# Patient Record
Sex: FEMALE | Race: WHITE | Employment: FULL TIME | URBAN - METROPOLITAN AREA
[De-identification: names, ages, dates, MRNs, and addresses within clinical notes are randomized per-mention and may not be internally consistent; named-entity substitution may affect disease eponyms.]

---

## 2017-01-13 ENCOUNTER — OFFICE VISIT (OUTPATIENT)
Dept: FAMILY MEDICINE | Facility: CLINIC | Age: 42
End: 2017-01-13
Payer: COMMERCIAL

## 2017-01-13 VITALS
SYSTOLIC BLOOD PRESSURE: 130 MMHG | HEIGHT: 60 IN | OXYGEN SATURATION: 98 % | TEMPERATURE: 98.7 F | DIASTOLIC BLOOD PRESSURE: 80 MMHG | HEART RATE: 71 BPM | WEIGHT: 173.5 LBS | BODY MASS INDEX: 34.06 KG/M2

## 2017-01-13 DIAGNOSIS — N63.0 BREAST LUMP IN UPPER OUTER QUADRANT: Primary | ICD-10-CM

## 2017-01-13 DIAGNOSIS — Z23 NEED FOR PROPHYLACTIC VACCINATION AND INOCULATION AGAINST INFLUENZA: ICD-10-CM

## 2017-01-13 DIAGNOSIS — Z72.0 TOBACCO ABUSE: ICD-10-CM

## 2017-01-13 PROCEDURE — 90686 IIV4 VACC NO PRSV 0.5 ML IM: CPT | Performed by: FAMILY MEDICINE

## 2017-01-13 PROCEDURE — 90471 IMMUNIZATION ADMIN: CPT | Performed by: FAMILY MEDICINE

## 2017-01-13 PROCEDURE — 99213 OFFICE O/P EST LOW 20 MIN: CPT | Mod: 25 | Performed by: FAMILY MEDICINE

## 2017-01-13 ASSESSMENT — ANXIETY QUESTIONNAIRES
3. WORRYING TOO MUCH ABOUT DIFFERENT THINGS: SEVERAL DAYS
IF YOU CHECKED OFF ANY PROBLEMS ON THIS QUESTIONNAIRE, HOW DIFFICULT HAVE THESE PROBLEMS MADE IT FOR YOU TO DO YOUR WORK, TAKE CARE OF THINGS AT HOME, OR GET ALONG WITH OTHER PEOPLE: NOT DIFFICULT AT ALL
1. FEELING NERVOUS, ANXIOUS, OR ON EDGE: MORE THAN HALF THE DAYS
2. NOT BEING ABLE TO STOP OR CONTROL WORRYING: SEVERAL DAYS
7. FEELING AFRAID AS IF SOMETHING AWFUL MIGHT HAPPEN: SEVERAL DAYS
GAD7 TOTAL SCORE: 10
5. BEING SO RESTLESS THAT IT IS HARD TO SIT STILL: NEARLY EVERY DAY
6. BECOMING EASILY ANNOYED OR IRRITABLE: SEVERAL DAYS

## 2017-01-13 ASSESSMENT — PATIENT HEALTH QUESTIONNAIRE - PHQ9: 5. POOR APPETITE OR OVEREATING: SEVERAL DAYS

## 2017-01-13 NOTE — PROGRESS NOTES
"  SUBJECTIVE:                                                    Sarahy Meng is a 41 year old female who presents to clinic today for the following health issues:    Notes a right breast lump 5 days ago, hasn't changed in size since.   No pain in this area, no skin changes. No nipple discharge.     No previous history of breast lumps.    Has not had a mammogram at this point.     FH of fibrocystic breasts.     Smokes. Says she's cut back to e-cig and fewer cigs daily.       Problem list and histories reviewed & adjusted, as indicated.  Additional history: none    Problem list, Medication list, Allergies, and Medical/Social/Surgical histories reviewed in [facility] and updated as appropriate.    ROS:  Constitutional, HEENT, cardiovascular, pulmonary, gi and gu systems are negative, except as otherwise noted.    OBJECTIVE:                                                    /80 mmHg  Pulse 71  Temp(Src) 98.7  F (37.1  C) (Oral)  Ht 4' 11.75\" (1.518 m)  Wt 173 lb 8 oz (78.699 kg)  BMI 34.15 kg/m2  SpO2 98%  Breastfeeding? No  Body mass index is 34.15 kg/(m^2).  GENERAL: healthy, alert and no distress  BREAST: 1 cm freely moveable lump in right breast at 9 o'clock, left breast normal, no tenderness or nipple discharge and no palpable axillary masses or adenopathy    Diagnostic Test Results:  none      ASSESSMENT/PLAN:                                                      1. Breast lump in upper outer quadrant - Discussed that this was likely benign but will get imaging to confirm.   - MA Diagnostic Digital Bilateral; Future  - US Breast Right Complete 4 Quadrants; Future    2. Tobacco abuse - Discussed tobacco use is a risk factor for breast cancer. She is contemplative and feels she is on the right road with her e-cig.       Stacy Mansfield MD  [facility]    "

## 2017-01-13 NOTE — PATIENT INSTRUCTIONS
Breast Lump, Uncertain Cause    A lump was found in your breast. Most breast lumps are not cancer. They may be caused by normal changes in the breast tissue due to hormone variations that occur with your menstrual cycle. Some women may form lumps that are painful and tender. Others may form lumps that are painless.  At this time, is not possible to be certain of the cause of your lump without further evaluation. This could include:    Another exam by your healthcare provider or a gynecologist    Imaging tests, such as a mammogram or ultrasound    Biopsy (procedure to remove small tissue samples from the breast lump)  Your healthcare provider will explain any additional testing that is needed. Be sure to get answers to any questions you may have.  Home care  Until a diagnosis is made, you may be advised to do the following:    If you are having breast pain:    Take an over-the-counter pain reliever, if directed to by your provider.    Wear a well-fitted bra or sports bra for extra support. If you have breast pain at night, try wearing the bra during sleep.    Apply a warm compress (towel soaked in warm water) to the breast. You may also use a hot water bottle.    Check your breasts each day. Keep a log of whether the lump seems to be changing in size or tenderness with your period. This can help your healthcare provider make the correct diagnosis.  Follow-up care  Follow up with your healthcare provider as directed. Keep all appointments. Also, prepare for any upcoming tests as directed.  When to seek medical advice  Call your healthcare provider right away if any of these occur:    Fever of 100.4 F (38 C) or higher    Redness or swelling of the breast    Discharge from the nipple    Visible changes in the skin over the nipple or breast    Lump grows larger, feels very hard, or has an irregular shape    New lumps form    4026-6739 [employer]. [address] [place] [address], [place] [address]. All rights  reserved. This information is not intended as a substitute for professional medical care. Always follow your healthcare professional's instructions.

## 2017-01-13 NOTE — PROGRESS NOTES
Injectable Influenza Immunization Documentation    1.  Is the person to be vaccinated sick today?  No    2. Does the person to be vaccinated have an allergy to eggs or to a component of the vaccine?  No    3. Has the person to be vaccinated today ever had a serious reaction to influenza vaccine in the past?  No    4. Has the person to be vaccinated ever had Guillain-Hinsdale syndrome?  No     Form completed by Anjana

## 2017-01-13 NOTE — MR AVS SNAPSHOT
After Visit Summary   1/13/2017    Sarahy Meng    MRN: [id number]           Patient Information     Date Of Birth          1975        Visit Information        Provider Department      1/13/2017 8:15 AM Stacy Mansfield MD [facility]        Today's Diagnoses     Breast lump in upper outer quadrant    -  1     Tobacco abuse           Care Instructions      Breast Lump, Uncertain Cause    A lump was found in your breast. Most breast lumps are not cancer. They may be caused by normal changes in the breast tissue due to hormone variations that occur with your menstrual cycle. Some women may form lumps that are painful and tender. Others may form lumps that are painless.  At this time, is not possible to be certain of the cause of your lump without further evaluation. This could include:    Another exam by your healthcare provider or a gynecologist    Imaging tests, such as a mammogram or ultrasound    Biopsy (procedure to remove small tissue samples from the breast lump)  Your healthcare provider will explain any additional testing that is needed. Be sure to get answers to any questions you may have.  Home care  Until a diagnosis is made, you may be advised to do the following:    If you are having breast pain:    Take an over-the-counter pain reliever, if directed to by your provider.    Wear a well-fitted bra or sports bra for extra support. If you have breast pain at night, try wearing the bra during sleep.    Apply a warm compress (towel soaked in warm water) to the breast. You may also use a hot water bottle.    Check your breasts each day. Keep a log of whether the lump seems to be changing in size or tenderness with your period. This can help your healthcare provider make the correct diagnosis.  Follow-up care  Follow up with your healthcare provider as directed. Keep all appointments. Also, prepare for any upcoming tests as directed.  When to seek medical advice  Call  "your healthcare provider right away if any of these occur:    Fever of 100.4 F (38 C) or higher    Redness or swelling of the breast    Discharge from the nipple    Visible changes in the skin over the nipple or breast    Lump grows larger, feels very hard, or has an irregular shape    New lumps form    3114-6178 [employer]. [address] [place] [address]. All rights reserved. This information is not intended as a substitute for professional medical care. Always follow your healthcare professional's instructions.              Follow-ups after your visit        Future tests that were ordered for you today     Open Future Orders        Priority Expected Expires Ordered    MA Diagnostic Digital Bilateral Routine  1/13/2018 1/13/2017    US Breast Right Complete 4 Quadrants Routine  1/13/2018 1/13/2017            Who to contact     If you have questions or need follow up information about today's clinic visit or your schedule please contact [facility] directly at [phone].  Normal or non-critical lab and imaging results will be communicated to you by [employer], letter or phone within 4 business days after the clinic has received the results. If you do not hear from us within 7 days, please contact the clinic through [employer] or phone. If you have a critical or abnormal lab result, we will notify you by phone as soon as possible.  Submit refill requests through [employer] or call your pharmacy and they will forward the refill request to us. Please allow 3 business days for your refill to be completed.          Additional Information About Your Visit        [email or web address] Information     [employer] lets you send messages to your doctor, view your test results, renew your prescriptions, schedule appointments and more. To sign up, go to [email or web address] . Click on \"Log in\" on the left side of the screen, which will take you to the Welcome page. Then click on \"Sign up Now\" on the right " "side of the page.     You will be asked to enter the access code listed below, as well as some personal information. Please follow the directions to create your username and password.     Your access code is: [email or web address]  Expires: 2017  8:40 AM     Your access code will  in 90 days. If you need help or a new code, please call your [facility] or [phone].        Care EveryWhere ID     This is your Care EveryWhere ID. This could be used by other organizations to access your [place] medical records  [id number]        Your Vitals Were     Pulse Temperature Height BMI (Body Mass Index) Pulse Oximetry Breastfeeding?    71 98.7  F (37.1  C) (Oral) 4' 11.75\" (1.518 m) 34.15 kg/m2 98% No       Blood Pressure from Last 3 Encounters:   17 130/80   16 138/82   16 139/79    Weight from Last 3 Encounters:   17 173 lb 8 oz (78.699 kg)   16 163 lb 14.4 oz (74.345 kg)   16 163 lb (73.936 kg)               Primary Care Provider Office Phone # Fax #    Ramona Ann Aaseby-Aguilera, PA-C [phone]       [facility] [address] [address] [address]        Thank you!     Thank you for choosing [facility]  for your care. Our goal is always to provide you with excellent care. Hearing back from our patients is one way we can continue to improve our services. Please take a few minutes to complete the written survey that you may receive in the mail after your visit with us. Thank you!             Your Updated Medication List - Protect others around you: Learn how to safely use, store and throw away your medicines at www.disposemymeds.org.          This list is accurate as of: 17  8:40 AM.  Always use your most recent med list.                   Brand Name Dispense Instructions for use    venlafaxine 75 MG tablet    EFFEXOR    60 tablet    Take 1 tablet (75 mg) by mouth 2 times daily         "

## 2017-01-13 NOTE — NURSING NOTE
"Chief Complaint   Patient presents with     Breast Mass     right        Initial /80 mmHg  Pulse 71  Temp(Src) 98.7  F (37.1  C) (Oral)  Ht 4' 11.75\" (1.518 m)  Wt 173 lb 8 oz (78.699 kg)  BMI 34.15 kg/m2  SpO2 98%  Breastfeeding? No Estimated body mass index is 34.15 kg/(m^2) as calculated from the following:    Height as of this encounter: 4' 11.75\" (1.518 m).    Weight as of this encounter: 173 lb 8 oz (78.699 kg).  BP completed using cuff size: regular right arm   TOMMY Olsen      "

## 2017-01-14 ASSESSMENT — ANXIETY QUESTIONNAIRES: GAD7 TOTAL SCORE: 10

## 2017-01-14 ASSESSMENT — PATIENT HEALTH QUESTIONNAIRE - PHQ9: SUM OF ALL RESPONSES TO PHQ QUESTIONS 1-9: 6

## 2017-01-16 ENCOUNTER — HOSPITAL ENCOUNTER (OUTPATIENT)
Dept: MAMMOGRAPHY | Facility: CLINIC | Age: 42
Discharge: HOME | End: 2017-01-16
Attending: FAMILY MEDICINE | Admitting: FAMILY MEDICINE
Payer: COMMERCIAL

## 2017-01-16 ENCOUNTER — HOSPITAL ENCOUNTER (OUTPATIENT)
Dept: ULTRASOUND IMAGING | Facility: CLINIC | Age: 42
End: 2017-01-16
Attending: FAMILY MEDICINE
Payer: COMMERCIAL

## 2017-01-16 DIAGNOSIS — N63.0 BREAST LUMP IN UPPER OUTER QUADRANT: ICD-10-CM

## 2017-01-16 PROCEDURE — G0204 DX MAMMO INCL CAD BI: HCPCS

## 2017-01-16 PROCEDURE — 76642 ULTRASOUND BREAST LIMITED: CPT | Mod: RT

## 2017-01-25 ENCOUNTER — OFFICE VISIT (OUTPATIENT)
Dept: FAMILY MEDICINE | Facility: CLINIC | Age: 42
End: 2017-01-25
Payer: COMMERCIAL

## 2017-01-25 VITALS
BODY MASS INDEX: 33.96 KG/M2 | SYSTOLIC BLOOD PRESSURE: 136 MMHG | TEMPERATURE: 99.1 F | HEIGHT: 60 IN | DIASTOLIC BLOOD PRESSURE: 88 MMHG | WEIGHT: 173 LBS | HEART RATE: 100 BPM

## 2017-01-25 DIAGNOSIS — R82.90 NONSPECIFIC FINDING ON EXAMINATION OF URINE: ICD-10-CM

## 2017-01-25 DIAGNOSIS — R30.0 DYSURIA: ICD-10-CM

## 2017-01-25 DIAGNOSIS — N30.00 ACUTE CYSTITIS WITHOUT HEMATURIA: Primary | ICD-10-CM

## 2017-01-25 LAB
ALBUMIN UR-MCNC: 30 MG/DL
APPEARANCE UR: CLEAR
BACTERIA #/AREA URNS HPF: ABNORMAL /HPF
BILIRUB UR QL STRIP: NEGATIVE
COLOR UR AUTO: YELLOW
GLUCOSE UR STRIP-MCNC: NEGATIVE MG/DL
HGB UR QL STRIP: ABNORMAL
KETONES UR STRIP-MCNC: NEGATIVE MG/DL
LEUKOCYTE ESTERASE UR QL STRIP: ABNORMAL
NITRATE UR QL: NEGATIVE
PH UR STRIP: 7 PH (ref 5–7)
RBC #/AREA URNS AUTO: ABNORMAL /HPF (ref 0–2)
SP GR UR STRIP: 1.02 (ref 1–1.03)
URN SPEC COLLECT METH UR: ABNORMAL
UROBILINOGEN UR STRIP-ACNC: 2 EU/DL (ref 0.2–1)
WBC #/AREA URNS AUTO: ABNORMAL /HPF (ref 0–2)

## 2017-01-25 PROCEDURE — 87088 URINE BACTERIA CULTURE: CPT | Performed by: FAMILY MEDICINE

## 2017-01-25 PROCEDURE — 81001 URINALYSIS AUTO W/SCOPE: CPT | Performed by: FAMILY MEDICINE

## 2017-01-25 PROCEDURE — 87086 URINE CULTURE/COLONY COUNT: CPT | Performed by: FAMILY MEDICINE

## 2017-01-25 PROCEDURE — 87186 SC STD MICRODIL/AGAR DIL: CPT | Performed by: FAMILY MEDICINE

## 2017-01-25 PROCEDURE — 99213 OFFICE O/P EST LOW 20 MIN: CPT | Performed by: FAMILY MEDICINE

## 2017-01-25 RX ORDER — NITROFURANTOIN 25; 75 MG/1; MG/1
100 CAPSULE ORAL 2 TIMES DAILY
Qty: 14 CAPSULE | Refills: 0 | Status: SHIPPED | OUTPATIENT
Start: 2017-01-25 | End: 2017-04-10

## 2017-01-25 NOTE — PATIENT INSTRUCTIONS
" Try AZO for symptom relief  * BLADDER INFECTION,Female (Adult)    A bladder infection (\"cystitis\" or \"UTI\") usually causes a constant urge to urinate and a burning when passing urine. Urine may be cloudy, smelly or dark. There may be pain in the lower abdomen. A bladder infection occurs when bacteria from the vaginal area enter the bladder opening (urethra). This can occur from sexual intercourse, wearing tight clothing, dehydration and other factors.  HOME CARE:  1. Drink lots of fluids (at least 6-8 glasses a day, unless you must restrict fluids for other medical reasons). This will force the medicine into your urinary system and flush the bacteria out of your body. Cranberry juice has been shown to help clear out the bacteria.  2. Avoid sexual intercourse until your symptoms are gone.  3. A bladder infection is treated with antibiotics. You may also be given Pyridium (generic = phenazopyridine) to reduce the burning sensation. This medicine will cause your urine to become a bright orange color. The orange urine may stain clothing. You may wear a pad or panty-liner to protect clothing.  PREVENTING FUTURE INFECTIONS:  1. Always wipe from front to back after a bowel movement.  2. Keep the genital area clean and dry.  3. Drink plenty of fluids each day to avoid dehydration.  4. Urinate right after intercourse to flush out the bladder.  5. Wear cotton underwear and cotton-lined panty hose; avoid tight-fitting pants.  6. If you are on birth control pills and are having frequent bladder infections, discuss with your doctor.  FOLLOW UP: Return to this facility or see your doctor if ALL symptoms are not gone after three days of treatment.  GET PROMPT MEDICAL ATTENTION if any of the following occur:    Fever over 101 F (38.3 C)    No improvement by the third day of treatment    Increasing back or abdominal pain    Repeated vomiting; unable to keep medicine down    Weakness, dizziness or fainting    Vaginal " discharge    Pain, redness or swelling in the labia (outer vaginal area)    5077-1475 [employer], [address] [place] [address], [place] [address]. All rights reserved. This information is not intended as a substitute for professional medical care. Always follow your healthcare professional's instructions.

## 2017-01-25 NOTE — MR AVS SNAPSHOT
"              After Visit Summary   1/25/2017    Sarahy Meng    MRN: [id number]           Patient Information     Date Of Birth          1975        Visit Information        Provider Department      1/25/2017 1:30 PM Stacy Mansfield MD [facility]        Today's Diagnoses     Acute cystitis without hematuria    -  1     Dysuria         Nonspecific finding on examination of urine           Care Instructions     Try AZO for symptom relief  * BLADDER INFECTION,Female (Adult)    A bladder infection (\"cystitis\" or \"UTI\") usually causes a constant urge to urinate and a burning when passing urine. Urine may be cloudy, smelly or dark. There may be pain in the lower abdomen. A bladder infection occurs when bacteria from the vaginal area enter the bladder opening (urethra). This can occur from sexual intercourse, wearing tight clothing, dehydration and other factors.  HOME CARE:  1. Drink lots of fluids (at least 6-8 glasses a day, unless you must restrict fluids for other medical reasons). This will force the medicine into your urinary system and flush the bacteria out of your body. Cranberry juice has been shown to help clear out the bacteria.  2. Avoid sexual intercourse until your symptoms are gone.  3. A bladder infection is treated with antibiotics. You may also be given Pyridium (generic = phenazopyridine) to reduce the burning sensation. This medicine will cause your urine to become a bright orange color. The orange urine may stain clothing. You may wear a pad or panty-liner to protect clothing.  PREVENTING FUTURE INFECTIONS:  1. Always wipe from front to back after a bowel movement.  2. Keep the genital area clean and dry.  3. Drink plenty of fluids each day to avoid dehydration.  4. Urinate right after intercourse to flush out the bladder.  5. Wear cotton underwear and cotton-lined panty hose; avoid tight-fitting pants.  6. If you are on birth control pills and are having frequent " "bladder infections, discuss with your doctor.  FOLLOW UP: Return to this facility or see your doctor if ALL symptoms are not gone after three days of treatment.  GET PROMPT MEDICAL ATTENTION if any of the following occur:    Fever over 101 F (38.3 C)    No improvement by the third day of treatment    Increasing back or abdominal pain    Repeated vomiting; unable to keep medicine down    Weakness, dizziness or fainting    Vaginal discharge    Pain, redness or swelling in the labia (outer vaginal area)    7799-5972 [employer], [address] [place] [address], [place] [address]. All rights reserved. This information is not intended as a substitute for professional medical care. Always follow your healthcare professional's instructions.          Follow-ups after your visit        Who to contact     If you have questions or need follow up information about today's clinic visit or your schedule please contact [facility] directly at [phone].  Normal or non-critical lab and imaging results will be communicated to you by MyChart, letter or phone within 4 business days after the clinic has received the results. If you do not hear from us within 7 days, please contact the clinic through MyChart or phone. If you have a critical or abnormal lab result, we will notify you by phone as soon as possible.  Submit refill requests through [employer] or call your pharmacy and they will forward the refill request to us. Please allow 3 business days for your refill to be completed.          Additional Information About Your Visit        [employer] Information     [employer] lets you send messages to your doctor, view your test results, renew your prescriptions, schedule appointments and more. To sign up, go to [email or web address] . Click on \"Log in\" on the left side of the screen, which will take you to the Welcome page. Then click on \"Sign up Now\" on the right side of the page.     You will be asked to enter the access " "code listed below, as well as some personal information. Please follow the directions to create your username and password.     Your access code is: [email or web address]  Expires: 2017  8:40 AM     Your access code will  in 90 days. If you need help or a new code, please call your [facility] or [phone].        Care EveryWhere ID     This is your Care EveryWhere ID. This could be used by other organizations to access your [place] medical records  [id number]        Your Vitals Were     Pulse Temperature Height BMI (Body Mass Index) Breastfeeding?       100 99.1  F (37.3  C) (Oral) 4' 11.75\" (1.518 m) 34.05 kg/m2 No        Blood Pressure from Last 3 Encounters:   17 136/88   17 130/80   16 138/82    Weight from Last 3 Encounters:   17 173 lb (78.472 kg)   17 173 lb 8 oz (78.699 kg)   16 163 lb 14.4 oz (74.345 kg)              We Performed the Following     UA reflex to Microscopic and Culture     Urine Culture Aerobic Bacterial     Urine Microscopic          Today's Medication Changes          These changes are accurate as of: 17  1:56 PM.  If you have any questions, ask your nurse or doctor.               Start taking these medicines.        Dose/Directions    nitrofurantoin (macrocrystal-monohydrate) 100 MG capsule   Commonly known as:  MACROBID   Used for:  Acute cystitis without hematuria   Started by:  Stacy Mansfield MD        Dose:  100 mg   Take 1 capsule (100 mg) by mouth 2 times daily   Quantity:  14 capsule   Refills:  0            Where to get your medicines      These medications were sent to [id number] - [place], [place] - [address]   [address], [facility] [address]     Phone:  [phone]    - nitrofurantoin (macrocrystal-monohydrate) 100 MG capsule             Primary Care Provider Office Phone # Fax #    Ramona Ann Aaseby-Aguilera, PA-C [phone]       [facility] [address]  [place] " [place] [address]        Thank you!     Thank you for choosing [facility]  for your care. Our goal is always to provide you with excellent care. Hearing back from our patients is one way we can continue to improve our services. Please take a few minutes to complete the written survey that you may receive in the mail after your visit with us. Thank you!             Your Updated Medication List - Protect others around you: Learn how to safely use, store and throw away your medicines at www.disposemymeds.org.          This list is accurate as of: 1/25/17  1:56 PM.  Always use your most recent med list.                   Brand Name Dispense Instructions for use    nitrofurantoin (macrocrystal-monohydrate) 100 MG capsule    MACROBID    14 capsule    Take 1 capsule (100 mg) by mouth 2 times daily       venlafaxine 75 MG tablet    EFFEXOR    60 tablet    Take 1 tablet (75 mg) by mouth 2 times daily

## 2017-01-25 NOTE — PROGRESS NOTES
"  SUBJECTIVE:                                                    Sarahy Meng is a 41 year old female who presents to clinic today for the following health issues:    URINARY TRACT SYMPTOMS      Duration: couple weeks. Got worse yesterday    Description  Fatigue, shaky, bloating, burning    Intensity:  moderate, severe    Accompanying signs and symptoms:  Fever/chills: no   Flank pain YES  Nausea and vomiting: YES nausea  Vaginal symptoms: odor  Abdominal/Pelvic Pain: YES    History  History of frequent UTI's: YES  History of kidney stones: no   Sexually Active: YES  Possibility of pregnancy: No    Precipitating or alleviating factors: None    Therapies tried and outcome: increase fluid intake   Outcome: not helping much         Problem list and histories reviewed & adjusted, as indicated.  Additional history: none    Problem list, Medication list, Allergies, and Medical/Social/Surgical histories reviewed in EPIC and updated as appropriate.    ROS:  Constitutional, HEENT, cardiovascular, pulmonary, gi and gu systems are negative, except as otherwise noted.    OBJECTIVE:                                                    /88 mmHg  Pulse 100  Temp(Src) 99.1  F (37.3  C) (Oral)  Ht 4' 11.75\" (1.518 m)  Wt 173 lb (78.472 kg)  BMI 34.05 kg/m2  Breastfeeding? No  Body mass index is 34.05 kg/(m^2).  GENERAL: healthy, alert and no distress  ABDOMEN: soft, nontender, no hepatosplenomegaly, no masses and bowel sounds normal    Diagnostic Test Results:  Results for orders placed or performed in visit on 01/25/17 (from the past 24 hour(s))   UA reflex to Microscopic and Culture   Result Value Ref Range    Color Urine Yellow     Appearance Urine Clear     Glucose Urine Negative NEG mg/dL    Bilirubin Urine Negative NEG    Ketones Urine Negative NEG mg/dL    Specific Gravity Urine 1.020 1.003 - 1.035    Blood Urine Trace (A) NEG    pH Urine 7.0 5.0 - 7.0 pH    Protein Albumin Urine 30 (A) NEG mg/dL    Urobilinogen " Urine 2.0 (H) 0.2 - 1.0 EU/dL    Nitrite Urine Negative NEG    Leukocyte Esterase Urine Trace (A) NEG    Source Midstream Urine    Urine Microscopic   Result Value Ref Range    WBC Urine 10-25  CULTURED.   (A) 0 - 2 /HPF    RBC Urine O - 2 0 - 2 /HPF    Bacteria Urine Many (A) NEG /HPF        ASSESSMENT/PLAN:                                                      1. Acute cystitis without hematuria - Discussed treatment, use AZO for symptom relief.   - nitrofurantoin, macrocrystal-monohydrate, (MACROBID) 100 MG capsule; Take 1 capsule (100 mg) by mouth 2 times daily  Dispense: 14 capsule; Refill: 0    2. Dysuria  - UA reflex to Microscopic and Culture  - Urine Microscopic  - Urine Culture Aerobic Bacterial    3. Nonspecific finding on examination of urine  - Urine Culture Aerobic Bacterial    Stacy Mansfield MD  [facility]

## 2017-01-25 NOTE — NURSING NOTE
"Chief Complaint   Patient presents with     Urinary Problem       Initial /88 mmHg  Pulse 100  Temp(Src) 99.1  F (37.3  C) (Oral)  Ht 4' 11.75\" (1.518 m)  Wt 173 lb (78.472 kg)  BMI 34.05 kg/m2  Breastfeeding? No Estimated body mass index is 34.05 kg/(m^2) as calculated from the following:    Height as of this encounter: 4' 11.75\" (1.518 m).    Weight as of this encounter: 173 lb (78.472 kg).  BP completed using cuff size: brandin Aldana CMA          "

## 2017-01-27 LAB
BACTERIA SPEC CULT: ABNORMAL
MICRO REPORT STATUS: ABNORMAL
MICROORGANISM SPEC CULT: ABNORMAL
SPECIMEN SOURCE: ABNORMAL

## 2017-04-03 DIAGNOSIS — F41.9 ANXIETY: ICD-10-CM

## 2017-04-03 DIAGNOSIS — F33.1 MAJOR DEPRESSIVE DISORDER, RECURRENT EPISODE, MODERATE (H): ICD-10-CM

## 2017-04-03 NOTE — TELEPHONE ENCOUNTER
Pending Prescriptions:                       Disp   Refills    venlafaxine (EFFEXOR) 75 MG tablet [Pharm*60 tab*2            Sig: TAKE ONE TABLET BY MOUTH TWICE A DAY    THIS MEDICATION WAS DISCONTINUED ON 04/03/2017        Last Written Prescription Date: 11/20/2016  Last Fill Quantity: 60, # refills: 3  Last Office Visit with G, P or [facility] prescribing provider: 01/25/2017        BP Readings from Last 3 Encounters:   01/25/17 136/88   01/13/17 130/80   05/05/16 138/82     Pulse: (for Fetzima)  Creatinine   Date Value Ref Range Status   02/06/2015 0.56 0.52 - 1.04 mg/dL Final   ]    Last PHQ-9 score on record=   PHQ-9 SCORE 1/13/2017   Total Score -   Total Score 6     Samuel RAMOS

## 2017-04-05 RX ORDER — VENLAFAXINE 75 MG/1
TABLET ORAL
Qty: 60 TABLET | Refills: 2 | OUTPATIENT
Start: 2017-04-05

## 2017-04-05 NOTE — TELEPHONE ENCOUNTER
Pt is taking medication-  She is due for yearly PX/Med check with PCP-    She states she does have medication left as she does not always remember to take it BID.     Tamy Borrego RN

## 2017-04-10 ENCOUNTER — OFFICE VISIT (OUTPATIENT)
Dept: FAMILY MEDICINE | Facility: CLINIC | Age: 42
End: 2017-04-10
Payer: COMMERCIAL

## 2017-04-10 VITALS
BODY MASS INDEX: 34.36 KG/M2 | DIASTOLIC BLOOD PRESSURE: 80 MMHG | HEART RATE: 66 BPM | OXYGEN SATURATION: 98 % | TEMPERATURE: 98.7 F | RESPIRATION RATE: 16 BRPM | HEIGHT: 60 IN | WEIGHT: 175 LBS | SYSTOLIC BLOOD PRESSURE: 120 MMHG

## 2017-04-10 DIAGNOSIS — Z13.220 LIPID SCREENING: ICD-10-CM

## 2017-04-10 DIAGNOSIS — F41.9 ANXIETY: Primary | ICD-10-CM

## 2017-04-10 DIAGNOSIS — Z13.0 SCREENING FOR DISORDER OF BLOOD AND BLOOD-FORMING ORGANS: ICD-10-CM

## 2017-04-10 DIAGNOSIS — Z13.1 SCREENING FOR DIABETES MELLITUS: ICD-10-CM

## 2017-04-10 DIAGNOSIS — Z13.29 SCREENING FOR THYROID DISORDER: ICD-10-CM

## 2017-04-10 LAB
ALBUMIN SERPL-MCNC: 3.9 G/DL (ref 3.4–5)
ALP SERPL-CCNC: 65 U/L (ref 40–150)
ALT SERPL W P-5'-P-CCNC: 55 U/L (ref 0–50)
ANION GAP SERPL CALCULATED.3IONS-SCNC: 8 MMOL/L (ref 3–14)
AST SERPL W P-5'-P-CCNC: 20 U/L (ref 0–45)
BILIRUB SERPL-MCNC: 0.4 MG/DL (ref 0.2–1.3)
BUN SERPL-MCNC: 10 MG/DL (ref 7–30)
CALCIUM SERPL-MCNC: 8.7 MG/DL (ref 8.5–10.1)
CHLORIDE SERPL-SCNC: 105 MMOL/L (ref 94–109)
CHOLEST SERPL-MCNC: 242 MG/DL
CO2 SERPL-SCNC: 26 MMOL/L (ref 20–32)
CREAT SERPL-MCNC: 0.46 MG/DL (ref 0.52–1.04)
ERYTHROCYTE [DISTWIDTH] IN BLOOD BY AUTOMATED COUNT: 11.9 % (ref 10–15)
GFR SERPL CREATININE-BSD FRML MDRD: ABNORMAL ML/MIN/1.7M2
GLUCOSE SERPL-MCNC: 89 MG/DL (ref 70–99)
HCT VFR BLD AUTO: 40.2 % (ref 35–47)
HDLC SERPL-MCNC: 44 MG/DL
HGB BLD-MCNC: 13.7 G/DL (ref 11.7–15.7)
LDLC SERPL CALC-MCNC: 165 MG/DL
MCH RBC QN AUTO: 32.9 PG (ref 26.5–33)
MCHC RBC AUTO-ENTMCNC: 34.1 G/DL (ref 31.5–36.5)
MCV RBC AUTO: 96 FL (ref 78–100)
NONHDLC SERPL-MCNC: 198 MG/DL
PLATELET # BLD AUTO: 324 10E9/L (ref 150–450)
POTASSIUM SERPL-SCNC: 4.1 MMOL/L (ref 3.4–5.3)
PROT SERPL-MCNC: 7.6 G/DL (ref 6.8–8.8)
RBC # BLD AUTO: 4.17 10E12/L (ref 3.8–5.2)
SODIUM SERPL-SCNC: 139 MMOL/L (ref 133–144)
TRIGL SERPL-MCNC: 167 MG/DL
TSH SERPL DL<=0.005 MIU/L-ACNC: 1.48 MU/L (ref 0.4–4)
WBC # BLD AUTO: 7.2 10E9/L (ref 4–11)

## 2017-04-10 PROCEDURE — 99396 PREV VISIT EST AGE 40-64: CPT | Performed by: PHYSICIAN ASSISTANT

## 2017-04-10 PROCEDURE — 84443 ASSAY THYROID STIM HORMONE: CPT | Performed by: PHYSICIAN ASSISTANT

## 2017-04-10 PROCEDURE — 80061 LIPID PANEL: CPT | Performed by: PHYSICIAN ASSISTANT

## 2017-04-10 PROCEDURE — 36415 COLL VENOUS BLD VENIPUNCTURE: CPT | Performed by: PHYSICIAN ASSISTANT

## 2017-04-10 PROCEDURE — 85027 COMPLETE CBC AUTOMATED: CPT | Performed by: PHYSICIAN ASSISTANT

## 2017-04-10 PROCEDURE — 80053 COMPREHEN METABOLIC PANEL: CPT | Performed by: PHYSICIAN ASSISTANT

## 2017-04-10 RX ORDER — VENLAFAXINE 75 MG/1
TABLET ORAL 2 TIMES DAILY
Refills: 3 | COMMUNITY
Start: 2017-03-08 | End: 2018-02-12

## 2017-04-10 RX ORDER — VENLAFAXINE HYDROCHLORIDE 150 MG/1
150 CAPSULE, EXTENDED RELEASE ORAL DAILY
Qty: 90 CAPSULE | Refills: 1 | Status: SHIPPED | OUTPATIENT
Start: 2017-04-10 | End: 2018-02-12

## 2017-04-10 NOTE — PROGRESS NOTES
SUBJECTIVE:     CC: Sarahy Meng is an 41 year old woman who presents for preventive health visit.     Healthy Habits:    Do you get at least three servings of calcium containing foods daily (dairy, green leafy vegetables, etc.)? yes    Amount of exercise or daily activities, outside of work: none, will be starting yoga     Problems taking medications regularly No    Medication side effects: No    Have you had an eye exam in the past two years? no    Do you see a dentist twice per year? yes    Do you have sleep apnea, excessive snoring or daytime drowsiness?no, but has low energy, always tired        PROBLEMS TO ADD ON...would like have thyroid testing has hx of thyroid disease in family, discuss anxiety medication.    Today's PHQ-2 Score:   PHQ-2 ( 1999 Pfizer) 4/24/2015 2/6/2015   Q1: Little interest or pleasure in doing things 0 1   Q2: Feeling down, depressed or hopeless 0 1   PHQ-2 Score 0 2       Abuse: Current or Past(Physical, Sexual or Emotional)- No  Do you feel safe in your environment - Yes    Social History   Substance Use Topics     Smoking status: Current Every Day Smoker     Years: 15.00     Types: Cigarettes     Smokeless tobacco: Never Used      Comment: 5 ciggs a day     Alcohol use Yes      Comment: socially     The patient does not drink >3 drinks per day nor >7 drinks per week.    Recent Labs   Lab Test  02/06/15   1002  11/13/10   0905   CHOL  212*  208*   HDL  39*  36*   LDL  147*  143*   TRIG  131  142   CHOLHDLRATIO  5.4*  5.7*       Reviewed orders with patient.  Reviewed health maintenance and updated orders accordingly - Yes    Mammo Decision Support:  Mammogram not appropriate for this patient based on age.    Pertinent mammograms are reviewed under the imaging tab.  History of abnormal Pap smear: NO - age 30- 65 PAP every 3 years recommended    Reviewed and updated as needed this visit by clinical staff  Tobacco  Allergies  Med Hx  Surg Hx  Fam Hx  Soc Hx        Reviewed and  "updated as needed this visit by Provider            ROS:  C: NEGATIVE for fever, chills, change in weight  I: NEGATIVE for worrisome rashes, moles or lesions  E: NEGATIVE for vision changes or irritation  ENT: NEGATIVE for ear, mouth and throat problems  R: NEGATIVE for significant cough or SOB  B: NEGATIVE for masses, tenderness or discharge  CV: NEGATIVE for chest pain, palpitations or peripheral edema  GI: NEGATIVE for nausea, abdominal pain, heartburn, or change in bowel habits  : NEGATIVE for unusual urinary or vaginal symptoms. Periods are regular.  M: NEGATIVE for significant arthralgias or myalgia  N: NEGATIVE for weakness, dizziness or paresthesias  P: NEGATIVE for changes in mood or affect    BP Readings from Last 3 Encounters:   04/10/17 120/80   01/25/17 136/88   01/13/17 130/80    Wt Readings from Last 3 Encounters:   04/10/17 175 lb (79.4 kg)   01/25/17 173 lb (78.5 kg)   01/13/17 173 lb 8 oz (78.7 kg)                  OBJECTIVE:     /80 (BP Location: Right arm, Patient Position: Chair, Cuff Size: Adult Large)  Pulse 66  Temp 98.7  F (37.1  C) (Oral)  Resp 16  Ht 4' 11.5\" (1.511 m)  Wt 175 lb (79.4 kg)  SpO2 98%  BMI 34.75 kg/m2  EXAM:  GENERAL: healthy, alert and no distress  EYES: Eyes grossly normal to inspection, PERRL and conjunctivae and sclerae normal  HENT: ear canals and TM's normal, nose and mouth without ulcers or lesions  NECK: no adenopathy, no asymmetry, masses, or scars and thyroid normal to palpation  RESP: lungs clear to auscultation - no rales, rhonchi or wheezes  BREAST: normal without masses, tenderness or nipple discharge and no palpable axillary masses or adenopathy  CV: regular rate and rhythm, normal S1 S2, no S3 or S4, no murmur, click or rub, no peripheral edema and peripheral pulses strong  ABDOMEN: soft, nontender, no hepatosplenomegaly, no masses and bowel sounds normal  MS: no gross musculoskeletal defects noted, no edema  SKIN: no suspicious lesions or " "rashes  NEURO: Normal strength and tone, mentation intact and speech normal  PSYCH: mentation appears normal, affect normal/bright  LYMPH: no cervical, supraclavicular, axillary, or inguinal adenopathy    ASSESSMENT/PLAN:     1. Screening for diabetes mellitus    - Comprehensive metabolic panel    2. Screening for thyroid disorder    - TSH with free T4 reflex    3. Lipid screening    - Lipid panel reflex to direct LDL    4. Screening for disorder of blood and blood-forming organs    - CBC with platelets    5. Anxiety  Has been taking 75 mg of Effexor immediate release just once daily as she was running out of medications and needed an office visit. She states she would forget to take her evening dose of Effexor. We'll change this to an extended release 150 mg tablet and advised to follow-up in one month  - venlafaxine (EFFEXOR-XR) 150 MG 24 hr capsule; Take 1 capsule (150 mg) by mouth daily  Dispense: 90 capsule; Refill: 1    COUNSELING:   Reviewed preventive health counseling, as reflected in patient instructions       Regular exercise       Healthy diet/nutrition       Vision screening       Hearing screening         reports that she has been smoking Cigarettes.  She has smoked for the past 15.00 years. She has never used smokeless tobacco.    Estimated body mass index is 34.75 kg/(m^2) as calculated from the following:    Height as of this encounter: 4' 11.5\" (1.511 m).    Weight as of this encounter: 175 lb (79.4 kg).       Counseling Resources:  ATP IV Guidelines  Pooled Cohorts Equation Calculator  Breast Cancer Risk Calculator  FRAX Risk Assessment  ICSI Preventive Guidelines  Dietary Guidelines for Americans, 2010  USDA's MyPlate  ASA Prophylaxis  Lung CA Screening    Ramona Ann Aaseby-Aguilera, PA-C  [facility]  "

## 2017-04-10 NOTE — LETTER
"                   [facility]          [address] [place] [address]                                                                                                       [phone]      Sarahy Meng  [address]  [facility] [address]    Dear Sarahy,    The results of your recent total cholesterol test were elevated.  Your total cholesterol should be less than 200.    The primary goal of therapy is the LDL or \"bad\" cholesterol.  Your LDL level was elevated.  Your LDL goal based on risk factors (i.e. smoking, family history, high blood pressure, low HDL cholesterol) and age is 160.    Your HDL, or \"good\" cholesterol is abnormal.  Your goal is an HDL level above 40 if you are male and 50 if you are female triglycerides are another cholesterol component that is associated with heart disease.  Normal triglycerides are less than 150.  Your triglyceride level is borderline.    I would recommend: increase daily fiber intake, weight loss, start Omega-3 fatty acids, 1000mg 2-3 times daily, increase physical activity with a goal of 150 minutes moderate exercise weekly, decrease saturated fat intake to less than 7% of total calories and repeat fasting lipids and liver tests in 12 months.    The rest of your labs are within acceptable limits.  Enclosed is a copy of the results.      Thank you for choosing [facility]. We appreciate the opportunity to serve you and look forward to supporting your healthcare needs in the future.    If you have any questions or concerns, please call me or my nurse at [phone].        Sincerely,      Katerina Coronado PA-C/Ines Rubin     Results for orders placed or performed in visit on 04/10/17   CBC with platelets   Result Value Ref Range    WBC 7.2 4.0 - 11.0 10e9/L    RBC Count 4.17 3.8 - 5.2 10e12/L    Hemoglobin 13.7 11.7 - 15.7 g/dL    Hematocrit 40.2 35.0 - 47.0 %    MCV 96 78 - 100 fl    MCH 32.9 26.5 - 33.0 pg    " MCHC 34.1 31.5 - 36.5 g/dL    RDW 11.9 10.0 - 15.0 %    Platelet Count 324 150 - 450 10e9/L   TSH with free T4 reflex   Result Value Ref Range    TSH 1.48 0.40 - 4.00 mU/L   Comprehensive metabolic panel   Result Value Ref Range    Sodium 139 133 - 144 mmol/L    Potassium 4.1 3.4 - 5.3 mmol/L    Chloride 105 94 - 109 mmol/L    Carbon Dioxide 26 20 - 32 mmol/L    Anion Gap 8 3 - 14 mmol/L    Glucose 89 70 - 99 mg/dL    Urea Nitrogen 10 7 - 30 mg/dL    Creatinine 0.46 (L) 0.52 - 1.04 mg/dL    GFR Estimate >90  Non  GFR Calc   >60 mL/min/1.7m2    GFR Estimate If Black >90   GFR Calc   >60 mL/min/1.7m2    Calcium 8.7 8.5 - 10.1 mg/dL    Bilirubin Total 0.4 0.2 - 1.3 mg/dL    Albumin 3.9 3.4 - 5.0 g/dL    Protein Total 7.6 6.8 - 8.8 g/dL    Alkaline Phosphatase 65 40 - 150 U/L    ALT 55 (H) 0 - 50 U/L    AST 20 0 - 45 U/L   Lipid panel reflex to direct LDL   Result Value Ref Range    Cholesterol 242 (H) <200 mg/dL    Triglycerides 167 (H) <150 mg/dL    HDL Cholesterol 44 (L) >49 mg/dL    LDL Cholesterol Calculated 165 (H) <100 mg/dL    Non HDL Cholesterol 198 (H) <130 mg/dL

## 2017-04-10 NOTE — MR AVS SNAPSHOT
After Visit Summary   4/10/2017    Sarahy Meng    MRN: [id number]           Patient Information     Date Of Birth          1975        Visit Information        Provider Department      4/10/2017 8:15 AM Aaseby-Aguilera, Ramona Ann, PA-C [facility]        Today's Diagnoses     Anxiety    -  1    Screening for diabetes mellitus        Screening for thyroid disorder        Lipid screening        Screening for disorder of blood and blood-forming organs          Care Instructions      Preventive Health Recommendations  Female Ages 40 to 49    Yearly exam:     See your health care provider every year in order to  1. Review health changes.   2. Discuss preventive care.    3. Review your medicines if your doctor prescribed any.      Get a Pap test every three years (unless you have an abnormal result and your provider advises testing more often).      If you get Pap tests with HPV test, you only need to test every 5 years, unless you have an abnormal result. You do not need a Pap test if your uterus was removed (hysterectomy) and you have not had cancer.      You should be tested each year for STDs (sexually transmitted diseases), if you're at risk.       Ask your doctor if you should have a mammogram.      Have a colonoscopy (test for colon cancer) if someone in your family has had colon cancer or polyps before age 50.       Have a cholesterol test every 5 years.       Have a diabetes test (fasting glucose) after age 45. If you are at risk for diabetes, you should have this test every 3 years.    Shots: Get a flu shot each year. Get a tetanus shot every 10 years.     Nutrition:     Eat at least 5 servings of fruits and vegetables each day.    Eat whole-grain bread, whole-wheat pasta and brown rice instead of white grains and rice.    Talk to your provider about Calcium and Vitamin D.     Lifestyle    Exercise at least 150 minutes a week (an average of 30 minutes a day, 5 days a week).  "This will help you control your weight and prevent disease.    Limit alcohol to one drink per day.    No smoking.     Wear sunscreen to prevent skin cancer.    See your dentist every six months for an exam and cleaning.        Follow-ups after your visit        Who to contact     If you have questions or need follow up information about today's clinic visit or your schedule please contact [facility] directly at [phone].  Normal or non-critical lab and imaging results will be communicated to you by [facility], letter or phone within 4 business days after the clinic has received the results. If you do not hear from us within 7 days, please contact the clinic through [facility] or phone. If you have a critical or abnormal lab result, we will notify you by phone as soon as possible.  Submit refill requests through [email or web address] or call your pharmacy and they will forward the refill request to us. Please allow 3 business days for your refill to be completed.          Additional Information About Your Visit        [facility] [employer] Information     [email or web address] lets you send messages to your doctor, view your test results, renew your prescriptions, schedule appointments and more. To sign up, go to [email or web address] . Click on \"Log in\" on the left side of the screen, which will take you to the Welcome page. Then click on \"Sign up Now\" on the right side of the page.     You will be asked to enter the access code listed below, as well as some personal information. Please follow the directions to create your username and password.     Your access code is: [email or web address]  Expires: 2017  9:40 AM     Your access code will  in 90 days. If you need help or a new code, please call your [facility] clinic or [phone].        Care EveryWhere ID     This is your Care EveryWhere ID. This could be used by other organizations to access your [facility] medical records  [id number]        Your Vitals Were     Pulse Temperature " "Respirations Height Pulse Oximetry BMI (Body Mass Index)    66 98.7  F (37.1  C) (Oral) 16 4' 11.5\" (1.511 m) 98% 34.75 kg/m2       Blood Pressure from Last 3 Encounters:   04/10/17 120/80   01/25/17 136/88   01/13/17 130/80    Weight from Last 3 Encounters:   04/10/17 175 lb (79.4 kg)   01/25/17 173 lb (78.5 kg)   01/13/17 173 lb 8 oz (78.7 kg)              We Performed the Following     CBC with platelets     Comprehensive metabolic panel     Lipid panel reflex to direct LDL     TSH with free T4 reflex          Today's Medication Changes          These changes are accurate as of: 4/10/17  8:56 AM.  If you have any questions, ask your nurse or doctor.               These medicines have changed or have updated prescriptions.        Dose/Directions    * venlafaxine 75 MG tablet   Commonly known as:  EFFEXOR   This may have changed:  Another medication with the same name was added. Make sure you understand how and when to take each.   Changed by:  Aaseby-Aguilera, Ramona Ann, PA-C        2 times daily   Refills:  3       * venlafaxine 150 MG 24 hr capsule   Commonly known as:  EFFEXOR-XR   This may have changed:  You were already taking a medication with the same name, and this prescription was added. Make sure you understand how and when to take each.   Used for:  Anxiety   Changed by:  Aaseby-Aguilera, Ramona Ann, PA-C        Dose:  150 mg   Take 1 capsule (150 mg) by mouth daily   Quantity:  90 capsule   Refills:  1       * Notice:  This list has 2 medication(s) that are the same as other medications prescribed for you. Read the directions carefully, and ask your doctor or other care provider to review them with you.         Where to get your medicines      These medications were sent to [id number] - [place], [place] - [address]  [address], [place] [address]     Phone:  [phone]     venlafaxine 150 MG 24 hr capsule                Primary Care Provider Office Phone # Fax #    Katerina Vera" Aaseby-Aguilera, PA-C [phone]       [facility] [address]  [facility] [address]        Thank you!     Thank you for choosing [facility]  for your care. Our goal is always to provide you with excellent care. Hearing back from our patients is one way we can continue to improve our services. Please take a few minutes to complete the written survey that you may receive in the mail after your visit with us. Thank you!             Your Updated Medication List - Protect others around you: Learn how to safely use, store and throw away your medicines at www.disposemymeds.org.          This list is accurate as of: 4/10/17  8:56 AM.  Always use your most recent med list.                   Brand Name Dispense Instructions for use    * venlafaxine 75 MG tablet    EFFEXOR     2 times daily       * venlafaxine 150 MG 24 hr capsule    EFFEXOR-XR    90 capsule    Take 1 capsule (150 mg) by mouth daily       * Notice:  This list has 2 medication(s) that are the same as other medications prescribed for you. Read the directions carefully, and ask your doctor or other care provider to review them with you.

## 2017-04-10 NOTE — NURSING NOTE
"Chief Complaint   Patient presents with     Physical     fasting        Initial /80 (BP Location: Right arm, Patient Position: Chair, Cuff Size: Adult Large)  Pulse 66  Temp 98.7  F (37.1  C) (Oral)  Resp 16  Ht 4' 11.5\" (1.511 m)  Wt 175 lb (79.4 kg)  SpO2 98%  BMI 34.75 kg/m2 Estimated body mass index is 34.75 kg/(m^2) as calculated from the following:    Height as of this encounter: 4' 11.5\" (1.511 m).    Weight as of this encounter: 175 lb (79.4 kg).  Medication Reconciliation: complete.Gene ZAMORA MA      "

## 2017-04-17 NOTE — PROGRESS NOTES
"Dear Sarahy,    It was a pleasure to see you at your recent visit.      Patient Active Problem List:     Esophageal reflux     Tobacco abuse     Obesity     CARDIOVASCULAR SCREENING; LDL GOAL LESS THAN 160     Anxiety        The results of your recent total cholesterol test were elevated.  Your total cholesterol should be less than 200.    The primary goal of therapy is the LDL or \"bad\" cholesterol.  Your LDL level was elevated.  Your LDL goal based on risk factors (i.e. smoking, family history, high blood pressure, low HDL cholesterol) and age is 160.    Your HDL, or \"good\" cholesterol is abnormal.  Your goal is an HDL level above 40 if you are male and 50 if you are female    Triglycerides are another cholesterol component that is associated with heart disease.  Normal triglycerides are less than 150.  Your   triglyceride level is borderline.    I would recommend: increase daily fiber intake, weight loss, start Omega-3 fatty acids, 1000mg 2-3 times daily, increase physical activity with a goal of 150 minutes moderate exercise weekly, decrease saturated fat intake to less than 7% of total calories and repeat fasting lipids and liver tests in 12 months.        The rest of your labs are within acceptable limits :     Results for orders placed or performed in visit on 04/10/17  -CBC with platelets       Result                                            Value                         Ref Range                       WBC                                               7.2                           4.0 - 11.0 10e9/L               RBC Count                                         4.17                          3.8 - 5.2 10e12/L               Hemoglobin                                        13.7                          11.7 - 15.7 g/dL                Hematocrit                                        40.2                          35.0 - 47.0 %                   MCV                                               96              "               78 - 100 fl                     MCH                                               32.9                          26.5 - 33.0 pg                  MCHC                                              34.1                          31.5 - 36.5 g/dL                RDW                                               11.9                          10.0 - 15.0 %                   Platelet Count                                    324                           150 - 450 10e9/L           -TSH with free T4 reflex       Result                                            Value                         Ref Range                       TSH                                               1.48                          0.40 - 4.00 mU/L           -Comprehensive metabolic panel       Result                                            Value                         Ref Range                       Sodium                                            139                           133 - 144 mmol/L                Potassium                                         4.1                           3.4 - 5.3 mmol/L                Chloride                                          105                           94 - 109 mmol/L                 Carbon Dioxide                                    26                            20 - 32 mmol/L                  Anion Gap                                         8                             3 - 14 mmol/L                   Glucose                                           89                            70 - 99 mg/dL                   Urea Nitrogen                                     10                            7 - 30 mg/dL                    Creatinine                                        0.46 (L)                      0.52 - 1.04 mg/dL               GFR Estimate                                                                    >60 mL/min/1.7m2            >90   Non  GFR Calc          GFR Estimate  If Black                                                           >60 mL/min/1.7m2            >90    GFR Calc          Calcium                                           8.7                           8.5 - 10.1 mg/dL                Bilirubin Total                                   0.4                           0.2 - 1.3 mg/dL                 Albumin                                           3.9                           3.4 - 5.0 g/dL                  Protein Total                                     7.6                           6.8 - 8.8 g/dL                  Alkaline Phosphatase                              65                            40 - 150 U/L                    ALT                                               55 (H)                        0 - 50 U/L                      AST                                               20                            0 - 45 U/L                 -Lipid panel reflex to direct LDL       Result                                            Value                         Ref Range                       Cholesterol                                       242 (H)                       <200 mg/dL                      Triglycerides                                     167 (H)                       <150 mg/dL                      HDL Cholesterol                                   44 (L)                        >49 mg/dL                       LDL Cholesterol Calculated                        165 (H)                       <100 mg/dL                      Non HDL Cholesterol                               198 (H)                       <130 mg/dL                     Thank you for choosing [facility] [place] [facility]. We appreciate the opportunity to serve   you and look forward to supporting your healthcare needs in the future.    If you have any questions or concerns, please contact us at [phone]      Sincerely,        Ramona Aaseby-Aguilera PA-C          TEST DESCRIPTIONS  "  CBC (Complete Blood Coun  t) includes hemoglobin, hematocrit, white blood cells, etc. This test can be used to detect anemia, infection, and abnormalities in blood cells.   Cholesterol is one of the blood fats (lipids) and is the building block used by the body for cell wall and   hormone production. Increased levels of cholesterol have been proven to directly contribute to heart disease and strokes.   Triglycerides are one of the blood fats (lipids) and are thought to be associated with heart disease. If you have had anything to   eat or drink other than water for 12 hours before the test and your level is high, you should have the test repeated after a 12 hour fast. Abnormally high results may also be associated with diabetes, kidney and liver diseases.   LDL is the low density l  ipoprotein component of the cholesterol. It is the harmful substance that deposits cholesterol on the artery walls contributing to heart attacks and strokes. A high LDL level is associated with higher risk of coronary heart disease.   HDL stands for high   density lipoprotein and refers to the so-called \"good\" cholesterol. HDL picks up excess cholesterol in the bloodstream and carries it back to the liver for disposal. Individuals with higher than average HDL seem to have a lower risk of coronary disease.   Vigorous exercise will help increase the blood levels of HDL.   Risk Factor (Total cholesterol/HDL) is a commonly used ratio for cardiac risk assessment. Less than 5.0 for men and 4.4 for women is ideal.   Sodium is an electrolyte useful in diagnosis of   dehydration, diabetes, hypertension, or other diseases involving electrolyte imbalance. It also preserves the balance between calcium and potassium to maintain normal heart action and equilibrium of the body.   Potassium is also an electrolyte that work  s with sodium to regulate the body's water balance and normalize heart rhythm.   Glucose is a measure of blood sugar and is one " of the tests for diabetes. If you have not been fasting, your level will often be high. A low glucose level may be a cause of   weakness or dizziness. Blood sugar ranks with cholesterol as a causative factor in arteriosclerosis and heart attacks.   BUN & Creatinine are waste products excreted by the kidneys. A high BUN can be related to a high protein diet, heavy exercise, fever   and infections, dehydration, kidney stones, and kidney disease. Creatinine elevation is less dependent on diet or exercise and better represents kidney impairment. Low values are not generally significant.   Calcium is a mineral in the blood controlled b  y the parathyroid glands and kidneys. It is important in the formation of bone, in muscle and nerve function, and in blood clotting. Disease of the parathyroid gland, diseased bones or kidneys, or defective absorption of calcium may cause abnormal levels   from the intestine.   ALT, AST, Alk Phos are liver tests. Enzymes found in the liver as well as skeletal and cardiac muscle. Elevations can often be seen in alcoholism, liver or heart disease. Slightly abnormal values are not considered significant.   T  SH stands for Thyroid Stimulating Hormone. A sensitive test used to determine how the thyroid gland is functioning. The thyroid gland produces hormones that control the body's metabolism. When too few hormones are produced (increased TSH), hypothyroidism   occurs which can cause fatigue, sensitivity to cold, and weight gain - an overall slowing down of bodily functions. When too many thyroid hormones are produces (or decreased TSH), it creates a condition call hyperthyroidism (such as Graves' disease) whi  ch causes rapid heartbeat, weight loss, and dizziness, among other symptoms.   PSA stands for Prostate Specific Antigen. Elevated levels of PSA can increase with trauma, infection, inflammation, or disease processes in the prostate such as BPH (Benigh Pr  ostatic Hypertrophy) or  cancer.   Glycohemoglobin or HgBA1C is a 3-month average of blood sugar.

## 2017-09-25 ENCOUNTER — OFFICE VISIT (OUTPATIENT)
Dept: URGENT CARE | Facility: URGENT CARE | Age: 42
End: 2017-09-25
Payer: COMMERCIAL

## 2017-09-25 ENCOUNTER — TELEPHONE (OUTPATIENT)
Dept: FAMILY MEDICINE | Facility: CLINIC | Age: 42
End: 2017-09-25

## 2017-09-25 VITALS
HEART RATE: 70 BPM | TEMPERATURE: 97.5 F | OXYGEN SATURATION: 98 % | SYSTOLIC BLOOD PRESSURE: 148 MMHG | DIASTOLIC BLOOD PRESSURE: 98 MMHG

## 2017-09-25 DIAGNOSIS — R30.0 DYSURIA: Primary | ICD-10-CM

## 2017-09-25 LAB
ALBUMIN UR-MCNC: 100 MG/DL
APPEARANCE UR: CLEAR
BACTERIA #/AREA URNS HPF: ABNORMAL /HPF
BILIRUB UR QL STRIP: ABNORMAL
COLOR UR AUTO: YELLOW
GLUCOSE UR STRIP-MCNC: NEGATIVE MG/DL
HGB UR QL STRIP: ABNORMAL
KETONES UR STRIP-MCNC: 15 MG/DL
LEUKOCYTE ESTERASE UR QL STRIP: ABNORMAL
NITRATE UR QL: POSITIVE
NON-SQ EPI CELLS #/AREA URNS LPF: ABNORMAL /LPF
PH UR STRIP: 6 PH (ref 5–7)
RBC #/AREA URNS AUTO: >100 /HPF
SOURCE: ABNORMAL
SP GR UR STRIP: 1.02 (ref 1–1.03)
UROBILINOGEN UR STRIP-ACNC: 1 EU/DL (ref 0.2–1)
WBC #/AREA URNS AUTO: ABNORMAL /HPF

## 2017-09-25 PROCEDURE — 81001 URINALYSIS AUTO W/SCOPE: CPT | Performed by: FAMILY MEDICINE

## 2017-09-25 PROCEDURE — 87186 SC STD MICRODIL/AGAR DIL: CPT | Performed by: FAMILY MEDICINE

## 2017-09-25 PROCEDURE — 87086 URINE CULTURE/COLONY COUNT: CPT | Performed by: FAMILY MEDICINE

## 2017-09-25 PROCEDURE — 99213 OFFICE O/P EST LOW 20 MIN: CPT | Performed by: FAMILY MEDICINE

## 2017-09-25 PROCEDURE — 87088 URINE BACTERIA CULTURE: CPT | Performed by: FAMILY MEDICINE

## 2017-09-25 RX ORDER — NITROFURANTOIN 25; 75 MG/1; MG/1
100 CAPSULE ORAL 2 TIMES DAILY
Qty: 14 CAPSULE | Refills: 0 | Status: SHIPPED | OUTPATIENT
Start: 2017-09-25 | End: 2017-10-02

## 2017-09-25 NOTE — PROGRESS NOTES
Chief Complaint   Patient presents with     Urgent Care     Urinary Problem     painful during urination about 1/2 hour ago, frequency, throbbing pain, dark red color tx:none     SUBJECTIVE:   Sarahy Meng is a 41 year old female who  presents today for a possible UTI. Symptoms of dysuria and frequency have been going on for 1/2hours(s).  Hematuria yes .  sudden onsetand moderate.  There is no history of fever, chills, nausea or vomiting.  No history of vaginal discharge. This patient does not  have a history of urinary tract infections. Patient denies flank pain, temperature > 101 degrees F. and Vomiting, significant nausea or diarrhea or vaginal discharge     Past Medical History:   Diagnosis Date     Contact dermatitis and other eczema, due to unspecified cause      Depressive disorder, not elsewhere classified      Esophageal reflux      Current Outpatient Prescriptions   Medication Sig Dispense Refill     venlafaxine (EFFEXOR) 75 MG tablet 2 times daily   3     venlafaxine (EFFEXOR-XR) 150 MG 24 hr capsule Take 1 capsule (150 mg) by mouth daily (Patient not taking: Reported on 9/25/2017) 90 capsule 1     Social History   Substance Use Topics     Smoking status: Current Every Day Smoker     Years: 15.00     Types: Cigarettes     Smokeless tobacco: Never Used      Comment: 5 ciggs a day     Alcohol use Yes      Comment: socially     Results for orders placed or performed in visit on 09/25/17   UA reflex to Microscopic and Culture   Result Value Ref Range    Color Urine Yellow     Appearance Urine Clear     Glucose Urine Negative NEG^Negative mg/dL    Bilirubin Urine Small (A) NEG^Negative    Ketones Urine 15 (A) NEG^Negative mg/dL    Specific Gravity Urine 1.025 1.003 - 1.035    Blood Urine Large (A) NEG^Negative    pH Urine 6.0 5.0 - 7.0 pH    Protein Albumin Urine 100 (A) NEG^Negative mg/dL    Urobilinogen Urine 1.0 0.2 - 1.0 EU/dL    Nitrite Urine Positive (A) NEG^Negative    Leukocyte Esterase Urine Small  (A) NEG^Negative    Source Midstream Urine    Urine Microscopic   Result Value Ref Range    WBC Urine 2-5 (A) OTO2^O - 2 /HPF    RBC Urine >100 (A) OTO2^O - 2 /HPF    Squamous Epithelial /LPF Urine Few FEW^Few /LPF    Bacteria Urine Many (A) NEG^Negative /HPF       ROS:   Review of systems negative except as stated above.    OBJECTIVE:  BP (!) 148/98 (BP Location: Right arm, Patient Position: Chair, Cuff Size: Adult Regular)  Pulse 70  Temp 97.5  F (36.4  C) (Tympanic)  SpO2 98%  GENERAL APPEARANCE: healthy, alert and no distress  RESP: lungs clear to auscultation - no rales, rhonchi or wheezes  CV: regular rates and rhythm, normal S1 S2, no murmur noted  ABDOMEN:  soft, nontender, no HSM or masses and bowel sounds normal  BACK: No CVA tenderness  SKIN: no suspicious lesions or rashes    ASSESSMENT:   Lower, uncomplicated urinary tract infection.    Sarahy was seen today for urgent care and urinary problem.    Diagnoses and all orders for this visit:    Dysuria  -     UA reflex to Microscopic and Culture  -     Urine Microscopic  -     nitroFURantoin, macrocrystal-monohydrate, (MACROBID) 100 MG capsule; Take 1 capsule (100 mg) by mouth 2 times daily for 7 days          PLAN:  As per ordered above  Drink plenty of fluids.  Prevention and treatment of UTI's discussed.Signs and symptoms of pyelonephritis mentioned.  Follow up with primary care physician if not improving

## 2017-09-25 NOTE — MR AVS SNAPSHOT
"              After Visit Summary   2017    Sarahy Meng    MRN: [id number]           Patient Information     Date Of Birth          1975        Visit Information        Provider Department      2017 1:25 PM Nelly Wolf MD [facility]        Today's Diagnoses     Dysuria    -  1       Follow-ups after your visit        Who to contact     If you have questions or need follow up information about today's clinic visit or your schedule please contact [facility] directly at [phone].  Normal or non-critical lab and imaging results will be communicated to you by MyChart, letter or phone within 4 business days after the clinic has received the results. If you do not hear from us within 7 days, please contact the clinic through [employer] or phone. If you have a critical or abnormal lab result, we will notify you by phone as soon as possible.  Submit refill requests through [employer] or call your pharmacy and they will forward the refill request to us. Please allow 3 business days for your refill to be completed.          Additional Information About Your Visit        MyChart Information     [employer] lets you send messages to your doctor, view your test results, renew your prescriptions, schedule appointments and more. To sign up, go to [email or web address] . Click on \"Log in\" on the left side of the screen, which will take you to the Welcome page. Then click on \"Sign up Now\" on the right side of the page.     You will be asked to enter the access code listed below, as well as some personal information. Please follow the directions to create your username and password.     Your access code is: [email or web address]  Expires: 2017  2:19 PM     Your access code will  in 90 days. If you need help or a new code, please call your [facility] clinic or [phone].        Care EveryWhere ID     This is your Care EveryWhere ID. This could be used by other organizations to " access your [facility] medical records  [id number]        Your Vitals Were     Pulse Temperature Pulse Oximetry             70 97.5  F (36.4  C) (Tympanic) 98%          Blood Pressure from Last 3 Encounters:   09/25/17 (!) 148/98   04/10/17 120/80   01/25/17 136/88    Weight from Last 3 Encounters:   04/10/17 175 lb (79.4 kg)   01/25/17 173 lb (78.5 kg)   01/13/17 173 lb 8 oz (78.7 kg)              We Performed the Following     UA reflex to Microscopic and Culture     Urine Microscopic          Today's Medication Changes          These changes are accurate as of: 9/25/17  2:19 PM.  If you have any questions, ask your nurse or doctor.               Start taking these medicines.        Dose/Directions    nitroFURantoin (macrocrystal-monohydrate) 100 MG capsule   Commonly known as:  MACROBID   Used for:  Dysuria   Started by:  Nelly Wolf MD        Dose:  100 mg   Take 1 capsule (100 mg) by mouth 2 times daily for 7 days   Quantity:  14 capsule   Refills:  0            Where to get your medicines      These medications were sent to [id number] - [place], [place] - [address]  [address] [facility] [address]     Phone:  [phone]     nitroFURantoin (macrocrystal-monohydrate) 100 MG capsule                Primary Care Provider Office Phone # Fax #    Ramona Ann Aaseby-Aguilera, PA-C [phone] [address]  [facility] [address]        Equal Access to Services     ADITHYA TERESA : Hadnisreen culpo Soomaali, waaxda luqadaha, qaybta kaalmada adeegyada, kathleen idinoah leonard. So [facility] [phone].    ATENCIÓN: Si habla español, tiene a smith disposición servicios gratuitos de asistencia lingüística. Llame al [phone].    We comply with applicable federal civil rights laws and [place] laws. We do not discriminate on the basis of race, color, national origin, age, disability sex, sexual orientation or gender identity.            Thank you!     Thank you  for choosing [facility]  for your care. Our goal is always to provide you with excellent care. Hearing back from our patients is one way we can continue to improve our services. Please take a few minutes to complete the written survey that you may receive in the mail after your visit with us. Thank you!             Your Updated Medication List - Protect others around you: Learn how to safely use, store and throw away your medicines at www.disposemymeds.org.          This list is accurate as of: 9/25/17  2:19 PM.  Always use your most recent med list.                   Brand Name Dispense Instructions for use Diagnosis    nitroFURantoin (macrocrystal-monohydrate) 100 MG capsule    MACROBID    14 capsule    Take 1 capsule (100 mg) by mouth 2 times daily for 7 days    Dysuria       * venlafaxine 75 MG tablet    EFFEXOR     2 times daily        * venlafaxine 150 MG 24 hr capsule    EFFEXOR-XR    90 capsule    Take 1 capsule (150 mg) by mouth daily    Anxiety       * Notice:  This list has 2 medication(s) that are the same as other medications prescribed for you. Read the directions carefully, and ask your doctor or other care provider to review them with you.

## 2017-09-25 NOTE — NURSING NOTE
"Chief Complaint   Patient presents with     Urgent Care     Urinary Problem     painful during urination about 1/2 hour ago, frequency, throbbing pain, dark red color tx:none       Initial BP (!) 148/98 (BP Location: Right arm, Patient Position: Chair, Cuff Size: Adult Regular)  Pulse 70  Temp 97.5  F (36.4  C) (Tympanic)  SpO2 98% Estimated body mass index is 34.75 kg/(m^2) as calculated from the following:    Height as of 4/10/17: 4' 11.5\" (1.511 m).    Weight as of 4/10/17: 175 lb (79.4 kg).  Medication Reconciliation: unable or not appropriate to perform   Kane Guaman Medical Assistant      "

## 2017-09-25 NOTE — TELEPHONE ENCOUNTER
"Pt calling stating she \"knows I have a UTI\" and wondering if she needs to be seen or if PCP can just treat her.  Advised she would need to be seen and offered appt with acute care provider today.  Pt is in the [place] area and states she will go to the [place] urgent care instead.    Lorri Yuan RN, BSN    "

## 2017-09-27 LAB
BACTERIA SPEC CULT: ABNORMAL
SPECIMEN SOURCE: ABNORMAL

## 2017-10-17 ENCOUNTER — TRANSFERRED RECORDS (OUTPATIENT)
Dept: HEALTH INFORMATION MANAGEMENT | Facility: CLINIC | Age: 42
End: 2017-10-17

## 2017-10-22 DIAGNOSIS — F41.9 ANXIETY: ICD-10-CM

## 2017-10-23 RX ORDER — VENLAFAXINE HYDROCHLORIDE 150 MG/1
CAPSULE, EXTENDED RELEASE ORAL
Qty: 90 CAPSULE | Refills: 0 | OUTPATIENT
Start: 2017-10-23

## 2017-10-23 NOTE — TELEPHONE ENCOUNTER
LMTRC  Pt was changed to the XR in April and was suppose to follow up in 1 month but never did.  Need to update TAM and verify dose with patient  Lorri Yuan RN, BSN

## 2017-10-23 NOTE — TELEPHONE ENCOUNTER
Pt reports she stopped medication in May.   Pt reports the medication was not working.   Will come in to discuss other medication options with PCP.     Padmini Villegas RN -- [facility]

## 2018-01-21 ENCOUNTER — OFFICE VISIT (OUTPATIENT)
Dept: URGENT CARE | Facility: URGENT CARE | Age: 43
End: 2018-01-21
Payer: COMMERCIAL

## 2018-01-21 VITALS
OXYGEN SATURATION: 99 % | TEMPERATURE: 97.5 F | HEART RATE: 60 BPM | DIASTOLIC BLOOD PRESSURE: 80 MMHG | SYSTOLIC BLOOD PRESSURE: 122 MMHG

## 2018-01-21 DIAGNOSIS — R30.0 DYSURIA: ICD-10-CM

## 2018-01-21 DIAGNOSIS — N30.00 ACUTE CYSTITIS WITHOUT HEMATURIA: Primary | ICD-10-CM

## 2018-01-21 LAB
ALBUMIN UR-MCNC: ABNORMAL MG/DL
APPEARANCE UR: CLEAR
BACTERIA #/AREA URNS HPF: ABNORMAL /HPF
BILIRUB UR QL STRIP: NEGATIVE
COLOR UR AUTO: YELLOW
GLUCOSE UR STRIP-MCNC: NEGATIVE MG/DL
HGB UR QL STRIP: NEGATIVE
KETONES UR STRIP-MCNC: NEGATIVE MG/DL
LEUKOCYTE ESTERASE UR QL STRIP: ABNORMAL
NITRATE UR QL: NEGATIVE
NON-SQ EPI CELLS #/AREA URNS LPF: ABNORMAL /LPF
PH UR STRIP: 6.5 PH (ref 5–7)
RBC #/AREA URNS AUTO: ABNORMAL /HPF
SOURCE: ABNORMAL
SP GR UR STRIP: 1.02 (ref 1–1.03)
UROBILINOGEN UR STRIP-ACNC: 0.2 EU/DL (ref 0.2–1)
WBC #/AREA URNS AUTO: ABNORMAL /HPF

## 2018-01-21 PROCEDURE — 81001 URINALYSIS AUTO W/SCOPE: CPT | Performed by: FAMILY MEDICINE

## 2018-01-21 PROCEDURE — 87086 URINE CULTURE/COLONY COUNT: CPT | Performed by: FAMILY MEDICINE

## 2018-01-21 PROCEDURE — 99213 OFFICE O/P EST LOW 20 MIN: CPT | Performed by: PHYSICIAN ASSISTANT

## 2018-01-21 PROCEDURE — 87088 URINE BACTERIA CULTURE: CPT | Performed by: FAMILY MEDICINE

## 2018-01-21 RX ORDER — NITROFURANTOIN 25; 75 MG/1; MG/1
100 CAPSULE ORAL 2 TIMES DAILY
Qty: 14 CAPSULE | Refills: 0 | Status: SHIPPED | OUTPATIENT
Start: 2018-01-21 | End: 2018-01-28

## 2018-01-21 NOTE — MR AVS SNAPSHOT
"              After Visit Summary   1/21/2018    Sarahy Meng    MRN: [id number]           Patient Information     Date Of Birth          1975        Visit Information        Provider Department      1/21/2018 11:50 AM Dakota Novak PA-C [facility]        Today's Diagnoses     Acute cystitis without hematuria    -  1    Dysuria          Care Instructions       * BLADDER INFECTION,Female (Adult)    A bladder infection (\"cystitis\" or \"UTI\") usually causes a constant urge to urinate and a burning when passing urine. Urine may be cloudy, smelly or dark. There may be pain in the lower abdomen. A bladder infection occurs when bacteria from the vaginal area enter the bladder opening (urethra). This can occur from sexual intercourse, wearing tight clothing, dehydration and other factors.  HOME CARE:  1. Drink lots of fluids (at least 6-8 glasses a day, unless you must restrict fluids for other medical reasons). This will force the medicine into your urinary system and flush the bacteria out of your body. Cranberry juice has been shown to help clear out the bacteria.  2. Avoid sexual intercourse until your symptoms are gone.  3. A bladder infection is treated with antibiotics. You may also be given Pyridium (generic = phenazopyridine) to reduce the burning sensation. This medicine will cause your urine to become a bright orange color. The orange urine may stain clothing. You may wear a pad or panty-liner to protect clothing.  PREVENTING FUTURE INFECTIONS:  1. Always wipe from front to back after a bowel movement.  2. Keep the genital area clean and dry.  3. Drink plenty of fluids each day to avoid dehydration.  4. Urinate right after intercourse to flush out the bladder.  5. Wear cotton underwear and cotton-lined panty hose; avoid tight-fitting pants.  6. If you are on birth control pills and are having frequent bladder infections, discuss with your doctor.  FOLLOW UP: Return to " "this facility or see your doctor if ALL symptoms are not gone after three days of treatment.  GET PROMPT MEDICAL ATTENTION if any of the following occur:    Fever over 101 F (38.3 C)    No improvement by the third day of treatment    Increasing back or abdominal pain    Repeated vomiting; unable to keep medicine down    Weakness, dizziness or fainting    Vaginal discharge    Pain, redness or swelling in the labia (outer vaginal area)    1791-2170 [employer]. [address] [place] [address], [place] [address]. All rights reserved. This information is not intended as a substitute for professional medical care. Always follow your healthcare professional's instructions.  This information has been modified by your health care provider with permission from the publisher.            Follow-ups after your visit        Who to contact     If you have questions or need follow up information about today's clinic visit or your schedule please contact [facility] directly at [phone].  Normal or non-critical lab and imaging results will be communicated to you by MyChart, letter or phone within 4 business days after the clinic has received the results. If you do not hear from us within 7 days, please contact the clinic through [employer] or phone. If you have a critical or abnormal lab result, we will notify you by phone as soon as possible.  Submit refill requests through [employer] or call your pharmacy and they will forward the refill request to us. Please allow 3 business days for your refill to be completed.          Additional Information About Your Visit        The [employer] Information     [employer] lets you send messages to your doctor, view your test results, renew your prescriptions, schedule appointments and more. To sign up, go to [email or web address] . Click on \"Log in\" on the left side of the screen, which will take you to the Welcome page. Then click on \"Sign up Now\" on the right side of the page. "     You will be asked to enter the access code listed below, as well as some personal information. Please follow the directions to create your username and password.     Your access code is: [email or web address]  Expires: 2018  3:05 PM     Your access code will  in 90 days. If you need help or a new code, please call your [place] clinic or [phone].        Care EveryWhere ID     This is your Care EveryWhere ID. This could be used by other organizations to access your [place] medical records  [id number]        Your Vitals Were     Pulse Temperature Pulse Oximetry             60 97.5  F (36.4  C) (Tympanic) 99%          Blood Pressure from Last 3 Encounters:   18 122/80   17 (!) 148/98   04/10/17 120/80    Weight from Last 3 Encounters:   04/10/17 175 lb (79.4 kg)   17 173 lb (78.5 kg)   17 173 lb 8 oz (78.7 kg)              We Performed the Following     UA reflex to Microscopic and Culture     Urine Culture Aerobic Bacterial     Urine Microscopic          Today's Medication Changes          These changes are accurate as of: 18  3:05 PM.  If you have any questions, ask your nurse or doctor.               Start taking these medicines.        Dose/Directions    nitroFURantoin (macrocrystal-monohydrate) 100 MG capsule   Commonly known as:  MACROBID   Used for:  Acute cystitis without hematuria   Started by:  Dakota Novak PA-C        Dose:  100 mg   Take 1 capsule (100 mg) by mouth 2 times daily for 7 days   Quantity:  14 capsule   Refills:  0            Where to get your medicines      These medications were sent to [id number] - [place], [place] - [address]  [address], [place] [address]     Phone:  [phone]     nitroFURantoin (macrocrystal-monohydrate) 100 MG capsule                Primary Care Provider Office Phone # Fax #    Ramona Ann Aaseby-Aguilera, PA-C [phone] [address]  [facility] [address]         Equal Access to Services     [facility]: Hadii hardeep marmolejo robbivladimir Robertali, wamyda luqaakash, qasilvia rekhajose danielkathleen lane. So [facility] [phone].    ATENCIÓN: Si habla español, tiene a smith disposición servicios gratuitos de asistencia lingüística. Llame al [phone].    We comply with applicable federal civil rights laws and [place] laws. We do not discriminate on the basis of race, color, national origin, age, disability, sex, sexual orientation, or gender identity.            Thank you!     Thank you for choosing [facility]  for your care. Our goal is always to provide you with excellent care. Hearing back from our patients is one way we can continue to improve our services. Please take a few minutes to complete the written survey that you may receive in the mail after your visit with us. Thank you!             Your Updated Medication List - Protect others around you: Learn how to safely use, store and throw away your medicines at www.disposemymeds.org.          This list is accurate as of: 1/21/18  3:05 PM.  Always use your most recent med list.                   Brand Name Dispense Instructions for use Diagnosis    nitroFURantoin (macrocrystal-monohydrate) 100 MG capsule    MACROBID    14 capsule    Take 1 capsule (100 mg) by mouth 2 times daily for 7 days    Acute cystitis without hematuria       * venlafaxine 75 MG tablet    EFFEXOR     2 times daily        * venlafaxine 150 MG 24 hr capsule    EFFEXOR-XR    90 capsule    Take 1 capsule (150 mg) by mouth daily    Anxiety       * Notice:  This list has 2 medication(s) that are the same as other medications prescribed for you. Read the directions carefully, and ask your doctor or other care provider to review them with you.

## 2018-01-21 NOTE — NURSING NOTE
"Chief Complaint   Patient presents with     Urgent Care     Possible UTI. Frequency, burning, low urine output, odor, cloudy.       Initial /80 (BP Location: Right arm, Patient Position: Sitting, Cuff Size: Adult Regular)  Pulse 60  Temp 97.5  F (36.4  C) (Tympanic)  SpO2 99% Estimated body mass index is 34.75 kg/(m^2) as calculated from the following:    Height as of 4/10/17: 4' 11.5\" (1.511 m).    Weight as of 4/10/17: 175 lb (79.4 kg).  Medication Reconciliation: complete   Xochilt Richards CMA (AAMA)            "

## 2018-01-21 NOTE — PATIENT INSTRUCTIONS
"   * BLADDER INFECTION,Female (Adult)    A bladder infection (\"cystitis\" or \"UTI\") usually causes a constant urge to urinate and a burning when passing urine. Urine may be cloudy, smelly or dark. There may be pain in the lower abdomen. A bladder infection occurs when bacteria from the vaginal area enter the bladder opening (urethra). This can occur from sexual intercourse, wearing tight clothing, dehydration and other factors.  HOME CARE:  1. Drink lots of fluids (at least 6-8 glasses a day, unless you must restrict fluids for other medical reasons). This will force the medicine into your urinary system and flush the bacteria out of your body. Cranberry juice has been shown to help clear out the bacteria.  2. Avoid sexual intercourse until your symptoms are gone.  3. A bladder infection is treated with antibiotics. You may also be given Pyridium (generic = phenazopyridine) to reduce the burning sensation. This medicine will cause your urine to become a bright orange color. The orange urine may stain clothing. You may wear a pad or panty-liner to protect clothing.  PREVENTING FUTURE INFECTIONS:  1. Always wipe from front to back after a bowel movement.  2. Keep the genital area clean and dry.  3. Drink plenty of fluids each day to avoid dehydration.  4. Urinate right after intercourse to flush out the bladder.  5. Wear cotton underwear and cotton-lined panty hose; avoid tight-fitting pants.  6. If you are on birth control pills and are having frequent bladder infections, discuss with your doctor.  FOLLOW UP: Return to this facility or see your doctor if ALL symptoms are not gone after three days of treatment.  GET PROMPT MEDICAL ATTENTION if any of the following occur:    Fever over 101 F (38.3 C)    No improvement by the third day of treatment    Increasing back or abdominal pain    Repeated vomiting; unable to keep medicine down    Weakness, dizziness or fainting    Vaginal discharge    Pain, redness or swelling in " the labia (outer vaginal area)    9296-3963 [employer]. [address] [place] [address], [place] [address]. All rights reserved. This information is not intended as a substitute for professional medical care. Always follow your healthcare professional's instructions.  This information has been modified by your health care provider with permission from the publisher.

## 2018-01-22 LAB
BACTERIA SPEC CULT: ABNORMAL
SPECIMEN SOURCE: ABNORMAL

## 2018-01-23 ENCOUNTER — TELEPHONE (OUTPATIENT)
Dept: URGENT CARE | Facility: URGENT CARE | Age: 43
End: 2018-01-23

## 2018-01-23 NOTE — TELEPHONE ENCOUNTER
Called patient, left message to call back on symptoms. Please advise Dr. Arellano message below.  Kane Guaman, Medical Assistant

## 2018-01-23 NOTE — TELEPHONE ENCOUNTER
Please call patient to find out if her symptoms have improved.  If she's still having urinary symptoms, we should change her antibiotic to penicillin 500 mg BID x 7 days.

## 2018-01-24 NOTE — PROGRESS NOTES
Sarahy Mengpresents to clinic today c/o abrupt onset dysuria, urinary urgency/frequency and suprapubic area pressure at 4 am today. SHe has also noted cloudy urine and odor to urine.      Last UTI approximately 3 months ago.  No hx of resistant infection, kidney stones, or kidney infections.      Red Flag Review: Denies any abdominal pain, gross hematuria or flank pain. Denies any F/C/N/V/D or other acute sxs.      Past Medical History:   Diagnosis Date     Contact dermatitis and other eczema, due to unspecified cause      Depressive disorder, not elsewhere classified      Esophageal reflux        Current Outpatient Prescriptions   Medication     nitroFURantoin, macrocrystal-monohydrate, (MACROBID) 100 MG capsule     venlafaxine (EFFEXOR) 75 MG tablet     venlafaxine (EFFEXOR-XR) 150 MG 24 hr capsule     No current facility-administered medications for this visit.        Allergies   Allergen Reactions     No Known Drug Allergies          ROS:     GI: Denies any abdominal pain. Denies any F/C/N/V/D.   GYN: Denies any pelvic pain. No abnormal vaginal discharge.IUD for contraception. No concerns about pregnancy or STD. Declined HCG screening and STD screening today.   SKIN: Denies rash   NEURO: Denies any high fevers, confusion or mental status changes      OBJECTIVE:  /80 (BP Location: Right arm, Patient Position: Sitting, Cuff Size: Adult Regular)  Pulse 60  Temp 97.5  F (36.4  C) (Tympanic)  SpO2 99%        GENERAL:  Very pleasant, comfortable and generally well appearing.  SKIN: No rashes.  Normal color.  Sclera clear.  CARDIAC:NORMAL - regular rate and rhythm without murmur., normal s1/s2 and without extra heart sounds  RESP: Normal - CTA without rales, rhonchi, or wheezing.  ABDOMEN:  Soft, non-tender, non-distended.  Positive normal bowel sounds.  No HSM or masses.  Positive, mild, suprapubic tenderness.  No CVA tenderness.  NEURO: Alert and oriented.  Normal speech and mentation.  CN II/XII grossly  "intact.  Gait within normal limits.      Component      Latest Ref Rng & Units 1/21/2018   Color Urine       Yellow   Appearance Urine       Clear   Glucose Urine      NEG:Negative mg/dL Negative   Bilirubin Urine      NEG:Negative Negative   Ketones Urine      NEG:Negative mg/dL Negative   Specific Gravity Urine      1.003 - 1.035 1.020   Blood Urine      NEG:Negative Negative   pH Urine      5.0 - 7.0 pH 6.5   Protein Albumin Urine      NEG:Negative mg/dL Trace (A)   Urobilinogen Urine      0.2 - 1.0 EU/dL 0.2   Nitrite Urine      NEG:Negative Negative   Leukocyte Esterase Urine      NEG:Negative Trace (A)   Source       Midstream Urine   WBC Urine      OTO2:O - 2 /HPF 10-25 (A)   RBC Urine      OTO2:O - 2 /HPF O - 2   Squamous Epithelial /LPF Urine      FEW:Few /LPF Few   Bacteria Urine      NEG:Negative /HPF Many (A)       ASSESSMENT/PLAN:    (N30.00) Acute cystitis without hematuria  (primary encounter diagnosis)  Plan: nitroFURantoin, macrocrystal-monohydrate,         (MACROBID) 100 MG capsule          1. Abx as per above.     2. Push plain, non-carbonated water.  Avoid acidic fluids and bladder irritants (reviewed with patient). Take full course of antibiotic as prescribed.     3. Follow-up immediately if any fever, chills, nausea, vomiting, back or flank pain.  Follow-up with PCP if there are any residual urinary symptoms after course of prescribed antibiotics.        4. Follow-up with PCP if sxs change, worsen or fail to fully resolve with above tx.       5. In addition to the above, pyelonephritis and UTI \"red flag\" signs and sxs are reviewed with pt both verbally and by way of printed educational material for home review.  Pt verbalizes understanding of and agrees to the above plan.       (R30.0) Dysuria  Plan: UA reflex to Microscopic and Culture, Urine         Microscopic, Urine Culture Aerobic Bacterial      "

## 2018-01-28 ENCOUNTER — OFFICE VISIT (OUTPATIENT)
Dept: URGENT CARE | Facility: URGENT CARE | Age: 43
End: 2018-01-28
Payer: COMMERCIAL

## 2018-01-28 VITALS
DIASTOLIC BLOOD PRESSURE: 92 MMHG | SYSTOLIC BLOOD PRESSURE: 132 MMHG | HEART RATE: 70 BPM | OXYGEN SATURATION: 96 % | TEMPERATURE: 98.7 F

## 2018-01-28 DIAGNOSIS — J98.8 RESPIRATORY INFECTION: Primary | ICD-10-CM

## 2018-01-28 DIAGNOSIS — R07.89 CHEST TIGHTNESS: ICD-10-CM

## 2018-01-28 PROCEDURE — 99213 OFFICE O/P EST LOW 20 MIN: CPT | Performed by: FAMILY MEDICINE

## 2018-01-28 RX ORDER — AZITHROMYCIN 250 MG/1
TABLET, FILM COATED ORAL
Qty: 6 TABLET | Refills: 0 | Status: SHIPPED | OUTPATIENT
Start: 2018-01-28 | End: 2018-02-12

## 2018-01-28 RX ORDER — PREDNISONE 20 MG/1
40 TABLET ORAL DAILY
Qty: 10 TABLET | Refills: 0 | Status: SHIPPED | OUTPATIENT
Start: 2018-01-28 | End: 2018-02-02

## 2018-01-28 NOTE — PROGRESS NOTES
SUBJECTIVE:   Sarahy Meng is a 42 year old female presenting with a chief complaint of Respiratory/ENT symptoms:  Symptoms started 2 weeks ago and include: runny nose, stuffy nose and cough - non-productive.   Symptoms worsening past few days with chest tightness and feeling short of breath at times/with cough.  Occasional phlegm production with cough, no blood.   No h/o asthma.  She is a smoker.     ROS:  5-Point Review of Systems Negative-- Except as stated above.    OBJECTIVE  BP (!) 132/92 (BP Location: Right arm, Patient Position: Chair, Cuff Size: Adult Regular)  Pulse 70  Temp 98.7  F (37.1  C) (Oral)  SpO2 96%  GENERAL:  Awake, alert and interactive. No acute distress.  HEENT:   NC/AT, EOMI, clear conjunctiva.  Clear nasal discharge.  Oropharynx moist and clear.  TM's and EAC's benign.  NECK: supple and free of adenopathy  CHEST:  Lungs are clear, no rhonchi, wheezing or rales. Normal symmetric air entry throughout both lung fields.   HEART:  S1 and S2 normal, no murmurs, clicks, gallops or rubs. Regular rate and rhythm.    ASSESSMENT/PLAN    ICD-10-CM    1. Respiratory infection J98.8 azithromycin (ZITHROMAX) 250 MG tablet     predniSONE (DELTASONE) 20 MG tablet   2. Chest tightness R07.89 predniSONE (DELTASONE) 20 MG tablet      We discussed the expected course and symptomatic cares in detail, including return to care if symptoms not improving as expected, do not resolve completely, or if any new or worsening symptoms develop.

## 2018-01-28 NOTE — NURSING NOTE
"Chief Complaint   Patient presents with     Urgency     URI     Cough x2 weeks, runny nose- SOB, Cx tightness       Initial BP (!) 132/92 (BP Location: Right arm, Patient Position: Chair, Cuff Size: Adult Regular)  Pulse 70  Temp 98.7  F (37.1  C) (Oral)  SpO2 96% Estimated body mass index is 34.75 kg/(m^2) as calculated from the following:    Height as of 4/10/17: 4' 11.5\" (1.511 m).    Weight as of 4/10/17: 175 lb (79.4 kg).  Medication Reconciliation: complete     Sherita Swift CMA (AAMA)        "

## 2018-01-28 NOTE — MR AVS SNAPSHOT
"              After Visit Summary   2018    Sarahy Meng    MRN: [id number]           Patient Information     Date Of Birth          1975        Visit Information        Provider Department      2018 9:40 AM Peg Ramírez DO [facility]        Today's Diagnoses     Respiratory infection    -  1    Chest tightness           Follow-ups after your visit        Who to contact     If you have questions or need follow up information about today's clinic visit or your schedule please contact [facility] directly at [phone].  Normal or non-critical lab and imaging results will be communicated to you by [email or web address], letter or phone within 4 business days after the clinic has received the results. If you do not hear from us within 7 days, please contact the clinic through [email or web address] or phone. If you have a critical or abnormal lab result, we will notify you by phone as soon as possible.  Submit refill requests through [employer] or call your pharmacy and they will forward the refill request to us. Please allow 3 business days for your refill to be completed.          Additional Information About Your Visit        MyChart Information     [employer] lets you send messages to your doctor, view your test results, renew your prescriptions, schedule appointments and more. To sign up, go to [email or web address] . Click on \"Log in\" on the left side of the screen, which will take you to the Welcome page. Then click on \"Sign up Now\" on the right side of the page.     You will be asked to enter the access code listed below, as well as some personal information. Please follow the directions to create your username and password.     Your access code is: [email or web address]  Expires: 2018  3:05 PM     Your access code will  in 90 days. If you need help or a new code, please call your [place] clinic or [phone].        Care EveryWhere ID     This is your Care EveryWhere ID. " This could be used by other organizations to access your [place] medical records  [id number]        Your Vitals Were     Pulse Temperature Pulse Oximetry             70 98.7  F (37.1  C) (Oral) 96%          Blood Pressure from Last 3 Encounters:   01/28/18 (!) 132/92   01/21/18 122/80   09/25/17 (!) 148/98    Weight from Last 3 Encounters:   04/10/17 175 lb (79.4 kg)   01/25/17 173 lb (78.5 kg)   01/13/17 173 lb 8 oz (78.7 kg)              Today, you had the following     No orders found for display         Today's Medication Changes          These changes are accurate as of 1/28/18 10:05 AM.  If you have any questions, ask your nurse or doctor.               Start taking these medicines.        Dose/Directions    azithromycin 250 MG tablet   Commonly known as:  ZITHROMAX   Used for:  Respiratory infection   Started by:  Peg Ramírez,         Two tablets first day, then one tablet daily for four days.   Quantity:  6 tablet   Refills:  0       predniSONE 20 MG tablet   Commonly known as:  DELTASONE   Used for:  Respiratory infection, Chest tightness   Started by:  Peg Ramírez DO        Dose:  40 mg   Take 2 tablets (40 mg) by mouth daily for 5 days   Quantity:  10 tablet   Refills:  0            Where to get your medicines      These medications were sent to [id number] [id number]  [address], [facility] [address]     Phone:  [phone]     azithromycin 250 MG tablet    predniSONE 20 MG tablet                Primary Care Provider Office Phone # Fax #    Ramona Ann Aaseby-Aguilera, PA-C [phone] [address]  [facility] [address]        Equal Access to Services     [facility]: Hadii hardeep gilliam Sojudy, waaxda luqadaha, qaybta kaalmada adeegyada, kathleen leonard. So [facility] [phone].    ATENCIÓN: Si habla español, tiene a smith disposición servicios gratuitos de asistencia lingüística. Llame al  [phone].    We comply with applicable federal civil rights laws and [place] laws. We do not discriminate on the basis of race, color, national origin, age, disability, sex, sexual orientation, or gender identity.            Thank you!     Thank you for choosing [facility]  for your care. Our goal is always to provide you with excellent care. Hearing back from our patients is one way we can continue to improve our services. Please take a few minutes to complete the written survey that you may receive in the mail after your visit with us. Thank you!             Your Updated Medication List - Protect others around you: Learn how to safely use, store and throw away your medicines at www.disposemymeds.org.          This list is accurate as of 1/28/18 10:05 AM.  Always use your most recent med list.                   Brand Name Dispense Instructions for use Diagnosis    azithromycin 250 MG tablet    ZITHROMAX    6 tablet    Two tablets first day, then one tablet daily for four days.    Respiratory infection       nitroFURantoin (macrocrystal-monohydrate) 100 MG capsule    MACROBID    14 capsule    Take 1 capsule (100 mg) by mouth 2 times daily for 7 days    Acute cystitis without hematuria       predniSONE 20 MG tablet    DELTASONE    10 tablet    Take 2 tablets (40 mg) by mouth daily for 5 days    Respiratory infection, Chest tightness       * venlafaxine 75 MG tablet    EFFEXOR     2 times daily        * venlafaxine 150 MG 24 hr capsule    EFFEXOR-XR    90 capsule    Take 1 capsule (150 mg) by mouth daily    Anxiety       * Notice:  This list has 2 medication(s) that are the same as other medications prescribed for you. Read the directions carefully, and ask your doctor or other care provider to review them with you.

## 2018-02-12 ENCOUNTER — OFFICE VISIT (OUTPATIENT)
Dept: FAMILY MEDICINE | Facility: CLINIC | Age: 43
End: 2018-02-12
Payer: COMMERCIAL

## 2018-02-12 VITALS
OXYGEN SATURATION: 97 % | TEMPERATURE: 98.8 F | BODY MASS INDEX: 33.77 KG/M2 | DIASTOLIC BLOOD PRESSURE: 86 MMHG | WEIGHT: 172 LBS | SYSTOLIC BLOOD PRESSURE: 134 MMHG | HEIGHT: 60 IN | HEART RATE: 70 BPM

## 2018-02-12 DIAGNOSIS — N39.0 URINARY TRACT INFECTION WITHOUT HEMATURIA, SITE UNSPECIFIED: ICD-10-CM

## 2018-02-12 DIAGNOSIS — R30.0 DYSURIA: Primary | ICD-10-CM

## 2018-02-12 LAB
ALBUMIN UR-MCNC: NEGATIVE MG/DL
APPEARANCE UR: ABNORMAL
BACTERIA #/AREA URNS HPF: ABNORMAL /HPF
BILIRUB UR QL STRIP: NEGATIVE
COLOR UR AUTO: YELLOW
GLUCOSE UR STRIP-MCNC: NEGATIVE MG/DL
HGB UR QL STRIP: ABNORMAL
KETONES UR STRIP-MCNC: NEGATIVE MG/DL
LEUKOCYTE ESTERASE UR QL STRIP: ABNORMAL
NITRATE UR QL: NEGATIVE
NON-SQ EPI CELLS #/AREA URNS LPF: ABNORMAL /LPF
PH UR STRIP: 7 PH (ref 5–7)
RBC #/AREA URNS AUTO: ABNORMAL /HPF
SOURCE: ABNORMAL
SP GR UR STRIP: 1.01 (ref 1–1.03)
UROBILINOGEN UR STRIP-ACNC: 0.2 EU/DL (ref 0.2–1)
WBC #/AREA URNS AUTO: ABNORMAL /HPF

## 2018-02-12 PROCEDURE — 81001 URINALYSIS AUTO W/SCOPE: CPT | Performed by: PHYSICIAN ASSISTANT

## 2018-02-12 PROCEDURE — 99213 OFFICE O/P EST LOW 20 MIN: CPT | Performed by: PHYSICIAN ASSISTANT

## 2018-02-12 RX ORDER — SULFAMETHOXAZOLE/TRIMETHOPRIM 800-160 MG
1 TABLET ORAL 2 TIMES DAILY
Qty: 6 TABLET | Refills: 0 | Status: SHIPPED | OUTPATIENT
Start: 2018-02-12 | End: 2018-02-15

## 2018-02-12 NOTE — PATIENT INSTRUCTIONS
(R30.0) Dysuria  (primary encounter diagnosis)  Comment:   Plan: *UA reflex to Microscopic and Culture ([place]         and [facility] (except [facility] and         [place]), Urine Microscopic            (N39.0) Urinary tract infection without hematuria, site unspecified  Comment:   Plan: sulfamethoxazole-trimethoprim (BACTRIM         DS/SEPTRA DS) 800-160 MG per tablet, UROLOGY         ADULT REFERRAL            Discussed  prevention of URINARY TRACT INFECTION and the importance of avoiding chemical irritants, clothing and hygiene products that precipitate urinary infection.  The use of cranberry tablets rather than cranberry juice (with excess sugar) to acidify urine is recommended to decrease bacterial growth.   she is discouraged from wearing clothing or hygiene products that hold moisture next to skin:    PREVENTIVE CARE   -Bathe with mild soap  -Wear all-cotton underwear   -Change underwear and pantyhose every day.   -Avoid wearing pantyhose or tights for too many hours, especially in hot,    humid weather.   -Use deodorant-free white toilet paper to avoid perfume and dye that might    irritate.   -Avoid using feminine hygiene products (such as sprays and powders)    and bath additives (such as bubble baths and oils).

## 2018-02-12 NOTE — MR AVS SNAPSHOT
After Visit Summary   2/12/2018    Sarahy Meng    MRN: [id number]           Patient Information     Date Of Birth          1975        Visit Information        Provider Department      2/12/2018 3:45 PM Aaseby-Aguilera, Ramona Ann, PA-C [facility]        Today's Diagnoses     Dysuria    -  1    Urinary tract infection without hematuria, site unspecified          Care Instructions    (R30.0) Dysuria  (primary encounter diagnosis)  Comment:   Plan: *UA reflex to Microscopic and Culture ([place]         and [facility] (except [facility] and         [place]), Urine Microscopic            (N39.0) Urinary tract infection without hematuria, site unspecified  Comment:   Plan: sulfamethoxazole-trimethoprim (BACTRIM         DS/SEPTRA DS) 800-160 MG per tablet, UROLOGY         ADULT REFERRAL            Discussed  prevention of URINARY TRACT INFECTION and the importance of avoiding chemical irritants, clothing and hygiene products that precipitate urinary infection.  The use of cranberry tablets rather than cranberry juice (with excess sugar) to acidify urine is recommended to decrease bacterial growth.   she is discouraged from wearing clothing or hygiene products that hold moisture next to skin:    PREVENTIVE CARE   -Bathe with mild soap  -Wear all-cotton underwear   -Change underwear and pantyhose every day.   -Avoid wearing pantyhose or tights for too many hours, especially in hot,    humid weather.   -Use deodorant-free white toilet paper to avoid perfume and dye that might    irritate.   -Avoid using feminine hygiene products (such as sprays and powders)    and bath additives (such as bubble baths and oils).                Follow-ups after your visit        Additional Services     UROLOGY ADULT REFERRAL       Your provider has referred you to: [facility]: [facility] [place] [phone]   [email or web address]    Please be aware that  "coverage of these services is subject to the terms and limitations of your health insurance plan.  Call member services at your health plan with any benefit or coverage questions.      Please bring the following with you to your appointment:    (1) Any X-Rays, CTs or MRIs which have been performed.  Contact the facility where they were done to arrange for  prior to your scheduled appointment.    (2) List of current medications  (3) This referral request   (4) Any documents/labs given to you for this referral                  Who to contact     If you have questions or need follow up information about today's clinic visit or your schedule please contact [facility] directly at [phone].  Normal or non-critical lab and imaging results will be communicated to you by MyChart, letter or phone within 4 business days after the clinic has received the results. If you do not hear from us within 7 days, please contact the clinic through MyChart or phone. If you have a critical or abnormal lab result, we will notify you by phone as soon as possible.  Submit refill requests through [facility] or call your pharmacy and they will forward the refill request to us. Please allow 3 business days for your refill to be completed.          Additional Information About Your Visit        MyChart Information     [facility] lets you send messages to your doctor, view your test results, renew your prescriptions, schedule appointments and more. To sign up, go to [email or web address] . Click on \"Log in\" on the left side of the screen, which will take you to the Welcome page. Then click on \"Sign up Now\" on the right side of the page.     You will be asked to enter the access code listed below, as well as some personal information. Please follow the directions to create your username and password.     Your access code is: [email or web address]  Expires: 2018  3:05 PM     Your access code will  in 90 days. If you need " "help or a new code, please call your [place] clinic or [phone].        Care EveryWhere ID     This is your Care EveryWhere ID. This could be used by other organizations to access your [place] medical records  [id number]        Your Vitals Were     Pulse Temperature Height Pulse Oximetry Breastfeeding? BMI (Body Mass Index)    70 98.8  F (37.1  C) (Oral) 4' 11.5\" (1.511 m) 97% No 34.16 kg/m2       Blood Pressure from Last 3 Encounters:   02/12/18 134/86   01/28/18 (!) 132/92   01/21/18 122/80    Weight from Last 3 Encounters:   02/12/18 172 lb (78 kg)   04/10/17 175 lb (79.4 kg)   01/25/17 173 lb (78.5 kg)              We Performed the Following     *UA reflex to Microscopic and Culture ([place] and [facility] (except [place] and [place])     Urine Microscopic     UROLOGY ADULT REFERRAL          Today's Medication Changes          These changes are accurate as of 2/12/18  4:21 PM.  If you have any questions, ask your nurse or doctor.               Start taking these medicines.        Dose/Directions    sulfamethoxazole-trimethoprim 800-160 MG per tablet   Commonly known as:  BACTRIM DS/SEPTRA DS   Used for:  Urinary tract infection without hematuria, site unspecified   Started by:  Aaseby-Aguilera, Ramona Ann, PA-C        Dose:  1 tablet   Take 1 tablet by mouth 2 times daily for 3 days   Quantity:  6 tablet   Refills:  0            Where to get your medicines      These medications were sent to [id number] [address]  [address], [facility] [address]     Phone:  [phone]     sulfamethoxazole-trimethoprim 800-160 MG per tablet                Primary Care Provider Office Phone # Fax #    Ramona Ann Aaseby-Aguilera, PA-C [phone] [address]  [facility] [address]        Equal Access to Services     [facility] BRII AH: Hadii aad ku hadasho Soomaali, waaxda luqadaha, qaybta kaalmada delilah, kathleen leonard. So " [facility] [phone].    ATENCIÓN: Si blakela ani, tiene a smith disposición servicios gratuitos de asistencia lingüística. Fátima diane [phone].    We comply with applicable federal civil rights laws and [place] laws. We do not discriminate on the basis of race, color, national origin, age, disability, sex, sexual orientation, or gender identity.            Thank you!     Thank you for choosing [facility]  for your care. Our goal is always to provide you with excellent care. Hearing back from our patients is one way we can continue to improve our services. Please take a few minutes to complete the written survey that you may receive in the mail after your visit with us. Thank you!             Your Updated Medication List - Protect others around you: Learn how to safely use, store and throw away your medicines at www.disposemymeds.org.          This list is accurate as of 2/12/18  4:21 PM.  Always use your most recent med list.                   Brand Name Dispense Instructions for use Diagnosis    sulfamethoxazole-trimethoprim 800-160 MG per tablet    BACTRIM DS/SEPTRA DS    6 tablet    Take 1 tablet by mouth 2 times daily for 3 days    Urinary tract infection without hematuria, site unspecified

## 2018-02-12 NOTE — PROGRESS NOTES
"  SUBJECTIVE:   Sarahy Meng is a 42 year old female who presents to clinic today for the following health issues:      URINARY TRACT SYMPTOMS      Duration: x 1 day    Description  dysuria, frequency and urgency    Intensity:  moderate    Accompanying signs and symptoms:  Fever/chills: no   Flank pain no   Nausea and vomiting: YES- nuasea  Vaginal symptoms: discharge, odor and itching  Abdominal/Pelvic Pain: no     History  History of frequent UTI's: YES  History of kidney stones: no   Sexually Active: YES  Possibility of pregnancy: No    Precipitating or alleviating factors: None    Therapies tried and outcome: none             Problem list and histories reviewed & adjusted, as indicated.  Additional history: as documented    No current outpatient prescriptions on file.     BP Readings from Last 3 Encounters:   02/12/18 134/86   01/28/18 (!) 132/92   01/21/18 122/80    Wt Readings from Last 3 Encounters:   02/12/18 172 lb (78 kg)   04/10/17 175 lb (79.4 kg)   01/25/17 173 lb (78.5 kg)                    Reviewed and updated as needed this visit by clinical staff       Reviewed and updated as needed this visit by Provider         ROS:  Constitutional, HEENT, cardiovascular, pulmonary, gi and gu systems are negative, except as otherwise noted.    OBJECTIVE:                                                    /86 (BP Location: Right arm, Patient Position: Chair, Cuff Size: Adult Regular)  Pulse 70  Temp 98.8  F (37.1  C) (Oral)  Ht 4' 11.5\" (1.511 m)  Wt 172 lb (78 kg)  SpO2 97%  Breastfeeding? No  BMI 34.16 kg/m2  Body mass index is 34.16 kg/(m^2).  GENERAL APPEARANCE: healthy, alert and no distress  RESP: lungs clear to auscultation - no rales, rhonchi or wheezes  CV: regular rates and rhythm, normal S1 S2, no S3 or S4 and no murmur, click or rub  ABDOMEN: soft, nontender, without hepatosplenomegaly or masses and bowel sounds normal    Diagnostic test results:  Diagnostic Test Results:  none  "     ASSESSMENT/PLAN:                                                    1. Dysuria    - *UA reflex to Microscopic and Culture ([place] and [facility] (except [place] and [place])  - Urine Microscopic    2. Urinary tract infection without hematuria, site unspecified    - sulfamethoxazole-trimethoprim (BACTRIM DS/SEPTRA DS) 800-160 MG per tablet; Take 1 tablet by mouth 2 times daily for 3 days  Dispense: 6 tablet; Refill: 0  - UROLOGY ADULT REFERRAL      Patient Instructions   (R30.0) Dysuria  (primary encounter diagnosis)  Comment:   Plan: *UA reflex to Microscopic and Culture ([place]         and [facility] (except [place] and         [place]), Urine Microscopic            (N39.0) Urinary tract infection without hematuria, site unspecified  Comment:   Plan: sulfamethoxazole-trimethoprim (BACTRIM         DS/SEPTRA DS) 800-160 MG per tablet, UROLOGY         ADULT REFERRAL            Discussed  prevention of URINARY TRACT INFECTION and the importance of avoiding chemical irritants, clothing and hygiene products that precipitate urinary infection.  The use of cranberry tablets rather than cranberry juice (with excess sugar) to acidify urine is recommended to decrease bacterial growth.   she is discouraged from wearing clothing or hygiene products that hold moisture next to skin:    PREVENTIVE CARE   -Bathe with mild soap  -Wear all-cotton underwear   -Change underwear and pantyhose every day.   -Avoid wearing pantyhose or tights for too many hours, especially in hot,    humid weather.   -Use deodorant-free white toilet paper to avoid perfume and dye that might    irritate.   -Avoid using feminine hygiene products (such as sprays and powders)    and bath additives (such as bubble baths and oils).            Ramona Ann Aaseby-Aguilera, PA-C  [facility]

## 2018-02-12 NOTE — NURSING NOTE
"Chief Complaint   Patient presents with     UTI       Initial /86 (BP Location: Right arm, Patient Position: Chair, Cuff Size: Adult Regular)  Pulse 70  Temp 98.8  F (37.1  C) (Oral)  Ht 4' 11.5\" (1.511 m)  Wt 172 lb (78 kg)  SpO2 97%  Breastfeeding? No  BMI 34.16 kg/m2 Estimated body mass index is 34.16 kg/(m^2) as calculated from the following:    Height as of this encounter: 4' 11.5\" (1.511 m).    Weight as of this encounter: 172 lb (78 kg).  Medication Reconciliation: complete     Neftali Deshpande CMA      "

## 2018-03-07 ENCOUNTER — OFFICE VISIT (OUTPATIENT)
Dept: UROLOGY | Facility: CLINIC | Age: 43
End: 2018-03-07
Payer: COMMERCIAL

## 2018-03-07 VITALS — WEIGHT: 172 LBS | HEIGHT: 59 IN | BODY MASS INDEX: 34.68 KG/M2 | HEART RATE: 74 BPM | OXYGEN SATURATION: 97 %

## 2018-03-07 DIAGNOSIS — Z87.440 PERSONAL HISTORY OF URINARY TRACT INFECTION: Primary | ICD-10-CM

## 2018-03-07 LAB
ALBUMIN UR-MCNC: 30 MG/DL
APPEARANCE UR: ABNORMAL
BILIRUB UR QL STRIP: NEGATIVE
COLOR UR AUTO: YELLOW
GLUCOSE UR STRIP-MCNC: NEGATIVE MG/DL
HGB UR QL STRIP: ABNORMAL
KETONES UR STRIP-MCNC: NEGATIVE MG/DL
LEUKOCYTE ESTERASE UR QL STRIP: ABNORMAL
NITRATE UR QL: NEGATIVE
PH UR STRIP: 8.5 PH (ref 5–7)
RBC #/AREA URNS AUTO: 7 /HPF (ref 0–2)
RESIDUAL VOLUME (RV) (EXTERNAL): 0
SOURCE: ABNORMAL
SP GR UR STRIP: 1.01 (ref 1–1.03)
UROBILINOGEN UR STRIP-ACNC: 1 EU/DL (ref 0.2–1)
WBC #/AREA URNS AUTO: 28 /HPF (ref 0–5)

## 2018-03-07 PROCEDURE — 87186 SC STD MICRODIL/AGAR DIL: CPT | Performed by: PHYSICIAN ASSISTANT

## 2018-03-07 PROCEDURE — 99203 OFFICE O/P NEW LOW 30 MIN: CPT | Performed by: PHYSICIAN ASSISTANT

## 2018-03-07 PROCEDURE — 87109 MYCOPLASMA: CPT | Performed by: PHYSICIAN ASSISTANT

## 2018-03-07 PROCEDURE — 87086 URINE CULTURE/COLONY COUNT: CPT | Performed by: PHYSICIAN ASSISTANT

## 2018-03-07 PROCEDURE — 87088 URINE BACTERIA CULTURE: CPT | Performed by: PHYSICIAN ASSISTANT

## 2018-03-07 PROCEDURE — 81001 URINALYSIS AUTO W/SCOPE: CPT | Performed by: PHYSICIAN ASSISTANT

## 2018-03-07 RX ORDER — SULFAMETHOXAZOLE/TRIMETHOPRIM 800-160 MG
1 TABLET ORAL DAILY PRN
Qty: 60 TABLET | Refills: 0 | Status: SHIPPED | OUTPATIENT
Start: 2018-03-07 | End: 2018-09-13

## 2018-03-07 ASSESSMENT — PAIN SCALES - GENERAL: PAINLEVEL: NO PAIN (0)

## 2018-03-07 NOTE — NURSING NOTE
Pvr=0  Pt thinks she may be starting a UTI.  Sx= freq, pulsating feeling in vagina when peeing, pain after 48 hours and dysuria.  Sometimes odorous urine.  Pt has had 2 c sections.  BELKIS Huntley, CMA

## 2018-03-07 NOTE — MR AVS SNAPSHOT
After Visit Summary   3/7/2018    Sarahy Meng    MRN: [id number]           Patient Information     Date Of Birth          1975        Visit Information        Provider Department      3/7/2018 2:30 PM Catrachita Bunch PA-C [facility] [place]        Today's Diagnoses     Personal history of urinary tract infection    -  1      Care Instructions    1. Urinalysis and culture  2. Mycoplasma, Ureaplasma  3. CT scan Please call [phone] to schedule this at [facility].   4. Cystoscopy to look inside the bladder. (Hold for now)  5. Hydrate with water to keep the urine dilute.   6. Standing order for urinalysis, if symptomatic.  7. Lifestyle and hygiene modifications: wiping front to back, wearing cotton breathable underwear, voiding before and after intercourse to flush the urethra, minimizing baths and opting for showers, and appropriate perineal hygiene.     Below is a list of things that can irritate the bladder and should be avoided:      Caffeinated soft drinks.    Coffee.    Tea.    Chocolate.    Tomato-based foods.    Acidic juices and fruits. (includes cranberry juice)    Alcohol.    Carbonated drinks.    Aspartame/Nutrasweet.              Follow-ups after your visit        Follow-up notes from your care team     Return in about 3 months (around 6/7/2018).      Your next 10 appointments already scheduled     Jun 06, 2018  3:30 PM CDT   Return Visit with Catrachita Bunch PA-C   [facility] [place] ([facility])    [address]  [address]  [facility] [address] [phone]              Future tests that were ordered for you today     Open Standing Orders        Priority Remaining Interval Expires Ordered    UA reflex to Microscopic and Culture Routine 5/5  3/7/2019 3/7/2018          Open Future Orders        Priority Expected Expires Ordered    CT Abdomen Pelvis w/o & w  "Contrast Routine  3/7/2019 3/7/2018            Who to contact     If you have questions or need follow up information about today's clinic visit or your schedule please contact [facility] [place] directly at [phone].  Normal or non-critical lab and imaging results will be communicated to you by MyChart, letter or phone within 4 business days after the clinic has received the results. If you do not hear from us within 7 days, please contact the clinic through [employer] or phone. If you have a critical or abnormal lab result, we will notify you by phone as soon as possible.  Submit refill requests through [employer] or call your pharmacy and they will forward the refill request to us. Please allow 3 business days for your refill to be completed.          Additional Information About Your Visit        [employer] Information     [employer] lets you send messages to your doctor, view your test results, renew your prescriptions, schedule appointments and more. To sign up, go to [email or web address] . Click on \"Log in\" on the left side of the screen, which will take you to the Welcome page. Then click on \"Sign up Now\" on the right side of the page.     You will be asked to enter the access code listed below, as well as some personal information. Please follow the directions to create your username and password.     Your access code is: [email or web address]  Expires: 2018  3:05 PM     Your access code will  in 90 days. If you need help or a new code, please call your [place] clinic or [phone].        Care EveryWhere ID     This is your Care EveryWhere ID. This could be used by other organizations to access your [place] medical records  [id number]        Your Vitals Were     Pulse Height Pulse Oximetry BMI (Body Mass Index)          74 1.499 m (4' 11\") 97% 34.74 kg/m2         Blood Pressure from Last 3 Encounters:   18 134/86   18 (!) 132/92   18 122/80    " Weight from Last 3 Encounters:   03/07/18 78 kg (172 lb)   02/12/18 78 kg (172 lb)   04/10/17 79.4 kg (175 lb)              We Performed the Following     Bladder scan     Mycoplasma large colony culture     UA without Microscopic     Ureaplasma culture     Urine Culture Aerobic Bacterial     Urine Micro Urologic Phys        Primary Care Provider Office Phone # Fax #    Katerina Ann Aaseby-Aguilera, PA-C [phone] [address]  [facility] [address]        Equal Access to Services     ADITHYA TERESA : Hadii aad ku hadasho Soomaali, waaxda luqadaha, qaybta kaalmada adeegyada, waxay idiin haygarettn tonny arora . So [facility] [phone].    ATENCIÓN: Si habla español, tiene a smith disposición servicios gratuitos de asistencia lingüística. Elsaame al [phone].    We comply with applicable federal civil rights laws and [place] laws. We do not discriminate on the basis of race, color, national origin, age, disability, sex, sexual orientation, or gender identity.            Thank you!     Thank you for choosing [facility] [place]  for your care. Our goal is always to provide you with excellent care. Hearing back from our patients is one way we can continue to improve our services. Please take a few minutes to complete the written survey that you may receive in the mail after your visit with us. Thank you!             Your Updated Medication List - Protect others around you: Learn how to safely use, store and throw away your medicines at www.disposemymeds.org.      Notice  As of 3/7/2018  2:58 PM    You have not been prescribed any medications.

## 2018-03-07 NOTE — PATIENT INSTRUCTIONS
1. Urinalysis and culture  2. Mycoplasma, Ureaplasma  3. CT scan Please call [phone] to schedule this at [facility].   4. Cystoscopy to look inside the bladder. (Hold for now)  5. Hydrate with water to keep the urine dilute.   6. Standing order for urinalysis, if symptomatic.  7. Lifestyle and hygiene modifications: wiping front to back, wearing cotton breathable underwear, voiding before and after intercourse to flush the urethra, minimizing baths and opting for showers, and appropriate perineal hygiene.     Below is a list of things that can irritate the bladder and should be avoided:      Caffeinated soft drinks.    Coffee.    Tea.    Chocolate.    Tomato-based foods.    Acidic juices and fruits. (includes cranberry juice)    Alcohol.    Carbonated drinks.    Aspartame/Nutrasweet.

## 2018-03-07 NOTE — LETTER
3/7/2018       RE: Sarahy Meng  [address]  [facility] [address]     Dear Colleague,    Thank you for referring your patient, Sarahy Meng, to the [facility] [place] at [facility]. Please see a copy of my visit note below.    CC: Recurrent UTIs    HPI: It was my pleasure to meet Ms. Sarahy Meng, a 42 year old year old female seen in consultation today at the request of Ramona Aaseby-Aguilera, PA-C for recurrent UTIs. She is not immunosuppressed.  Today she complains of a recent history of frequent urinary tract infections, and states has had 4-5 in the last 9 months.  These are typically symptomatic and almost exclusively associated with intercourse.  Typical symptoms include:  Frequency, urgency, burning. She denies gross hematuria, flank pain, fevers, chills.  She has no history of stones.  She has never been hospitalized for UTIs.       With infection, Ms. Sarahy Meng typically goes to Ms. Aaseby-Aguilera's office where cultures are performed (see beEpic).  Is usually given an antibiotic and reports that symptoms do resolve appropriately with therapy. Therapies that have been tried include: Bactrim, Macrobid.     -Inciting events include: Patient does not hold urine beyond the urge to void  -Daily Fluid intake: water with minimal caffeine.    -Bowels: reg   -Gynecologic Hx:  CSx    Past Medical History:   Diagnosis Date     Contact dermatitis and other eczema, due to unspecified cause      Depressive disorder, not elsewhere classified      Esophageal reflux        Past Surgical History:   Procedure Laterality Date     C NONSPECIFIC PROCEDURE       - boy 89     C NONSPECIFIC PROCEDURE      R. [employer] store        FAMILY HISTORY: Denies family history of urologic cancer.     SOCIAL HISTORY:    reports that she has been smoking Cigarettes.  She has smoked for the past 15.00 years. She has never used  "smokeless tobacco.    No current outpatient prescriptions on file.       ALLERGIES: No known drug allergies      REVIEW OF SYSTEMS: 14-pt ROS was negative other than that noted in the HPI.    GENERAL PHYSICAL EXAM:   Pulse 74  Ht 1.499 m (4' 11\")  Wt 78 kg (172 lb)  SpO2 97%  BMI 34.74 kg/m2    GENERAL: Well groomed, well developed, well nourished female in NAD.  HEAD: Normocephalic. No masses, lesions, tenderness or abnormalities  NECK: Neck supple. No adenopathy. Thyroid symmetric, normal size  ENT: EOMI  RESPIRATORY: CTAB. No increased respiratory effort.   GI: Soft, NT  MS: Full ROM in extremities, gait normal.  SKIN: Warm to touch, dry.    EXT: No LE edema.  NEURO: Alert and oriented x 3.  PSYCH: Normal mood and affect, pleasant and agreeable during interview and exam.     PVR: Residual urine by ultrasound was 0 ml.      Urine - 4 UTIs in 12 months (see Epic)  Today: trace blood, leuk est +    ASSESSMENT:   Recurrent UTIs, associated with intercourse    PLAN:   UA/UC, micro  Mycoplasma, Ureaplasma  CT Urogram (will call with results)  Bladder irritant list provided  Hydrate, standing order for UA reflex to cx if symptomatic.  - Lifestyle and hygiene modifications were reviewed today in clinic, including wiping front to back, wearing cotton breathable underwear, voiding before and after intercourse to flush the urethra, minimizing baths and opting for showers, and appropriate perineal hygiene. Push fluids to keep the urine dilute  -Post-intercourse Bactrim, prn    RTO 3 months. If breakthrough UTIs, will consider Trimethoprim 100mg daily x 90 days and cysto.       30 min spent with the patient in a face to face manner, >50% of that time spent on counseling and coordination of care of UTI.       Again, thank you for allowing me to participate in the care of your patient.      Sincerely,    Catrachita Bunch PA-C, [facility]      "

## 2018-03-07 NOTE — PROGRESS NOTES
"CC: Recurrent UTIs    HPI: It was my pleasure to meet Ms. Sarahy Meng, a 42 year old year old female seen in consultation today at the request of Ramona Aaseby-Aguilera, PA-C for recurrent UTIs. She is not immunosuppressed.  Today she complains of a recent history of frequent urinary tract infections, and states has had 4-5 in the last 9 months.  These are typically symptomatic and almost exclusively associated with intercourse.  Typical symptoms include:  Frequency, urgency, burning. She denies gross hematuria, flank pain, fevers, chills.  She has no history of stones.  She has never been hospitalized for UTIs.       With infection, Ms. Sarahy Meng typically goes to Ms. Aaseby-Aguilera's office where cultures are performed (see beEpic).  Is usually given an antibiotic and reports that symptoms do resolve appropriately with therapy. Therapies that have been tried include: Bactrim, Macrobid.     -Inciting events include: Patient does not hold urine beyond the urge to void  -Daily Fluid intake: water with minimal caffeine.    -Bowels: reg   -Gynecologic Hx:  CSx    Past Medical History:   Diagnosis Date     Contact dermatitis and other eczema, due to unspecified cause      Depressive disorder, not elsewhere classified      Esophageal reflux        Past Surgical History:   Procedure Laterality Date     C NONSPECIFIC PROCEDURE       - boy 89     C NONSPECIFIC PROCEDURE      R. [employer] store        FAMILY HISTORY: Denies family history of urologic cancer.     SOCIAL HISTORY:    reports that she has been smoking Cigarettes.  She has smoked for the past 15.00 years. She has never used smokeless tobacco.    No current outpatient prescriptions on file.       ALLERGIES: No known drug allergies      REVIEW OF SYSTEMS: 14-pt ROS was negative other than that noted in the HPI.    GENERAL PHYSICAL EXAM:   Pulse 74  Ht 1.499 m (4' 11\")  Wt 78 kg (172 lb)  SpO2 97%  BMI 34.74 kg/m2    GENERAL: Well groomed, " well developed, well nourished female in NAD.  HEAD: Normocephalic. No masses, lesions, tenderness or abnormalities  NECK: Neck supple. No adenopathy. Thyroid symmetric, normal size  ENT: EOMI  RESPIRATORY: CTAB. No increased respiratory effort.   GI: Soft, NT  MS: Full ROM in extremities, gait normal.  SKIN: Warm to touch, dry.    EXT: No LE edema.  NEURO: Alert and oriented x 3.  PSYCH: Normal mood and affect, pleasant and agreeable during interview and exam.     PVR: Residual urine by ultrasound was 0 ml.      Urine - 4 UTIs in 12 months (see Epic)  Today: trace blood, leuk est +    ASSESSMENT:   Recurrent UTIs, associated with intercourse    PLAN:   UA/UC, micro  Mycoplasma, Ureaplasma  CT Urogram (will call with results)  Bladder irritant list provided  Hydrate, standing order for UA reflex to cx if symptomatic.  - Lifestyle and hygiene modifications were reviewed today in clinic, including wiping front to back, wearing cotton breathable underwear, voiding before and after intercourse to flush the urethra, minimizing baths and opting for showers, and appropriate perineal hygiene. Push fluids to keep the urine dilute  -Post-intercourse Bactrim, prn    RTO 3 months. If breakthrough UTIs, will consider Trimethoprim 100mg daily x 90 days and cysto.     Catrachita Bunch PA-C  [facility]    30 min spent with the patient in a face to face manner, >50% of that time spent on counseling and coordination of care of UTI.

## 2018-03-09 DIAGNOSIS — N39.0 URINARY TRACT INFECTION: Primary | ICD-10-CM

## 2018-03-09 LAB
BACTERIA SPEC CULT: ABNORMAL
BACTERIA SPEC CULT: ABNORMAL
Lab: ABNORMAL
Lab: ABNORMAL
SPECIMEN SOURCE: ABNORMAL
SPECIMEN SOURCE: ABNORMAL

## 2018-03-09 RX ORDER — CIPROFLOXACIN 500 MG/1
500 TABLET, FILM COATED ORAL 2 TIMES DAILY
Qty: 14 TABLET | Refills: 0 | Status: SHIPPED | OUTPATIENT
Start: 2018-03-09 | End: 2018-09-13

## 2018-03-14 LAB
BACTERIA SPEC CULT: NORMAL
Lab: NORMAL
SPECIMEN SOURCE: NORMAL

## 2018-03-19 ENCOUNTER — TELEPHONE (OUTPATIENT)
Dept: UROLOGY | Facility: CLINIC | Age: 43
End: 2018-03-19

## 2018-03-19 ENCOUNTER — HOSPITAL ENCOUNTER (OUTPATIENT)
Dept: CT IMAGING | Facility: CLINIC | Age: 43
Discharge: HOME | End: 2018-03-19
Attending: PHYSICIAN ASSISTANT | Admitting: PHYSICIAN ASSISTANT
Payer: COMMERCIAL

## 2018-03-19 DIAGNOSIS — Z87.440 PERSONAL HISTORY OF URINARY TRACT INFECTION: ICD-10-CM

## 2018-03-19 PROCEDURE — 25000128 H RX IP 250 OP 636: Performed by: RADIOLOGY

## 2018-03-19 PROCEDURE — 74178 CT ABD&PLV WO CNTR FLWD CNTR: CPT

## 2018-03-19 RX ORDER — IOPAMIDOL 755 MG/ML
500 INJECTION, SOLUTION INTRAVASCULAR ONCE
Status: COMPLETED | OUTPATIENT
Start: 2018-03-19 | End: 2018-03-19

## 2018-03-19 RX ADMIN — SODIUM CHLORIDE 65 ML: 9 INJECTION, SOLUTION INTRAVENOUS at 11:37

## 2018-03-19 RX ADMIN — IOPAMIDOL 100 ML: 755 INJECTION, SOLUTION INTRAVENOUS at 11:37

## 2018-04-07 ENCOUNTER — HEALTH MAINTENANCE LETTER (OUTPATIENT)
Age: 43
End: 2018-04-07

## 2018-09-13 ENCOUNTER — OFFICE VISIT (OUTPATIENT)
Dept: OBGYN | Facility: CLINIC | Age: 43
End: 2018-09-13
Payer: COMMERCIAL

## 2018-09-13 VITALS
BODY MASS INDEX: 34.27 KG/M2 | WEIGHT: 170 LBS | SYSTOLIC BLOOD PRESSURE: 128 MMHG | DIASTOLIC BLOOD PRESSURE: 80 MMHG | HEIGHT: 59 IN

## 2018-09-13 DIAGNOSIS — N94.9 VAGINAL LUMP: Primary | ICD-10-CM

## 2018-09-13 DIAGNOSIS — Z11.51 SCREENING FOR HUMAN PAPILLOMAVIRUS: ICD-10-CM

## 2018-09-13 DIAGNOSIS — Z12.4 SCREENING FOR CERVICAL CANCER: ICD-10-CM

## 2018-09-13 DIAGNOSIS — N89.8 VAGINAL DISCHARGE: ICD-10-CM

## 2018-09-13 LAB
SPECIMEN SOURCE: ABNORMAL
WET PREP SPEC: ABNORMAL

## 2018-09-13 PROCEDURE — G0145 SCR C/V CYTO,THINLAYER,RESCR: HCPCS | Performed by: ADVANCED PRACTICE MIDWIFE

## 2018-09-13 PROCEDURE — 87624 HPV HI-RISK TYP POOLED RSLT: CPT | Performed by: ADVANCED PRACTICE MIDWIFE

## 2018-09-13 PROCEDURE — 87210 SMEAR WET MOUNT SALINE/INK: CPT | Performed by: ADVANCED PRACTICE MIDWIFE

## 2018-09-13 PROCEDURE — 99213 OFFICE O/P EST LOW 20 MIN: CPT | Performed by: ADVANCED PRACTICE MIDWIFE

## 2018-09-13 NOTE — MR AVS SNAPSHOT
"              After Visit Summary   9/13/2018    Sarahy Meng    MRN: [id number]           Patient Information     Date Of Birth          1975        Visit Information        Provider Department      9/13/2018 3:15 PM Lazara Stratton APRN CNM [facility]        Today's Diagnoses     Vaginal lump    -  1    Vaginal discharge        Screening for cervical cancer        Screening for human papillomavirus           Follow-ups after your visit        Follow-up notes from your care team     Return in about 2 weeks (around 9/27/2018).      Your next 10 appointments already scheduled     Sep 27, 2018  8:30 AM CDT   PHYSICAL with KAYE Stone CNM   [facility] ([facility])    [address] [facility] [address] [phone]              Who to contact     If you have questions or need follow up information about today's clinic visit or your schedule please contact [facility] directly at [phone].  Normal or non-critical lab and imaging results will be communicated to you by MyChart, letter or phone within 4 business days after the clinic has received the results. If you do not hear from us within 7 days, please contact the clinic through MyChart or phone. If you have a critical or abnormal lab result, we will notify you by phone as soon as possible.  Submit refill requests through [employer] or call your pharmacy and they will forward the refill request to us. Please allow 3 business days for your refill to be completed.          Additional Information About Your Visit        Care EveryWhere ID     This is your Care EveryWhere ID. This could be used by other organizations to access your [facility] medical records  [id number]        Your Vitals Were     Height Last Period Breastfeeding? BMI (Body Mass Index)          4' 11\" (1.499 m) (LMP Unknown) No 34.34 kg/m2         Blood Pressure from Last 3 Encounters: "   09/13/18 128/80   02/12/18 134/86   01/28/18 (!) 132/92    Weight from Last 3 Encounters:   09/13/18 170 lb (77.1 kg)   03/07/18 172 lb (78 kg)   02/12/18 172 lb (78 kg)              We Performed the Following     HPV High Risk Types DNA Cervical     PAP imaged thin layer screen     Wet prep        Primary Care Provider Office Phone # Fax #    Katerina Ann Aaseby-Aguilera, PA-C [phone] [address] [facility] [address]        Equal Access to Services     CHRIST TERESA : Hadii hardeep Springer, watyrell lockett, qasilvia kaalmacoby mazariegos, kathleen arora . [facility] [phone].    ATENCIÓN: Si habla español, tiene a smith disposición servicios gratuitos de asistencia lingüística. [facility] [phone].    We comply with applicable federal civil rights laws and [place] laws. We do not discriminate on the basis of race, color, national origin, age, disability, sex, sexual orientation, or gender identity.            Thank you!     Thank you for choosing [facility]  for your care. Our goal is always to provide you with excellent care. Hearing back from our patients is one way we can continue to improve our services. Please take a few minutes to complete the written survey that you may receive in the mail after your visit with us. Thank you!             Your Updated Medication List - Protect others around you: Learn how to safely use, store and throw away your medicines at www.disposemymeds.org.      Notice  As of 9/13/2018  3:48 PM    You have not been prescribed any medications.

## 2018-09-13 NOTE — PROGRESS NOTES
SUBJECTIVE:                                                   Sarahy Meng is a 42 year old who presents to clinic today for the following health issue(s):  Patient presents with:  Vaginal Problem: vaginal bump that is painful       HPI:  Sarahy presents reporting a vaginal lump. She noticied it about a week ago and it has gotten bigger and more painful. Only one lump noted. Patient also reports vaginal discharge with itching and odor. She reports no risk of STI. No irregular bleeding. She has had her Mirena in for 7 years. Due for a pap.     No LMP recorded (lmp unknown). Patient is not currently having periods (Reason: IUD).  Menstrual History: amenorrhea due to Mirena  Patient is sexually active  No obstetric history on file..  Using IUD for contraception.   Health maintenance updated:  yes  STI infx testing offered:  Declined    Last PHQ-9 score on record =   PHQ-9 SCORE 2017   Total Score -   Total Score 6     Last GAD7 score on record =   ATM-7 SCORE 2017   Total Score -   Total Score 10     Alcohol Score = 0    Problem list and histories reviewed & adjusted, as indicated.  Additional history: as documented.    Patient Active Problem List   Diagnosis     Esophageal reflux     Tobacco abuse     Obesity     CARDIOVASCULAR SCREENING; LDL GOAL LESS THAN 160     Anxiety     Past Surgical History:   Procedure Laterality Date     C NONSPECIFIC PROCEDURE       - boy 89     C NONSPECIFIC PROCEDURE      R. [employer]       Social History   Substance Use Topics     Smoking status: Current Every Day Smoker     Years: 15.00     Types: Cigarettes     Smokeless tobacco: Never Used      Comment: 5 ciggs a day     Alcohol use Yes      Comment: socially      Problem (# of Occurrences) Relation (Name,Age of Onset)    Arthritis (2) Maternal Grandfather, Mother    Cancer (1) Maternal Grandfather: lung    Cerebrovascular Disease (1) Maternal Grandfather    Diabetes (1) Maternal Grandfather    HEART  "DISEASE (1) Maternal Grandfather    Hypertension (1) Mother    Thyroid Disease (1) Mother       Negative family history of: Breast Cancer, Cancer - colorectal            No current outpatient prescriptions on file.     No current facility-administered medications for this visit.      Allergies   Allergen Reactions     No Known Drug Allergies        ROS:  CONSTITUTIONAL: NEGATIVE for fever, chills, change in weight  INTEGUMENTARU/SKIN: NEGATIVE for worrisome rashes, moles or lesions  EYES: NEGATIVE for vision changes or irritation  ENT: NEGATIVE for ear, mouth and throat problems  RESP: NEGATIVE for significant cough or SOB  BREAST: NEGATIVE for masses, tenderness or discharge  CV: NEGATIVE for chest pain, palpitations or peripheral edema  GI: NEGATIVE for nausea, abdominal pain, heartburn, or change in bowel habits  : Positive for vaginal lump and vaginal discharge with itching, and odor.  MUSCULOSKELETAL: NEGATIVE for significant arthralgias or myalgia  NEURO: NEGATIVE for weakness, dizziness or paresthesias  PSYCHIATRIC: NEGATIVE for changes in mood or affect    OBJECTIVE:     /80 (BP Location: Right arm, Patient Position: Sitting, Cuff Size: Adult Regular)  Ht 4' 11\" (1.499 m)  Wt 170 lb (77.1 kg)  LMP  (LMP Unknown)  Breastfeeding? No  BMI 34.34 kg/m2  Body mass index is 34.34 kg/(m^2).    PHYSICAL EXAM:  Constitutional:  Appearance: Well nourished, well developed alert, in no acute distress  Chest:  Respiratory Effort:  Breathing unlabored  Skin:General Inspection:  No rashes present, no lesions present, no areas of discoloration; Genitalia and Groin:  No rashes present, no lesions present, no areas of discoloration, no masses present.  Neurologic/Psychiatric:  Mental Status:  Oriented X3   Pelvic Exam:  External Genitalia:     Normal appearance for age, no discharge present, no tenderness present, painful lump noted on left labia major, no bleeding or discahrge, color normal  Vagina:     Normal " vaginal vault without central or paravaginal defects, no discharge present, no inflammatory lesions present, no masses present  Bladder:     Nontender to palpation  Urethra:   Urethral Body:  Urethra palpation normal, urethra structural support normal   Urethral Meatus:  No erythema or lesions present  Cervix:     Appearance healthy, no lesions present, nontender to palpation, no bleeding present  Uterus:     Uterus: firm, normal sized and nontender, midplane in position.   Adnexa:     No adnexal tenderness present, no adnexal masses present  Perineum:     Perineum within normal limits, no evidence of trauma, no rashes or skin lesions present  Anus:     Anus within normal limits, no hemorrhoids present  Inguinal Lymph Nodes:     No lymphadenopathy present  Pubic Hair:     Normal pubic hair distribution for age  Genitalia and Groin:     No rashes present, no lesions present, no areas of discoloration, no masses present       In-Clinic Test Results:  No results found for this or any previous visit (from the past 24 hour(s)).    ASSESSMENT/PLAN:                                                        ICD-10-CM    1. Vaginal lump N94.9    2. Vaginal discharge N89.8 Wet prep   3. Screening for cervical cancer Z12.4 PAP imaged thin layer screen   4. Screening for human papillomavirus Z11.51 HPV High Risk Types DNA Cervical       PLAN:    Wet prep and pap collected. Discussed that sje should keep her scheduled appointment for two weeks to replace her IUD. Discussed likely labial cyst. Disucssed taking a warm bath nightly. Rx: clindamycin 300mg PO q8hrs x7 days. Educated on medication. Will notify patient of abnormal results that come through. Patient verbalizes understanding and agrees with plan. Keep next scheduled appointment.     Lazara Stratton CNM

## 2018-09-13 NOTE — NURSING NOTE
"Chief Complaint   Patient presents with     Vaginal Problem     vaginal bump that is painful        Initial /80 (BP Location: Right arm, Patient Position: Sitting, Cuff Size: Adult Regular)  Ht 4' 11\" (1.499 m)  Wt 170 lb (77.1 kg)  LMP  (LMP Unknown)  Breastfeeding? No  BMI 34.34 kg/m2 Estimated body mass index is 34.34 kg/(m^2) as calculated from the following:    Height as of this encounter: 4' 11\" (1.499 m).    Weight as of this encounter: 170 lb (77.1 kg).  BP completed using cuff size: regular    No obstetric history on file.    The following HM Due: pap smear      The following patient reported/Care Every where data was sent to:  P ABSTRACT QUALITY INITIATIVES [[id number]]      patient has appointment for today.  Dakota Mcpherson CMA                 "

## 2018-09-13 NOTE — LETTER
September 24, 2018    Sarahy Meng  [address] [facility] [address]    Dear Sarahy,  We are happy to inform you that your PAP smear result from 9/13/18 is normal.  We are now able to do a follow up test on PAP smears. The DNA test is for HPV (Human Papilloma Virus). Cervical cancer is closely linked with certain types of HPV. Your results showed no evidence of high risk HPV.  Therefore we recommend you return in 5 years for your next pap smear and HPV test.  You will still need to return to the clinic every year for an annual exam and other preventive tests.  Please contact the clinic at [phone] with any questions.  Sincerely,    KAYE Carvajal CNM/emi

## 2018-09-14 ENCOUNTER — TELEPHONE (OUTPATIENT)
Dept: OBGYN | Facility: CLINIC | Age: 43
End: 2018-09-14

## 2018-09-14 DIAGNOSIS — N76.0 BACTERIAL VAGINITIS: Primary | ICD-10-CM

## 2018-09-14 DIAGNOSIS — B96.89 BACTERIAL VAGINITIS: Primary | ICD-10-CM

## 2018-09-14 NOTE — TELEPHONE ENCOUNTER
Attempted to call patient to review wet prep results. Wet prep showed +BV. Patient can be treated with either metrogel or  Metronidazole 500mg PO BID x7 days. Patient should be sure that she has completed this course of treatment prior to IUD removal and reinsertion. Thanks, Lazara Stratton CNM

## 2018-09-19 LAB
COPATH REPORT: NORMAL
PAP: NORMAL

## 2018-09-21 LAB
FINAL DIAGNOSIS: NORMAL
HPV HR 12 DNA CVX QL NAA+PROBE: NEGATIVE
HPV16 DNA SPEC QL NAA+PROBE: NEGATIVE
HPV18 DNA SPEC QL NAA+PROBE: NEGATIVE
SPECIMEN DESCRIPTION: NORMAL
SPECIMEN SOURCE CVX/VAG CYTO: NORMAL

## 2018-09-26 NOTE — TELEPHONE ENCOUNTER
ABE Haile, we have been unable to reach the patient with her results. She is scheduled for her annual/IUD appointment tomorrow.      Jazzy Chamorro RN

## 2018-09-27 RX ORDER — METRONIDAZOLE 7.5 MG/G
1 GEL VAGINAL AT BEDTIME
Qty: 70 G | Refills: 0 | Status: SHIPPED | OUTPATIENT
Start: 2018-09-27 | End: 2019-03-14

## 2018-09-27 NOTE — TELEPHONE ENCOUNTER
Received call from OB RN, patient has been reached. She desires treatment with metrogel. Rx sent to pharmacy. Advised patient to reschedule IUD replacement for after completion of treatment. Lazara Stratton CNM

## 2019-01-03 ENCOUNTER — OFFICE VISIT (OUTPATIENT)
Dept: OBGYN | Facility: CLINIC | Age: 44
End: 2019-01-03
Payer: COMMERCIAL

## 2019-01-03 VITALS
HEIGHT: 59 IN | DIASTOLIC BLOOD PRESSURE: 82 MMHG | BODY MASS INDEX: 34.76 KG/M2 | WEIGHT: 172.4 LBS | SYSTOLIC BLOOD PRESSURE: 120 MMHG

## 2019-01-03 DIAGNOSIS — Z30.432 ENCOUNTER FOR IUD REMOVAL: Primary | ICD-10-CM

## 2019-01-03 DIAGNOSIS — Z01.419 WOMEN'S ANNUAL ROUTINE GYNECOLOGICAL EXAMINATION: ICD-10-CM

## 2019-01-03 PROCEDURE — 58301 REMOVE INTRAUTERINE DEVICE: CPT | Performed by: ADVANCED PRACTICE MIDWIFE

## 2019-01-03 PROCEDURE — 58300 INSERT INTRAUTERINE DEVICE: CPT | Mod: 53 | Performed by: ADVANCED PRACTICE MIDWIFE

## 2019-01-03 ASSESSMENT — MIFFLIN-ST. JEOR: SCORE: 1332.63

## 2019-01-03 NOTE — NURSING NOTE
"Chief Complaint   Patient presents with     IUD     removal and replace harry       Initial /82   Ht 1.499 m (4' 11\")   Wt 78.2 kg (172 lb 6.4 oz)   BMI 34.82 kg/m   Estimated body mass index is 34.82 kg/m  as calculated from the following:    Height as of this encounter: 1.499 m (4' 11\").    Weight as of this encounter: 78.2 kg (172 lb 6.4 oz).  BP completed using cuff size: regular    Questioned patient about current smoking habits.  Pt. currently smokes.  Advised about smoking cessation.      No obstetric history on file.    The following HM Due: NONE      The following patient reported/Care Every where data was sent to:  P ABSTRACT QUALITY INITIATIVES [[id number]]  Gaetano Aparicio MA        "

## 2019-01-03 NOTE — PROGRESS NOTES
Sarahy Meng is a 43 year old female,No obstetric history on file., No LMP recorded. Patient is not currently having periods (Reason: IUD). who presents today for IUD removal. Her current Mirena  IUD was placed 7 ago. She has not had problems with the IUD. She requests removal of the IUD because the IUD effectiveness has . She is interested in immediate replacement of a new Mirena IUD.      Procedure:  After consent was obtained from the patient, IUD strings were grasped with ring forcep and IUD easily removed intact with minimal patient discomfort noted.  No bleeding noted.    The cervix was then swabbed with a betadine prep and tenaculum was placed on the cervix. Uterus sounded to 5 cm. Based on being unable to sound the uterus to a sufficient measurement, a new IUD was not placed. Discussed with patient risk of perforation and improperly placed IUD. Patient opts to not start any method of contraception. She will have an FSH and LH drawn in 3 months to assess her menopausal state and see if she will need contraception. Her  has a vasectomy. Patient also due for mammogram which was ordered.     She  tolerated the procedure well. There were no complications. Patient was discharged in stable condition.    Lazara Stratton CNM

## 2019-01-10 ENCOUNTER — OFFICE VISIT (OUTPATIENT)
Dept: FAMILY MEDICINE | Facility: CLINIC | Age: 44
End: 2019-01-10
Payer: COMMERCIAL

## 2019-01-10 VITALS
HEIGHT: 60 IN | WEIGHT: 170 LBS | HEART RATE: 66 BPM | BODY MASS INDEX: 33.38 KG/M2 | TEMPERATURE: 98.5 F | DIASTOLIC BLOOD PRESSURE: 77 MMHG | SYSTOLIC BLOOD PRESSURE: 126 MMHG

## 2019-01-10 DIAGNOSIS — E66.09 CLASS 1 OBESITY DUE TO EXCESS CALORIES WITHOUT SERIOUS COMORBIDITY WITH BODY MASS INDEX (BMI) OF 33.0 TO 33.9 IN ADULT: ICD-10-CM

## 2019-01-10 DIAGNOSIS — Z00.00 ROUTINE GENERAL MEDICAL EXAMINATION AT A HEALTH CARE FACILITY: Primary | ICD-10-CM

## 2019-01-10 DIAGNOSIS — Z72.0 TOBACCO ABUSE: ICD-10-CM

## 2019-01-10 DIAGNOSIS — Z13.220 SCREENING, LIPID: ICD-10-CM

## 2019-01-10 DIAGNOSIS — N63.0 LUMP OR MASS IN BREAST: ICD-10-CM

## 2019-01-10 DIAGNOSIS — Z13.1 SCREENING FOR DIABETES MELLITUS: ICD-10-CM

## 2019-01-10 DIAGNOSIS — E66.811 CLASS 1 OBESITY DUE TO EXCESS CALORIES WITHOUT SERIOUS COMORBIDITY WITH BODY MASS INDEX (BMI) OF 33.0 TO 33.9 IN ADULT: ICD-10-CM

## 2019-01-10 DIAGNOSIS — Z13.29 SCREENING FOR THYROID DISORDER: ICD-10-CM

## 2019-01-10 DIAGNOSIS — Z13.0 SCREENING FOR BLOOD DISEASE: ICD-10-CM

## 2019-01-10 LAB
ALBUMIN SERPL-MCNC: 4 G/DL (ref 3.4–5)
ALP SERPL-CCNC: 51 U/L (ref 40–150)
ALT SERPL W P-5'-P-CCNC: 38 U/L (ref 0–50)
ANION GAP SERPL CALCULATED.3IONS-SCNC: 5 MMOL/L (ref 3–14)
AST SERPL W P-5'-P-CCNC: 11 U/L (ref 0–45)
BILIRUB SERPL-MCNC: 0.4 MG/DL (ref 0.2–1.3)
BUN SERPL-MCNC: 10 MG/DL (ref 7–30)
CALCIUM SERPL-MCNC: 9.4 MG/DL (ref 8.5–10.1)
CHLORIDE SERPL-SCNC: 106 MMOL/L (ref 94–109)
CHOLEST SERPL-MCNC: 240 MG/DL
CO2 SERPL-SCNC: 27 MMOL/L (ref 20–32)
CREAT SERPL-MCNC: 0.57 MG/DL (ref 0.52–1.04)
ERYTHROCYTE [DISTWIDTH] IN BLOOD BY AUTOMATED COUNT: 11.9 % (ref 10–15)
GFR SERPL CREATININE-BSD FRML MDRD: >90 ML/MIN/{1.73_M2}
GLUCOSE SERPL-MCNC: 102 MG/DL (ref 70–99)
HCT VFR BLD AUTO: 40.7 % (ref 35–47)
HDLC SERPL-MCNC: 33 MG/DL
HGB BLD-MCNC: 13.9 G/DL (ref 11.7–15.7)
LDLC SERPL CALC-MCNC: 180 MG/DL
MCH RBC QN AUTO: 33.5 PG (ref 26.5–33)
MCHC RBC AUTO-ENTMCNC: 34.2 G/DL (ref 31.5–36.5)
MCV RBC AUTO: 98 FL (ref 78–100)
NONHDLC SERPL-MCNC: 207 MG/DL
PLATELET # BLD AUTO: 300 10E9/L (ref 150–450)
POTASSIUM SERPL-SCNC: 4.1 MMOL/L (ref 3.4–5.3)
PROT SERPL-MCNC: 7.6 G/DL (ref 6.8–8.8)
RBC # BLD AUTO: 4.15 10E12/L (ref 3.8–5.2)
SODIUM SERPL-SCNC: 138 MMOL/L (ref 133–144)
TRIGL SERPL-MCNC: 136 MG/DL
TSH SERPL DL<=0.005 MIU/L-ACNC: 1.09 MU/L (ref 0.4–4)
WBC # BLD AUTO: 5.7 10E9/L (ref 4–11)

## 2019-01-10 PROCEDURE — 80053 COMPREHEN METABOLIC PANEL: CPT | Performed by: PHYSICIAN ASSISTANT

## 2019-01-10 PROCEDURE — 36415 COLL VENOUS BLD VENIPUNCTURE: CPT | Performed by: PHYSICIAN ASSISTANT

## 2019-01-10 PROCEDURE — 84443 ASSAY THYROID STIM HORMONE: CPT | Performed by: PHYSICIAN ASSISTANT

## 2019-01-10 PROCEDURE — 99214 OFFICE O/P EST MOD 30 MIN: CPT | Mod: 25 | Performed by: PHYSICIAN ASSISTANT

## 2019-01-10 PROCEDURE — 80061 LIPID PANEL: CPT | Performed by: PHYSICIAN ASSISTANT

## 2019-01-10 PROCEDURE — 99396 PREV VISIT EST AGE 40-64: CPT | Performed by: PHYSICIAN ASSISTANT

## 2019-01-10 PROCEDURE — 85027 COMPLETE CBC AUTOMATED: CPT | Performed by: PHYSICIAN ASSISTANT

## 2019-01-10 RX ORDER — VARENICLINE TARTRATE 1 MG/1
1 TABLET, FILM COATED ORAL 2 TIMES DAILY
Qty: 180 TABLET | Refills: 3 | Status: SHIPPED | OUTPATIENT
Start: 2019-01-10 | End: 2019-03-14

## 2019-01-10 ASSESSMENT — ENCOUNTER SYMPTOMS
DYSURIA: 0
SORE THROAT: 0
MYALGIAS: 0
CONSTIPATION: 0
SHORTNESS OF BREATH: 0
HEMATURIA: 0
PALPITATIONS: 0
DIZZINESS: 0
NERVOUS/ANXIOUS: 0
HEARTBURN: 0
JOINT SWELLING: 0
COUGH: 0
HEMATOCHEZIA: 0
DIARRHEA: 1
HEADACHES: 0
FEVER: 0
BREAST MASS: 1
PARESTHESIAS: 1
ABDOMINAL PAIN: 0
WEAKNESS: 0
CHILLS: 1
ARTHRALGIAS: 0
EYE PAIN: 0
FREQUENCY: 1
NAUSEA: 0

## 2019-01-10 ASSESSMENT — MIFFLIN-ST. JEOR: SCORE: 1333.64

## 2019-01-10 NOTE — LETTER
"January 21, 2019      Sarahy ZAMORA Taqueria  [address] [facility] [address]        Dear ,    We are writing to inform you of your test results.    The results of your recent total cholesterol test were elevated.  Your total cholesterol should be less than 200.    The primary goal of therapy is the LDL or \"bad\" cholesterol.  Your LDL level was elevated.  Your LDL goal based on risk factors (i.e. smoking, family history, high blood pressure, low HDL cholesterol) and age is 160.    Your HDL, or \"good\" cholesterol is abnormal.  Your goal is an HDL level above 40 if you are male and 50 if you are female    Triglycerides are another cholesterol component that is associated with heart disease.  Normal triglycerides are less than 150.  Your   triglyceride level is normal.    I would recommend: increase daily fiber intake, weight loss, start Omega-3 fatty acids, 1000mg 2-3 times daily, increase physical activity with a goal of 150 minutes moderate exercise weekly, decrease saturated fat intake to less than 7% of total calories and repeat fasting lipids and liver tests in 12 months.        The rest of your labs are within acceptable limits.   Resulted Orders   Lipid panel reflex to direct LDL Fasting   Result Value Ref Range    Cholesterol 240 (H) <200 mg/dL      Comment:      Desirable:       <200 mg/dl    Triglycerides 136 <150 mg/dL    HDL Cholesterol 33 (L) >49 mg/dL    LDL Cholesterol Calculated 180 (H) <100 mg/dL      Comment:      Above desirable:  100-129 mg/dl  Borderline High:  130-159 mg/dL  High:             160-189 mg/dL  Very high:       >189 mg/dl      Non HDL Cholesterol 207 (H) <130 mg/dL      Comment:      Above Desirable:  130-159 mg/dl  Borderline high:  160-189 mg/dl  High:             190-219 mg/dl  Very high:       >219 mg/dl     CBC with platelets   Result Value Ref Range    WBC 5.7 4.0 - 11.0 10e9/L    RBC Count 4.15 3.8 - 5.2 10e12/L    Hemoglobin 13.9 11.7 - 15.7 g/dL    Hematocrit 40.7 " 35.0 - 47.0 %    MCV 98 78 - 100 fl    MCH 33.5 (H) 26.5 - 33.0 pg    MCHC 34.2 31.5 - 36.5 g/dL    RDW 11.9 10.0 - 15.0 %    Platelet Count 300 150 - 450 10e9/L   TSH with free T4 reflex   Result Value Ref Range    TSH 1.09 0.40 - 4.00 mU/L   Comprehensive metabolic panel   Result Value Ref Range    Sodium 138 133 - 144 mmol/L    Potassium 4.1 3.4 - 5.3 mmol/L    Chloride 106 94 - 109 mmol/L    Carbon Dioxide 27 20 - 32 mmol/L    Anion Gap 5 3 - 14 mmol/L    Glucose 102 (H) 70 - 99 mg/dL    Urea Nitrogen 10 7 - 30 mg/dL    Creatinine 0.57 0.52 - 1.04 mg/dL    GFR Estimate >90 >60 mL/min/[1.73_m2]      Comment:      Non  GFR Calc  Starting 12/18/2018, serum creatinine based estimated GFR (eGFR) will be   calculated using the Chronic Kidney Disease Epidemiology Collaboration   (CKD-EPI) equation.      GFR Estimate If Black >90 >60 mL/min/[1.73_m2]      Comment:       GFR Calc  Starting 12/18/2018, serum creatinine based estimated GFR (eGFR) will be   calculated using the Chronic Kidney Disease Epidemiology Collaboration   (CKD-EPI) equation.      Calcium 9.4 8.5 - 10.1 mg/dL    Bilirubin Total 0.4 0.2 - 1.3 mg/dL    Albumin 4.0 3.4 - 5.0 g/dL    Protein Total 7.6 6.8 - 8.8 g/dL    Alkaline Phosphatase 51 40 - 150 U/L    ALT 38 0 - 50 U/L    AST 11 0 - 45 U/L       If you have any questions or concerns, please call the clinic at the number listed above.       Sincerely,        Ramona Ann Aaseby-Aguilera, PA-C/Ines Rubin

## 2019-01-10 NOTE — PROGRESS NOTES
SUBJECTIVE:   CC: Sarahy Meng is an 43 year old woman who presents for preventive health visit.     Physical   Annual:     Getting at least 3 servings of Calcium per day:  Yes    Bi-annual eye exam:  Yes    Dental care twice a year:  Yes    Sleep apnea or symptoms of sleep apnea:  None    Diet:  Carbohydrate counting and Other    Frequency of exercise:  2-3 days/week    Duration of exercise:  15-30 minutes    Taking medications regularly:  Yes    Medication side effects:  Not applicable    Additional concerns today:  No    PHQ-2 Total Score: 1          1)  Consult stop smoking.has been smoking since age 10 off and on    2) weight loss: would like to go back on phentermine      Today's PHQ-2 Score:   PHQ-2 ( 1999 Pf[employer]) 1/10/2019   Q1: Little interest or pleasure in doing things 1   Q2: Feeling down, depressed or hopeless 0   PHQ-2 Score 1   Q1: Little interest or pleasure in doing things Several days   Q2: Feeling down, depressed or hopeless Not at all   PHQ-2 Score 1       Abuse: Current or Past(Physical, Sexual or Emotional)- No  Do you feel safe in your environment? Yes    Social History     Tobacco Use     Smoking status: Current Every Day Smoker     Years: 15.00     Types: Cigarettes     Smokeless tobacco: Never Used     Tobacco comment: 5 ciggs a day   Substance Use Topics     Alcohol use: Yes     Comment: socially     Alcohol Use 1/10/2019   If you drink alcohol do you typically have greater than 3 drinks per day OR greater than 7 drinks per week? No       Reviewed orders with patient.  Reviewed health maintenance and updated orders accordingly - Yes  BP Readings from Last 3 Encounters:   01/10/19 126/77   01/03/19 120/82   09/13/18 128/80    Wt Readings from Last 3 Encounters:   01/10/19 77.1 kg (170 lb)   01/03/19 78.2 kg (172 lb 6.4 oz)   09/13/18 77.1 kg (170 lb)                  Allergies   Allergen Reactions     No Known Drug Allergies        Mammogram Screening: Patient under age 50, mutual  decision reflected in health maintenance.      Pertinent mammograms are reviewed under the imaging tab.  History of abnormal Pap smear: NO - age 30- 65 PAP every 3 years recommended  PAP / HPV Latest Ref Rng & Units 9/13/2018 2/6/2015 11/28/2011   PAP - NIL NIL NIL   HPV 16 DNA NEG:Negative Negative - -   HPV 18 DNA NEG:Negative Negative - -   OTHER HR HPV NEG:Negative Negative - -     Reviewed and updated as needed this visit by clinical staff         Reviewed and updated as needed this visit by Provider            Review of Systems   Constitutional: Positive for chills. Negative for fever.   HENT: Negative for congestion, ear pain and sore throat.    Eyes: Positive for visual disturbance. Negative for pain.   Respiratory: Negative for cough and shortness of breath.    Cardiovascular: Negative for chest pain, palpitations and peripheral edema.   Gastrointestinal: Positive for diarrhea. Negative for abdominal pain, constipation, heartburn, hematochezia and nausea.   Breasts:  Positive for breast mass. Negative for tenderness and discharge.   Genitourinary: Positive for frequency and urgency. Negative for dysuria, genital sores, hematuria, pelvic pain, vaginal bleeding and vaginal discharge.   Musculoskeletal: Negative for arthralgias, joint swelling and myalgias.   Skin: Negative for rash.   Neurological: Positive for paresthesias. Negative for dizziness, weakness and headaches.   Psychiatric/Behavioral: Negative for mood changes. The patient is not nervous/anxious.      CONSTITUTIONAL: NEGATIVE for fever, chills, change in weight  INTEGUMENTARU/SKIN: NEGATIVE for worrisome rashes, moles or lesions  EYES: NEGATIVE for vision changes or irritation  ENT: NEGATIVE for ear, mouth and throat problems  RESP: NEGATIVE for significant cough or SOB  BREAST: NEGATIVE for masses, tenderness or discharge  CV: NEGATIVE for chest pain, palpitations or peripheral edema  GI: NEGATIVE for nausea, abdominal pain, heartburn, or change  in bowel habits  : NEGATIVE for unusual urinary or vaginal symptoms. Periods are regular.  MUSCULOSKELETAL: NEGATIVE for significant arthralgias or myalgia  NEURO: NEGATIVE for weakness, dizziness or paresthesias  ENDOCRINE: NEGATIVE for temperature intolerance, skin/hair changes  PSYCHIATRIC: NEGATIVE for changes in mood or affect     OBJECTIVE:   There were no vitals taken for this visit.  Physical Exam  GENERAL: healthy, alert and no distress  EYES: Eyes grossly normal to inspection, PERRL and conjunctivae and sclerae normal  HENT: ear canals and TM's normal, nose and mouth without ulcers or lesions  NECK: no adenopathy, no asymmetry, masses, or scars and thyroid normal to palpation  RESP: lungs clear to auscultation - no rales, rhonchi or wheezes  BREAST: 1x2 cm ropey mass at 2:00 on left breast 4 cm from nipple , no  tenderness or nipple discharge and no palpable axillary masses or adenopathy  CV: regular rate and rhythm, normal S1 S2, no S3 or S4, no murmur, click or rub, no peripheral edema and peripheral pulses strong  ABDOMEN: soft, nontender, no hepatosplenomegaly, no masses and bowel sounds normal  MS: no gross musculoskeletal defects noted, no edema  SKIN: no suspicious lesions or rashes  NEURO: Normal strength and tone, mentation intact and speech normal  PSYCH: mentation appears normal, affect normal/bright  LYMPH: no cervical, supraclavicular, axillary, or inguinal adenopathy    Diagnostic Test Results:  none     ASSESSMENT/PLAN:   1. Routine general medical examination at a health care facility      2. Screening for diabetes mellitus    - Comprehensive metabolic panel    3. Screening for thyroid disorder    - TSH with free T4 reflex    4. Screening, lipid    - Lipid panel reflex to direct LDL Fasting    5. Screening for blood disease    - CBC with platelets    6. Lump or mass in breast    - MA Diagnostic Digital Bilateral; Future  - US Breast Left Complete 4 Quadrants; Future    7. Tobacco  "abuse  Risks, benefits, side effects and intended purposes discussed.      - varenicline (CHANTIX GAGAN) 0.5 MG X 11 & 1 MG X 42 tablet; Take 0.5 mg tab daily for 3 days, THEN 0.5 mg tab twice daily for 4 days, THEN 1 mg twice daily.  Dispense: 53 tablet; Refill: 0  - varenicline (CHANTIX) 1 MG tablet; Take 1 tablet (1 mg) by mouth 2 times daily  Dispense: 180 tablet; Refill: 3    COUNSELING:  Reviewed preventive health counseling, as reflected in patient instructions       Regular exercise       Healthy diet/nutrition       Vision screening       Hearing screening       tobacco abuse    BP Readings from Last 1 Encounters:   01/03/19 120/82     Estimated body mass index is 34.82 kg/m  as calculated from the following:    Height as of 1/3/19: 1.499 m (4' 11\").    Weight as of 1/3/19: 78.2 kg (172 lb 6.4 oz).      Weight management plan: Discussed healthy diet and exercise guidelines     reports that she has been smoking cigarettes.  She has smoked for the past 15.00 years. she has never used smokeless tobacco.  Tobacco Cessation Action Plan: Pharmacotherapies : Chantix    Counseling Resources:  ATP IV Guidelines  Pooled Cohorts Equation Calculator  Breast Cancer Risk Calculator  FRAX Risk Assessment  ICSI Preventive Guidelines  Dietary Guidelines for Americans, 2010  USDA's MyPlate  ASA Prophylaxis  Lung CA Screening    Ramona Ann Aaseby-Aguilera, PA-C  [facility]  "

## 2019-01-16 ENCOUNTER — HOSPITAL ENCOUNTER (OUTPATIENT)
Dept: ULTRASOUND IMAGING | Facility: CLINIC | Age: 44
End: 2019-01-16
Attending: PHYSICIAN ASSISTANT
Payer: COMMERCIAL

## 2019-01-16 ENCOUNTER — HOSPITAL ENCOUNTER (OUTPATIENT)
Dept: MAMMOGRAPHY | Facility: CLINIC | Age: 44
Discharge: HOME | End: 2019-01-16
Attending: PHYSICIAN ASSISTANT | Admitting: PHYSICIAN ASSISTANT
Payer: COMMERCIAL

## 2019-01-16 DIAGNOSIS — N63.0 LUMP OR MASS IN BREAST: ICD-10-CM

## 2019-01-16 PROCEDURE — G0279 TOMOSYNTHESIS, MAMMO: HCPCS

## 2019-01-16 PROCEDURE — 76642 ULTRASOUND BREAST LIMITED: CPT | Mod: LT

## 2019-01-16 PROCEDURE — 77066 DX MAMMO INCL CAD BI: CPT

## 2019-01-21 NOTE — RESULT ENCOUNTER NOTE
"Dear Sarahy,    It was a pleasure to see you at your recent visit.      Patient Active Problem List:     Esophageal reflux     Tobacco abuse     Obesity     CARDIOVASCULAR SCREENING; LDL GOAL LESS THAN 160     Anxiety        The results of your recent total cholesterol test were elevated.  Your total cholesterol should be less than 200.    The primary goal of therapy is the LDL or \"bad\" cholesterol.  Your LDL level was elevated.  Your LDL goal based on risk factors (i.e. smoking, family history, high blood pressure, low HDL cholesterol) and age is 160.    Your HDL, or \"good\" cholesterol is abnormal.  Your goal is an HDL level above 40 if you are male and 50 if you are female    Triglycerides are another cholesterol component that is associated with heart disease.  Normal triglycerides are less than 150.  Your   triglyceride level is normal.    I would recommend: increase daily fiber intake, weight loss, start Omega-3 fatty acids, 1000mg 2-3 times daily, increase physical activity with a goal of 150 minutes moderate exercise weekly, decrease saturated fat intake to less than 7% of total calories and repeat fasting lipids and liver tests in 12 months.        The rest of your labs are within acceptable limits :     Results for orders placed or performed in visit on 01/10/19  -Lipid panel reflex to direct LDL Fasting       Result                                            Value                         Ref Range                       Cholesterol                                       240 (H)                       <200 mg/dL                      Triglycerides                                     136                           <150 mg/dL                      HDL Cholesterol                                   33 (L)                        >49 mg/dL                       LDL Cholesterol Calculated                        180 (H)                       <100 mg/dL                      Non HDL Cholesterol                             "   207 (H)                       <130 mg/dL                 -CBC with platelets       Result                                            Value                         Ref Range                       WBC                                               5.7                           4.0 - 11.0 10e9/L               RBC Count                                         4.15                          3.8 - 5.2 10e12/L               Hemoglobin                                        13.9                          11.7 - 15.7 g/dL                Hematocrit                                        40.7                          35.0 - 47.0 %                   MCV                                               98                            78 - 100 fl                     MCH                                               33.5 (H)                      26.5 - 33.0 pg                  MCHC                                              34.2                          31.5 - 36.5 g/dL                RDW                                               11.9                          10.0 - 15.0 %                   Platelet Count                                    300                           150 - 450 10e9/L           -TSH with free T4 reflex       Result                                            Value                         Ref Range                       TSH                                               1.09                          0.40 - 4.00 mU/L           -Comprehensive metabolic panel       Result                                            Value                         Ref Range                       Sodium                                            138                           133 - 144 mmol/L                Potassium                                         4.1                           3.4 - 5.3 mmol/L                Chloride                                          106                           94 - 109 mmol/L                 Carbon Dioxide                                     27                            20 - 32 mmol/L                  Anion Gap                                         5                             3 - 14 mmol/L                   Glucose                                           102 (H)                       70 - 99 mg/dL                   Urea Nitrogen                                     10                            7 - 30 mg/dL                    Creatinine                                        0.57                          0.52 - 1.04 mg/dL               GFR Estimate                                      >90                           >60 mL/min/[1.73_m2]            GFR Estimate If Black                             >90                           >60 mL/min/[1.73_m2]            Calcium                                           9.4                           8.5 - 10.1 mg/dL                Bilirubin Total                                   0.4                           0.2 - 1.3 mg/dL                 Albumin                                           4.0                           3.4 - 5.0 g/dL                  Protein Total                                     7.6                           6.8 - 8.8 g/dL                  Alkaline Phosphatase                              51                            40 - 150 U/L                    ALT                                               38                            0 - 50 U/L                      AST                                               11                            0 - 45 U/L                     Thank you for choosing [facility]. We appreciate the opportunity to serve   you and look forward to supporting your healthcare needs in the future.    If you have any questions or concerns, please contact us at [phone]      Sincerely,        Ramona Aaseby-Aguilera PA-C          TEST DESCRIPTIONS   CBC (Complete Blood Coun  t) includes hemoglobin, hematocrit, white blood  "cells, etc. This test can be used to detect anemia, infection, and abnormalities in blood cells.   Cholesterol is one of the blood fats (lipids) and is the building block used by the body for cell wall and   hormone production. Increased levels of cholesterol have been proven to directly contribute to heart disease and strokes.   Triglycerides are one of the blood fats (lipids) and are thought to be associated with heart disease. If you have had anything to   eat or drink other than water for 12 hours before the test and your level is high, you should have the test repeated after a 12 hour fast. Abnormally high results may also be associated with diabetes, kidney and liver diseases.   LDL is the low density l  ipoprotein component of the cholesterol. It is the harmful substance that deposits cholesterol on the artery walls contributing to heart attacks and strokes. A high LDL level is associated with higher risk of coronary heart disease.   HDL stands for high   density lipoprotein and refers to the so-called \"good\" cholesterol. HDL picks up excess cholesterol in the bloodstream and carries it back to the liver for disposal. Individuals with higher than average HDL seem to have a lower risk of coronary disease.   Vigorous exercise will help increase the blood levels of HDL.   Risk Factor (Total cholesterol/HDL) is a commonly used ratio for cardiac risk assessment. Less than 5.0 for men and 4.4 for women is ideal.   Sodium is an electrolyte useful in diagnosis of   dehydration, diabetes, hypertension, or other diseases involving electrolyte imbalance. It also preserves the balance between calcium and potassium to maintain normal heart action and equilibrium of the body.   Potassium is also an electrolyte that work  s with sodium to regulate the body's water balance and normalize heart rhythm.   Glucose is a measure of blood sugar and is one of the tests for diabetes. If you have not been fasting, your level will " often be high. A low glucose level may be a cause of   weakness or dizziness. Blood sugar ranks with cholesterol as a causative factor in arteriosclerosis and heart attacks.   BUN & Creatinine are waste products excreted by the kidneys. A high BUN can be related to a high protein diet, heavy exercise, fever   and infections, dehydration, kidney stones, and kidney disease. Creatinine elevation is less dependent on diet or exercise and better represents kidney impairment. Low values are not generally significant.   Calcium is a mineral in the blood controlled b  y the parathyroid glands and kidneys. It is important in the formation of bone, in muscle and nerve function, and in blood clotting. Disease of the parathyroid gland, diseased bones or kidneys, or defective absorption of calcium may cause abnormal levels   from the intestine.   ALT, AST, Alk Phos are liver tests. Enzymes found in the liver as well as skeletal and cardiac muscle. Elevations can often be seen in alcoholism, liver or heart disease. Slightly abnormal values are not considered significant.   T  SH stands for Thyroid Stimulating Hormone. A sensitive test used to determine how the thyroid gland is functioning. The thyroid gland produces hormones that control the body's metabolism. When too few hormones are produced (increased TSH), hypothyroidism   occurs which can cause fatigue, sensitivity to cold, and weight gain - an overall slowing down of bodily functions. When too many thyroid hormones are produces (or decreased TSH), it creates a condition call hyperthyroidism (such as Graves' disease) whi  ch causes rapid heartbeat, weight loss, and dizziness, among other symptoms.   PSA stands for Prostate Specific Antigen. Elevated levels of PSA can increase with trauma, infection, inflammation, or disease processes in the prostate such as BPH (Benigh Pr  ostatic Hypertrophy) or cancer.   Glycohemoglobin or HgBA1C is a 3-month average of blood  sugar.

## 2019-03-11 ENCOUNTER — TELEPHONE (OUTPATIENT)
Dept: FAMILY MEDICINE | Facility: CLINIC | Age: 44
End: 2019-03-11

## 2019-03-11 NOTE — TELEPHONE ENCOUNTER
Pt calling stating the lump on the left side feels like it is getting bigger and gets sharp pains with sensitivity on that area. She is wondering about getting another mammo and ultrasound as the last one showed a cyst.    Pt does have an appt this week on Thursday for other issues     Please advise    Lorri Yuan RN, BSN

## 2019-03-14 ENCOUNTER — OFFICE VISIT (OUTPATIENT)
Dept: FAMILY MEDICINE | Facility: CLINIC | Age: 44
End: 2019-03-14
Payer: COMMERCIAL

## 2019-03-14 VITALS
WEIGHT: 173.6 LBS | HEIGHT: 60 IN | OXYGEN SATURATION: 99 % | HEART RATE: 66 BPM | RESPIRATION RATE: 16 BRPM | TEMPERATURE: 99.6 F | BODY MASS INDEX: 34.08 KG/M2 | DIASTOLIC BLOOD PRESSURE: 90 MMHG | SYSTOLIC BLOOD PRESSURE: 140 MMHG

## 2019-03-14 DIAGNOSIS — Z30.432 ENCOUNTER FOR IUD REMOVAL: ICD-10-CM

## 2019-03-14 DIAGNOSIS — E66.811 CLASS 1 OBESITY DUE TO EXCESS CALORIES WITHOUT SERIOUS COMORBIDITY WITH BODY MASS INDEX (BMI) OF 34.0 TO 34.9 IN ADULT: ICD-10-CM

## 2019-03-14 DIAGNOSIS — E66.09 CLASS 1 OBESITY DUE TO EXCESS CALORIES WITHOUT SERIOUS COMORBIDITY WITH BODY MASS INDEX (BMI) OF 34.0 TO 34.9 IN ADULT: ICD-10-CM

## 2019-03-14 DIAGNOSIS — N63.0 LUMP OR MASS IN BREAST: ICD-10-CM

## 2019-03-14 DIAGNOSIS — N64.4 BREAST PAIN: Primary | ICD-10-CM

## 2019-03-14 LAB
FSH SERPL-ACNC: 4.6 IU/L
LH SERPL-ACNC: 2.6 IU/L

## 2019-03-14 PROCEDURE — 83002 ASSAY OF GONADOTROPIN (LH): CPT | Performed by: ADVANCED PRACTICE MIDWIFE

## 2019-03-14 PROCEDURE — 83001 ASSAY OF GONADOTROPIN (FSH): CPT | Performed by: ADVANCED PRACTICE MIDWIFE

## 2019-03-14 PROCEDURE — 99214 OFFICE O/P EST MOD 30 MIN: CPT | Performed by: PHYSICIAN ASSISTANT

## 2019-03-14 PROCEDURE — 36415 COLL VENOUS BLD VENIPUNCTURE: CPT | Performed by: ADVANCED PRACTICE MIDWIFE

## 2019-03-14 RX ORDER — PHENTERMINE HYDROCHLORIDE 15 MG/1
15 CAPSULE ORAL EVERY MORNING
Qty: 30 CAPSULE | Refills: 0 | Status: SHIPPED | OUTPATIENT
Start: 2019-03-14 | End: 2019-04-19

## 2019-03-14 ASSESSMENT — MIFFLIN-ST. JEOR: SCORE: 1349.97

## 2019-03-14 NOTE — PATIENT INSTRUCTIONS
(N64.4) Breast pain  (primary encounter diagnosis)  Comment:   Plan: MA Diagnostic Digital Bilateral, US Breast         Right Complete 4 Quadrants, US Breast Left         Complete 4 Quadrants            (N63.0) Lump or mass in breast  Comment:   Plan: MA Diagnostic Digital Bilateral, US Breast         Right Complete 4 Quadrants, US Breast Left         Complete 4 Quadrants            (E66.09,  Z68.34) Class 1 obesity due to excess calories without serious comorbidity with body mass index (BMI) of 34.0 to 34.9 in adult  Comment: agreed to 1 month trial.    Plan: phentermine (ADIPEX-P) 15 MG capsule

## 2019-03-14 NOTE — PROGRESS NOTES
"  SUBJECTIVE:   Sarahy Meng is a 43 year old female who presents to clinic today for the following health issues:      Pt is here to discuss weight management and to restart phentermine.       She would also like to get a referral for follow to cyst on breast : had an US of left breast in January but now pain is worse in upper outer quad of left breast.  Also has palpable lumps over right breast.         Problem list and histories reviewed & adjusted, as indicated.  Additional history: as documented    Current Outpatient Medications   Medication Sig Dispense Refill     phentermine (ADIPEX-P) 15 MG capsule Take 1 capsule (15 mg) by mouth every morning 30 capsule 0     BP Readings from Last 3 Encounters:   03/14/19 140/90   01/10/19 126/77   01/03/19 120/82    Wt Readings from Last 3 Encounters:   03/14/19 78.7 kg (173 lb 9.6 oz)   01/10/19 77.1 kg (170 lb)   01/03/19 78.2 kg (172 lb 6.4 oz)                    Reviewed and updated as needed this visit by clinical staff  Tobacco  Allergies  Meds  Med Hx  Surg Hx  Fam Hx  Soc Hx      Reviewed and updated as needed this visit by Provider         ROS:  Constitutional, HEENT, cardiovascular, pulmonary, gi and gu systems are negative, except as otherwise noted.    OBJECTIVE:                                                    /90 (BP Location: Right arm, Patient Position: Chair, Cuff Size: Adult Regular)   Pulse 66   Temp 99.6  F (37.6  C) (Oral)   Resp 16   Ht 1.518 m (4' 11.75\")   Wt 78.7 kg (173 lb 9.6 oz)   SpO2 99%   Breastfeeding? No   BMI 34.19 kg/m    Body mass index is 34.19 kg/m .  GENERAL APPEARANCE: healthy, alert and no distress  HENT: ear canals and TM's normal and nose and mouth without ulcers or lesions  RESP: lungs clear to auscultation - no rales, rhonchi or wheezes  BREAST: normal without masses, tenderness or nipple discharge and no palpable axillary masses or adenopathy  CV: regular rates and rhythm, normal S1 S2, no S3 or S4 and " no murmur, click or rub    Diagnostic test results:  Diagnostic Test Results:  none      ASSESSMENT/PLAN:                                                    1. Breast pain  Well get us on both breasts and determine plan when results known   - MA Diagnostic Digital Bilateral; Future  - US Breast Right Complete 4 Quadrants; Future  - US Breast Left Complete 4 Quadrants; Future    2. Lump or mass in breast    - MA Diagnostic Digital Bilateral; Future  - US Breast Right Complete 4 Quadrants; Future  - US Breast Left Complete 4 Quadrants; Future    3. Class 1 obesity due to excess calories without serious comorbidity with body mass index (BMI) of 34.0 to 34.9 in adult    - phentermine (ADIPEX-P) 15 MG capsule; Take 1 capsule (15 mg) by mouth every morning  Dispense: 30 capsule; Refill: 0    4. Encounter for IUD removal    - Lutropin  - Follicle stimulating hormone      There are no Patient Instructions on file for this visit.    Ramona Ann Aaseby-Aguilera, PA-C  [facility]

## 2019-04-19 DIAGNOSIS — E66.09 CLASS 1 OBESITY DUE TO EXCESS CALORIES WITHOUT SERIOUS COMORBIDITY WITH BODY MASS INDEX (BMI) OF 34.0 TO 34.9 IN ADULT: ICD-10-CM

## 2019-04-19 DIAGNOSIS — E66.811 CLASS 1 OBESITY DUE TO EXCESS CALORIES WITHOUT SERIOUS COMORBIDITY WITH BODY MASS INDEX (BMI) OF 34.0 TO 34.9 IN ADULT: ICD-10-CM

## 2019-04-19 RX ORDER — PHENTERMINE HYDROCHLORIDE 15 MG/1
CAPSULE ORAL
Qty: 30 CAPSULE | Refills: 0 | Status: SHIPPED | OUTPATIENT
Start: 2019-04-19 | End: 2019-05-22

## 2019-04-19 NOTE — TELEPHONE ENCOUNTER
Requested Prescriptions   Pending Prescriptions Disp Refills     phentermine (ADIPEX-P) 15 MG capsule [Pharmacy Med Name: Phentermine HCl Oral Capsule 15 MG]  Last Written Prescription Date:  03/14/2019  Last Fill Quantity: 30 capsule,  # refills: 0   Last Office Visit: 3/14/2019 Aaseby-Aguilera  Future Office Visit:      30 capsule 0     Sig: TAKE 1 CAPSULE BY MOUTH EVERY MORNING       There is no refill protocol information for this order

## 2019-04-19 NOTE — TELEPHONE ENCOUNTER
[place] reviewed:  reviewed- no concerns    Last fill 3/21/19    Tamy Borreog RN      [place]  [email or web address]

## 2019-05-22 DIAGNOSIS — E66.09 CLASS 1 OBESITY DUE TO EXCESS CALORIES WITHOUT SERIOUS COMORBIDITY WITH BODY MASS INDEX (BMI) OF 34.0 TO 34.9 IN ADULT: ICD-10-CM

## 2019-05-22 DIAGNOSIS — E66.811 CLASS 1 OBESITY DUE TO EXCESS CALORIES WITHOUT SERIOUS COMORBIDITY WITH BODY MASS INDEX (BMI) OF 34.0 TO 34.9 IN ADULT: ICD-10-CM

## 2019-05-22 NOTE — TELEPHONE ENCOUNTER
Controlled Substance Refill Request for phentermine (ADIPEX-P) 15 MG capsule  Problem List Complete:  No     PROVIDER TO CONSIDER COMPLETION OF PROBLEM LIST AND OVERVIEW/CONTROLLED SUBSTANCE AGREEMENT    Last Written Prescription Date:  04/19/2019  Last Fill Quantity: 30 capsule,   # refills: 0    THE MOST RECENT OFFICE VISIT MUST BE WITHIN THE PAST 3 MONTHS. AT LEAST ONE FACE TO FACE VISIT MUST OCCUR EVERY 6 MONTHS. ADDITIONAL VISITS CAN BE VIRTUAL.  (THIS STATEMENT SHOULD BE DELETED.)    Last Office Visit with [facility] primary care provider: 03/14/2019    Future Office visit:     Controlled substance agreement:   Encounter-Level CSA:    There are no encounter-level csa.     Patient-Level CSA:    There are no patient-level csa.         Last Urine Drug Screen: No results found for: CDAUT, No results found for: COMDAT, No results found for: THC13, PCP13, COC13, MAMP13, OPI13, AMP13, BZO13, TCA13, MTD13, BAR13, OXY13, PPX13, BUP13     Processing:  Fax Rx to [id number] [place], [place] - [address]     [email or web address]       checked in past 3 months? Yes  04/19/2019      Samuel RAMOS

## 2019-05-23 RX ORDER — PHENTERMINE HYDROCHLORIDE 15 MG/1
CAPSULE ORAL
Qty: 30 CAPSULE | Refills: 0 | Status: SHIPPED | OUTPATIENT
Start: 2019-05-23 | End: 2019-06-27

## 2019-06-27 DIAGNOSIS — E66.811 CLASS 1 OBESITY DUE TO EXCESS CALORIES WITHOUT SERIOUS COMORBIDITY WITH BODY MASS INDEX (BMI) OF 34.0 TO 34.9 IN ADULT: ICD-10-CM

## 2019-06-27 DIAGNOSIS — E66.09 CLASS 1 OBESITY DUE TO EXCESS CALORIES WITHOUT SERIOUS COMORBIDITY WITH BODY MASS INDEX (BMI) OF 34.0 TO 34.9 IN ADULT: ICD-10-CM

## 2019-06-27 RX ORDER — PHENTERMINE HYDROCHLORIDE 15 MG/1
CAPSULE ORAL
Qty: 30 CAPSULE | Refills: 1 | Status: SHIPPED | OUTPATIENT
Start: 2019-06-27 | End: 2022-03-25

## 2019-06-27 NOTE — TELEPHONE ENCOUNTER
Controlled Substance Refill Request for phentermine (ADIPEX-P) 15 MG capsule  Problem List Complete:  No     PROVIDER TO CONSIDER COMPLETION OF PROBLEM LIST AND OVERVIEW/CONTROLLED SUBSTANCE AGREEMENT    Last Written Prescription Date:  05/23/2019  Last Fill Quantity: 30 capsule,   # refills: 0    THE MOST RECENT OFFICE VISIT MUST BE WITHIN THE PAST 3 MONTHS. AT LEAST ONE FACE TO FACE VISIT MUST OCCUR EVERY 6 MONTHS. ADDITIONAL VISITS CAN BE VIRTUAL.  (THIS STATEMENT SHOULD BE DELETED.)    [place] [facility] [place]: [place]/14/2019    Future Office visit:     Controlled substance agreement:   Encounter-Level CSA:    There are no encounter-level csa.     Patient-Level CSA:    There are no patient-level csa.         Last Urine Drug Screen: No results found for: CDAUT, No results found for: COMDAT, No results found for: THC13, PCP13, COC13, MAMP13, OPI13, AMP13, BZO13, TCA13, MTD13, BAR13, OXY13, PPX13, BUP13     Processing:  Fax Rx to [id number] [place], [place] - [address]     [email or web address]       checked in past 3 months?  Yes 04/19/2019      Samuel RAMOS

## 2019-08-01 ENCOUNTER — OFFICE VISIT (OUTPATIENT)
Dept: OBGYN | Facility: CLINIC | Age: 44
End: 2019-08-01
Payer: COMMERCIAL

## 2019-08-01 VITALS
WEIGHT: 158.9 LBS | SYSTOLIC BLOOD PRESSURE: 142 MMHG | BODY MASS INDEX: 31.29 KG/M2 | DIASTOLIC BLOOD PRESSURE: 102 MMHG

## 2019-08-01 DIAGNOSIS — Z30.013 ENCOUNTER FOR INITIAL PRESCRIPTION OF INJECTABLE CONTRACEPTIVE: Primary | ICD-10-CM

## 2019-08-01 PROCEDURE — 99213 OFFICE O/P EST LOW 20 MIN: CPT | Mod: 25 | Performed by: ADVANCED PRACTICE MIDWIFE

## 2019-08-01 PROCEDURE — 96372 THER/PROPH/DIAG INJ SC/IM: CPT | Performed by: ADVANCED PRACTICE MIDWIFE

## 2019-08-01 RX ORDER — MEDROXYPROGESTERONE ACETATE 150 MG/ML
150 INJECTION, SUSPENSION INTRAMUSCULAR
Status: DISCONTINUED | OUTPATIENT
Start: 2019-08-01 | End: 2024-02-14

## 2019-08-01 RX ADMIN — MEDROXYPROGESTERONE ACETATE 150 MG: 150 INJECTION, SUSPENSION INTRAMUSCULAR at 10:35

## 2019-08-01 NOTE — NURSING NOTE
"Chief Complaint   Patient presents with     Vaginal Problem     Heavy menses       Initial BP (!) 142/102 (BP Location: Right arm, Patient Position: Chair, Cuff Size: Adult Regular)   Wt 72.1 kg (158 lb 14.4 oz)   LMP 2019 (Approximate)   Breastfeeding? No   BMI 31.29 kg/m   Estimated body mass index is 31.29 kg/m  as calculated from the following:    Height as of 3/14/19: 1.518 m (4' 11.75\").    Weight as of this encounter: 72.1 kg (158 lb 14.4 oz).  BP completed using cuff size: regular    Questioned patient about current smoking habits.  Pt. currently smokes.  Advised about smoking cessation.          The following HM Due: NONE      Caroline Rocha CMA on 2019 at 10:04 AM       Clinic Administered Medication Documentation      Depo Provera Documentation    Prior to injection, verified patient identity using patient's name and date of birth. Medication was administered. Please see MAR and medication order for additional information. Patient instructed to remain in clinic for 15 minutes.    BP: 142/102    LAST PAP/EXAM:   Lab Results   Component Value Date    PAP NIL 2018     URINE HCG:not indicated    NEXT INJECTION DUE: 10/17/19 - 10/31/19    Was entire vial of medication used? Yes  Vial/Syringe: Single dose vial  Expiration Date:  2020      Caroline Rocha CMA on 2019 at 10:38 AM  "

## 2019-08-01 NOTE — PROGRESS NOTES
SUBJECTIVE:   Sarahy Meng is a 43 year old who presents to the clinic for discussion of birth control methods.   She has used the following methods in the past: Mirena IUD  Today she is interested in discussing Depo Provera.  Patient presents reporting heavy cycles with cramping. She used to use Mirena. She had her Mirena removed in January and I was unable to pass a new one through her cervix at that visit. She attempted to go off contraception to see if her cycles would improve but they did not. She is over 45, smokes cigarettes, and has elevated BP. Not a candidate for estrogen therapy.  Histories reviewed and updated  Past Medical History:   Diagnosis Date     Contact dermatitis and other eczema, due to unspecified cause      Depressive disorder, not elsewhere classified      Esophageal reflux      Past Surgical History:   Procedure Laterality Date     C NONSPECIFIC PROCEDURE       - boy 89     C NONSPECIFIC PROCEDURE      R. [employer] store      Social History     Socioeconomic History     Marital status:      Spouse name: Not on file     Number of children: Not on file     Years of education: Not on file     Highest education level: Not on file   Occupational History     Not on file   Social Needs     Financial resource strain: Not on file     Food insecurity:     Worry: Not on file     Inability: Not on file     Transportation needs:     Medical: Not on file     Non-medical: Not on file   Tobacco Use     Smoking status: Current Every Day Smoker     Years: 15.00     Types: Cigarettes     Smokeless tobacco: Never Used     Tobacco comment: 5 ciggs a day   Substance and Sexual Activity     Alcohol use: Yes     Comment: socially     Drug use: Not Currently     Types: Marijuana     Sexual activity: Yes     Partners: Male     Birth control/protection: Male Surgical   Lifestyle     Physical activity:     Days per week: Not on file     Minutes per session: Not on file     Stress: Not on file    Relationships     Social connections:     Talks on phone: Not on file     Gets together: Not on file     Attends Episcopal service: Not on file     Active member of club or organization: Not on file     Attends meetings of clubs or organizations: Not on file     Relationship status: Not on file     Intimate partner violence:     Fear of current or ex partner: Not on file     Emotionally abused: Not on file     Physically abused: Not on file     Forced sexual activity: Not on file   Other Topics Concern     Parent/sibling w/ CABG, MI or angioplasty before 65F 55M? Yes     Comment: grandpa age 52   Social History Narrative    , has 2 sone age 12 and 16 and 2 stepsons nicolas and winifred, works at [employer]     Family History   Problem Relation Age of Onset     Heart Disease Maternal Grandfather      Diabetes Maternal Grandfather      Cerebrovascular Disease Maternal Grandfather      Arthritis Maternal Grandfather      Cancer Maternal Grandfather         lung     Arthritis Mother      Hypertension Mother      Thyroid Disease Mother      Clotting Disorder Mother         blood     Colitis Mother      Colon Polyps Father      Breast Cancer No family hx of      Cancer - colorectal No family hx of        Menstrual History:  Menses every 28 days.  Length of menses: 8 days  Menstrual description: crampy and heavy    Denies the following contraindications to estrogen/progesterone combined contraception:  Migraine with aura  Liver disease  Personal history of blood clot or stroke   History of heart disease  History of breast cancer  Undiagnosed vaginal bleeding  Pregnancy    Health maintenance updated:  yes    ROS:   12 point review of systems negative other than symptoms noted below.    EXAM:  BP (!) 142/102 (BP Location: Right arm, Patient Position: Chair, Cuff Size: Adult Regular)   Wt 72.1 kg (158 lb 14.4 oz)   LMP 07/08/2019 (Approximate)   Breastfeeding? No   BMI 31.29 kg/m    Body mass index is 31.29 kg/m .    Physical Exam   Constitutional: She is oriented to person, place, and time. She appears well-developed and well-nourished.   Pulmonary/Chest: Effort normal.   Neurological: She is alert and oriented to person, place, and time.   Skin: Skin is warm and dry.   Psychiatric: She has a normal mood and affect. Her behavior is normal. Judgment and thought content normal.         ASSESSMENT/PLAN:    ICD-10-CM    1. Encounter for initial prescription of injectable contraceptive Z30.013 medroxyPROGESTERone (DEPO-PROVERA) injection 150 mg     There are no contraindications to the use of Depo Provera    COUNSELING:  Reviewed risks and benefits of contraceptive use of depo provera. Educated on use/MOA/SE/risk. Patient verbalizes understanding and consents to depo use. Advised patient of osteoporosis risk; patient agrees to take calcium supplement. First shot given today, will return in 3 months for next shot.   Discussed proper use of chosen method  Handouts/Instrucions provided    Lazara Stratton CNM

## 2019-10-15 ENCOUNTER — ALLIED HEALTH/NURSE VISIT (OUTPATIENT)
Dept: NURSING | Facility: CLINIC | Age: 44
End: 2019-10-15
Payer: COMMERCIAL

## 2019-10-15 DIAGNOSIS — Z30.42 DEPO-PROVERA CONTRACEPTIVE STATUS: Primary | ICD-10-CM

## 2019-10-15 PROCEDURE — 96372 THER/PROPH/DIAG INJ SC/IM: CPT

## 2019-10-15 RX ADMIN — MEDROXYPROGESTERONE ACETATE 150 MG: 150 INJECTION, SUSPENSION INTRAMUSCULAR at 11:49

## 2019-10-15 NOTE — NURSING NOTE
Clinic Administered Medication Documentation    MEDICATION LIST:   Depo Provera Documentation    Prior to injection, verified patient identity using patient's name and date of birth. Medication was administered. Please see MAR and medication order for additional information. Patient instructed to remain in clinic for 15 minutes.    BP: Data Unavailable    LAST PAP/EXAM:   Lab Results   Component Value Date    PAP NIL 09/13/2018     URINE HCG:not indicated    NEXT INJECTION DUE: 12/31/19 - 1/14/20    Was entire vial of medication used? Yes  Vial/Syringe: Single dose vial  Expiration Date:  06/2020    TOMMY Olsen

## 2020-01-14 ENCOUNTER — ALLIED HEALTH/NURSE VISIT (OUTPATIENT)
Dept: NURSING | Facility: CLINIC | Age: 45
End: 2020-01-14
Payer: COMMERCIAL

## 2020-01-14 ENCOUNTER — TELEPHONE (OUTPATIENT)
Dept: FAMILY MEDICINE | Facility: CLINIC | Age: 45
End: 2020-01-14

## 2020-01-14 DIAGNOSIS — Z30.42 DEPO-PROVERA CONTRACEPTIVE STATUS: Primary | ICD-10-CM

## 2020-01-14 PROCEDURE — 96372 THER/PROPH/DIAG INJ SC/IM: CPT

## 2020-01-14 RX ADMIN — MEDROXYPROGESTERONE ACETATE 150 MG: 150 INJECTION, SUSPENSION INTRAMUSCULAR at 15:20

## 2020-01-14 NOTE — TELEPHONE ENCOUNTER
Pt calling to schedule her depo injection which today is the last day.  She is having some cramping, come clots and wondering if this is normal. She has been on the depo for almost a year.  Advised this can happen and to get her depo today with a scheduled follow up as the providers are booking out a ways.  Pt agrees and scheduled    Lorri Yuan RN, BSN

## 2020-01-14 NOTE — PROGRESS NOTES
BP: Data Unavailable    LAST PAP/EXAM:   Lab Results   Component Value Date    PAP NIL 09/13/2018     URINE HCG:not indicated    The following medication was given:     MEDICATION: Depo Provera 150mg  ROUTE: IM  SITE: Deltoid - Right  : Pat  LOT #: [id number]  EXP:07/1/20  NEXT INJECTION DUE: 3/31/20 - 4/14/20   Provider: Ramona Aaseby Aguilera     Next 3/31-4/13

## 2020-03-10 ENCOUNTER — TELEPHONE (OUTPATIENT)
Dept: FAMILY MEDICINE | Facility: CLINIC | Age: 45
End: 2020-03-10

## 2020-03-10 ENCOUNTER — HOSPITAL ENCOUNTER (OUTPATIENT)
Dept: MAMMOGRAPHY | Facility: CLINIC | Age: 45
End: 2020-03-10
Attending: PHYSICIAN ASSISTANT
Payer: COMMERCIAL

## 2020-03-10 ENCOUNTER — OFFICE VISIT (OUTPATIENT)
Dept: OBGYN | Facility: CLINIC | Age: 45
End: 2020-03-10
Payer: COMMERCIAL

## 2020-03-10 VITALS
DIASTOLIC BLOOD PRESSURE: 80 MMHG | BODY MASS INDEX: 32.59 KG/M2 | HEIGHT: 60 IN | WEIGHT: 166 LBS | SYSTOLIC BLOOD PRESSURE: 120 MMHG

## 2020-03-10 DIAGNOSIS — R92.8 ABNORMAL MAMMOGRAM: Primary | ICD-10-CM

## 2020-03-10 DIAGNOSIS — Z12.31 VISIT FOR SCREENING MAMMOGRAM: ICD-10-CM

## 2020-03-10 DIAGNOSIS — N93.9 ABNORMAL UTERINE BLEEDING (AUB): Primary | ICD-10-CM

## 2020-03-10 DIAGNOSIS — Z30.430 ENCOUNTER FOR INSERTION OF INTRAUTERINE CONTRACEPTIVE DEVICE: ICD-10-CM

## 2020-03-10 PROCEDURE — 58300 INSERT INTRAUTERINE DEVICE: CPT | Performed by: OBSTETRICS & GYNECOLOGY

## 2020-03-10 PROCEDURE — 88305 TISSUE EXAM BY PATHOLOGIST: CPT | Performed by: OBSTETRICS & GYNECOLOGY

## 2020-03-10 PROCEDURE — 99214 OFFICE O/P EST MOD 30 MIN: CPT | Mod: 25 | Performed by: OBSTETRICS & GYNECOLOGY

## 2020-03-10 PROCEDURE — 58100 BIOPSY OF UTERUS LINING: CPT | Performed by: OBSTETRICS & GYNECOLOGY

## 2020-03-10 ASSESSMENT — MIFFLIN-ST. JEOR: SCORE: 1310.5

## 2020-03-10 NOTE — NURSING NOTE
"Chief Complaint   Patient presents with     Abnormal Uterine Bleeding       Mirena inserted  Verbal consent  Lot # [id number]  Exp:   NDC 78176-04-52  Lesly Walls CMA      Initial /80 (BP Location: Right arm, Patient Position: Chair, Cuff Size: Adult Regular)   Ht 1.518 m (4' 11.75\")   Wt 75.3 kg (166 lb)   LMP 2019 (Exact Date)   Breastfeeding No   BMI 32.69 kg/m   Estimated body mass index is 32.69 kg/m  as calculated from the following:    Height as of this encounter: 1.518 m (4' 11.75\").    Weight as of this encounter: 75.3 kg (166 lb).  BP completed using cuff size: regular    Questioned patient about current smoking habits.  Pt. has never smoked.          The following HM Due: NONE      Lesly Walls CMA    "

## 2020-03-10 NOTE — PROGRESS NOTES
"  SUBJECTIVE:                                                   CC:  Patient presents with:  Abnormal Uterine Bleeding      HPI:  Sarahy Meng is a 44 year old  who presents with abnormal bleeding.  Had Mirena IUD for years, amenorrheic on it.  When it  she tried to have it replaced but was too crampy.  Has been on depo provera.  In December started spotting, initially not enough to need a pad, sometimes red and osmetimes brown, and over the last couple of months it has become heavier.  Heavy bleeding over the last 10 days, with clotting as big as quarters multiple times per day.   +cramping that makes her not want to leave the house.     H/o CS x2  Vasectomy for birth control  Up to date on pap smears  fam history of fibroids and thyroid abnormalities    ROS: 10 point ROS negative other than as listed above in HPI.    Gyn History:  Patient's last menstrual period was 2019 (exact date).     Patient is sexually active.  Using vasectomy and depo for contraception.      Last 3 Pap and HPV Results:   PAP / HPV Latest Ref Rng & Units 2011   PAP - NIL NIL NIL   HPV 16 DNA NEG:Negative Negative - -   HPV 18 DNA NEG:Negative Negative - -   OTHER HR HPV NEG:Negative Negative - -       PMH, PSH, Soc Hx, Fam Hx, Meds, and allergies reviewed in Epic.  Denies family history of gyn cancer - mom w benign fibroids    OBJECTIVE:     /80 (BP Location: Right arm, Patient Position: Chair, Cuff Size: Adult Regular)   Ht 1.518 m (4' 11.75\")   Wt 75.3 kg (166 lb)   LMP 2019 (Exact Date)   Breastfeeding No   BMI 32.69 kg/m      Gen: Healthy appearing obese female, no acute distress, comfortable  HENT: No scleral injection or icterus  CV: Regular rate  Resp: Normal work of breathing, no cough  GI: Abdomen soft, non-tender. No masses, organomegaly  Skin: No suspicious lesions or rashes  Psychiatric: mentation appears normal and affect bright    : Normal external female " genitalia.  No external lesions, normal hair distribution.   SSE: Speculum exam reveals vaginal epithelium well rugated with bloody menstrual discharge. Cervix appears smooth, pink, with no visible lesions.   Bimanual exam reveals slightly enlarged uterus, non-tender, and mobile - although exam limited by body habitus. No adnexal masses or tenderness. No cervical motion tenderness.     Endometrial biopsy procedure note  3/10/2020     INDICATIONS:                                                    Is a pregnancy test required: No.  Was a consent obtained?  Yes    Having endometrial biopsy for abnormal uterine bleeding    PROCEDURE;                                                      A speculum was placed in the vagina and cervix prepped with betadine. A tenaculum was not attached to the cervix. A small plastic 5 mm Pipelle syringe curette was inserted into the cervical canal. The uterus was sounded to 8 cm's. A vigorous four quadrant biopsy was performed, removing amount large of tissue. The speculum was removed. This tissue was placed in Formalin and sent to pathology.    The patient tolerated the procedure  well and she reported there was no cramping.      POST PROCEDURE;                                                      There  was no cramping at the time of discharge. She  tolerated the procedure well. There were no complications. Patient was discharged in stable condition.    Patient advised to call the clinic if severe pelvic pain, fever or heavy bleeding.    Yasmine Crabtree MD      IUD Insertion Procedure Note  3/10/2020     The patient was counseled on the risks, benefits, and alternatives of the procedure. Verbal and written consent were obtained.    The patient was placed in the dorsal lithotomy position.  A bimanual exam revealed an anteverted uterus.  A speculum was inserted without difficulty. The cervix was cleaned with betadine. The uterus was then sounded to 8cm using the endometrial pipelle. A  Mirena IUD was inserted in a sterile fashion and placed in the uterus with a 3cm tail. The patient tolerated the procedure with no complications.     The patient was instructed to return to clinic in three to four weeks to check the length of the strings if she could not feel them herself at home. Also instructed to call with symptoms of infection such as a fever, heavy bleeding, or severe pain not controlled with over the counter medication. She was advised to use ibuprofen as needed for mild to moderate pain.  She was counseled that the IUD does not protect against STIs and that she will need to have a new device placed in 5 years.  All pt questions were answered.      Yasmine Crabtree MD       ASSESSMENT/PLAN:                                                      1. Abnormal uterine bleeding (AUB)  Discussed etiologies for abnormal uterine bleeding; including polyps, fibroids, adenomyosis, endometrial cancer or pre-cancer, hormonal imbalances related to menopause, coagulopathy, or medication side effect. Recommend endometrial biopsy to rule out malignancy. In her case, the most probable cause is hormonal imbalances related to perimenopause however her mom does have thyroid issues and fibroids. Discussed methods of treatment of heavy uterine bleeding including birth control pills, Mirena IUD, nexplanon, endometrial ablation, or hysterectomy. My recommendation would be Mirena IUD due to the ability to place it in clinic, its reversability, and also it will not obscure future detection of endometrial cancer cells (as can be the case with ablation). It also has high rates of amenorrhea and is likely to treat her symptoms very effectively. She elected for Mirena IUD placed concurrently with endometrial biopsy. See procedure note above.  If not controlling her symptoms effectively, consider ablation or hysterectomy but at this time would see what happens with the IUD over 3-4  Months.   - US Pelvic Complete with  Transvaginal; Future  - **TSH with free T4 reflex FUTURE anytime; Future  - **CBC with platelets FUTURE 1yr; Future  - Surgical pathology exam  - ENDOMETRIAL BIOPSY W/O CERVICAL DILATION    2. Encounter for insertion of intrauterine contraceptive device  - levonorgestrel (MIRENA) 20 MCG/24HR IUD; 1 each (20 mcg) by Intrauterine route once  Dispense:    - levonorgestrel (MIRENA) 20 MCG/24HR IUD 20 mcg  - INSERTION INTRAUTERINE DEVICE     Yasmine Crabtree MD, MPH  Obstetrics and Gynecology

## 2020-03-10 NOTE — TELEPHONE ENCOUNTER
[facility] called patient abnormal mammogram screening and now needs dx bilateral mammogram and left breast US. Please place orders and sign for the breast center.  Nicole Lyons

## 2020-03-11 DIAGNOSIS — N93.9 ABNORMAL UTERINE BLEEDING (AUB): ICD-10-CM

## 2020-03-11 LAB
ERYTHROCYTE [DISTWIDTH] IN BLOOD BY AUTOMATED COUNT: 12.2 % (ref 10–15)
HCT VFR BLD AUTO: 39.4 % (ref 35–47)
HGB BLD-MCNC: 13.2 G/DL (ref 11.7–15.7)
MCH RBC QN AUTO: 32.8 PG (ref 26.5–33)
MCHC RBC AUTO-ENTMCNC: 33.5 G/DL (ref 31.5–36.5)
MCV RBC AUTO: 98 FL (ref 78–100)
PLATELET # BLD AUTO: 311 10E9/L (ref 150–450)
RBC # BLD AUTO: 4.02 10E12/L (ref 3.8–5.2)
WBC # BLD AUTO: 7.7 10E9/L (ref 4–11)

## 2020-03-11 PROCEDURE — 36415 COLL VENOUS BLD VENIPUNCTURE: CPT | Performed by: OBSTETRICS & GYNECOLOGY

## 2020-03-11 PROCEDURE — 84443 ASSAY THYROID STIM HORMONE: CPT | Performed by: OBSTETRICS & GYNECOLOGY

## 2020-03-11 PROCEDURE — 85027 COMPLETE CBC AUTOMATED: CPT | Performed by: OBSTETRICS & GYNECOLOGY

## 2020-03-12 ENCOUNTER — ANCILLARY PROCEDURE (OUTPATIENT)
Dept: ULTRASOUND IMAGING | Facility: CLINIC | Age: 45
End: 2020-03-12
Attending: OBSTETRICS & GYNECOLOGY
Payer: COMMERCIAL

## 2020-03-12 DIAGNOSIS — N93.9 ABNORMAL UTERINE BLEEDING (AUB): ICD-10-CM

## 2020-03-12 LAB
COPATH REPORT: NORMAL
TSH SERPL DL<=0.005 MIU/L-ACNC: 2.49 MU/L (ref 0.4–4)

## 2020-03-12 PROCEDURE — 76856 US EXAM PELVIC COMPLETE: CPT | Performed by: OBSTETRICS & GYNECOLOGY

## 2020-03-12 PROCEDURE — 76830 TRANSVAGINAL US NON-OB: CPT | Performed by: OBSTETRICS & GYNECOLOGY

## 2020-03-15 ENCOUNTER — HEALTH MAINTENANCE LETTER (OUTPATIENT)
Age: 45
End: 2020-03-15

## 2021-01-14 ENCOUNTER — HEALTH MAINTENANCE LETTER (OUTPATIENT)
Age: 46
End: 2021-01-14

## 2021-02-09 NOTE — PROGRESS NOTES
Pre-Visit Planning     Appointment Notes for this encounter:   phetermine refill for weight loss    Questionnaires Reviewed/Assigned  No additional questionnaires are needed        Patient preferred phone number: [phone]

## 2021-02-10 ENCOUNTER — VIRTUAL VISIT (OUTPATIENT)
Dept: FAMILY MEDICINE | Facility: CLINIC | Age: 46
End: 2021-02-10
Payer: COMMERCIAL

## 2021-02-10 DIAGNOSIS — E66.811 CLASS 1 OBESITY DUE TO EXCESS CALORIES WITHOUT SERIOUS COMORBIDITY IN ADULT, UNSPECIFIED BMI: ICD-10-CM

## 2021-02-10 DIAGNOSIS — Z11.59 NEED FOR HEPATITIS C SCREENING TEST: Primary | ICD-10-CM

## 2021-02-10 DIAGNOSIS — Z13.220 SCREENING, LIPID: ICD-10-CM

## 2021-02-10 DIAGNOSIS — Z13.29 SCREENING FOR THYROID DISORDER: ICD-10-CM

## 2021-02-10 DIAGNOSIS — Z13.0 SCREENING FOR BLOOD DISEASE: ICD-10-CM

## 2021-02-10 DIAGNOSIS — R53.83 FATIGUE, UNSPECIFIED TYPE: ICD-10-CM

## 2021-02-10 DIAGNOSIS — Z13.1 SCREENING FOR DIABETES MELLITUS: ICD-10-CM

## 2021-02-10 DIAGNOSIS — E66.09 CLASS 1 OBESITY DUE TO EXCESS CALORIES WITHOUT SERIOUS COMORBIDITY IN ADULT, UNSPECIFIED BMI: ICD-10-CM

## 2021-02-10 PROCEDURE — 99213 OFFICE O/P EST LOW 20 MIN: CPT | Mod: GT | Performed by: PHYSICIAN ASSISTANT

## 2021-02-10 RX ORDER — PHENTERMINE HYDROCHLORIDE 37.5 MG/1
37.5 TABLET ORAL
Qty: 30 TABLET | Refills: 2 | Status: SHIPPED | OUTPATIENT
Start: 2021-02-10 | End: 2021-04-29

## 2021-02-10 NOTE — PROGRESS NOTES
"Sarahy is a 45 year old who is being evaluated via a billable video visit.      How would you like to obtain your AVS? MyChart  If the video visit is dropped, the invitation should be resent by: Text to cell phone: [phone]  Will anyone else be joining your video visit? No      Video Start Time: 12:54 PM    Assessment & Plan     Class 1 obesity due to excess calories without serious comorbidity in adult, unspecified BMI    - phentermine (ADIPEX-P) 37.5 MG tablet; Take 1 tablet (37.5 mg) by mouth every morning (before breakfast)    Fatigue, unspecified type    - TSH with free T4 reflex; Future  - CBC with platelets; Future  - Comprehensive metabolic panel; Future    Need for hepatitis C screening test    - **Hepatitis C Screen Reflex to RNA FUTURE anytime; Future    Screening for blood disease    - CBC with platelets; Future    Screening for diabetes mellitus    - Comprehensive metabolic panel; Future    Screening, lipid    - Lipid panel reflex to direct LDL Fasting; Future    Screening for thyroid disorder    - TSH with free T4 reflex; Future           Tobacco Cessation:   reports that she has been smoking cigarettes. She has smoked for the past 15.00 years. She has never used smokeless tobacco.  Tobacco Cessation Action Plan: Information offered: Patient not interested at this time    BMI:   Estimated body mass index is 32.69 kg/m  as calculated from the following:    Height as of 3/10/20: 1.518 m (4' 11.75\").    Weight as of 3/10/20: 75.3 kg (166 lb).   Weight management plan: Discussed healthy diet and exercise guidelines    FUTURE LABS:       - Schedule a fasting blood draw     No follow-ups on file.    Ramona Ann Aaseby-Aguilera, PA-C  [facility]    Kvng Weathers is a 45 year old who presents for the following health issues     HPI       CONCERNS - REFILL REQUEST  Pt being seen today to get back on phentermine for weight loss.    Started Keto and taking meal replacements and " "eating lots of salads and veges and has lost 5 lds.  Today weighs 171     Has taken phentermine in the past and helpful     Also, she has additional complaints of   .   feels very tired and fatigued all the time.  Has iud and menses are better controlled now. Has thyroid disease in family and would like to be tested.       Review of Systems   Constitutional, HEENT, cardiovascular, pulmonary, gi and gu systems are negative, except as otherwise noted.      Objective    Vitals - Patient Reported  Weight (Patient Reported): 77.6 kg (171 lb)  Height (Patient Reported): 151.8 cm (4' 11.75\")  BMI (Based on Pt Reported Ht/Wt): 33.68      Vitals:  No vitals were obtained today due to virtual visit.    Physical Exam   GENERAL: Healthy, alert and no distress  EYES: Eyes grossly normal to inspection.  No discharge or erythema, or obvious scleral/conjunctival abnormalities.  RESP: No audible wheeze, cough, or visible cyanosis.  No visible retractions or increased work of breathing.    SKIN: Visible skin clear. No significant rash, abnormal pigmentation or lesions.  NEURO: Cranial nerves grossly intact.  Mentation and speech appropriate for age.  PSYCH: Mentation appears normal, affect normal/bright, judgement and insight intact, normal speech and appearance well-groomed.          Video-Visit Details    Type of service:  Video Visit    Video End Time:1:07 PM    Originating Location (pt. Location): Home    Distant Location (provider location):  [facility]     Platform used for Video Visit: Prachi  "

## 2021-03-14 ENCOUNTER — HEALTH MAINTENANCE LETTER (OUTPATIENT)
Age: 46
End: 2021-03-14

## 2021-04-11 DIAGNOSIS — Z13.220 SCREENING, LIPID: ICD-10-CM

## 2021-04-11 DIAGNOSIS — Z13.29 SCREENING FOR THYROID DISORDER: ICD-10-CM

## 2021-04-11 DIAGNOSIS — R53.83 FATIGUE, UNSPECIFIED TYPE: ICD-10-CM

## 2021-04-11 DIAGNOSIS — Z13.0 SCREENING FOR BLOOD DISEASE: ICD-10-CM

## 2021-04-11 DIAGNOSIS — Z13.1 SCREENING FOR DIABETES MELLITUS: ICD-10-CM

## 2021-04-11 DIAGNOSIS — Z11.59 NEED FOR HEPATITIS C SCREENING TEST: ICD-10-CM

## 2021-04-11 LAB
ALBUMIN SERPL-MCNC: 4.1 G/DL (ref 3.4–5)
ALP SERPL-CCNC: 57 U/L (ref 40–150)
ALT SERPL W P-5'-P-CCNC: 49 U/L (ref 0–50)
ANION GAP SERPL CALCULATED.3IONS-SCNC: 4 MMOL/L (ref 3–14)
AST SERPL W P-5'-P-CCNC: 16 U/L (ref 0–45)
BILIRUB SERPL-MCNC: 0.4 MG/DL (ref 0.2–1.3)
BUN SERPL-MCNC: 11 MG/DL (ref 7–30)
CALCIUM SERPL-MCNC: 8.8 MG/DL (ref 8.5–10.1)
CHLORIDE SERPL-SCNC: 107 MMOL/L (ref 94–109)
CHOLEST SERPL-MCNC: 258 MG/DL
CO2 SERPL-SCNC: 26 MMOL/L (ref 20–32)
CREAT SERPL-MCNC: 0.56 MG/DL (ref 0.52–1.04)
ERYTHROCYTE [DISTWIDTH] IN BLOOD BY AUTOMATED COUNT: 12.6 % (ref 10–15)
GFR SERPL CREATININE-BSD FRML MDRD: >90 ML/MIN/{1.73_M2}
GLUCOSE SERPL-MCNC: 92 MG/DL (ref 70–99)
HCT VFR BLD AUTO: 43.1 % (ref 35–47)
HDLC SERPL-MCNC: 37 MG/DL
HGB BLD-MCNC: 14.5 G/DL (ref 11.7–15.7)
LDLC SERPL CALC-MCNC: 193 MG/DL
MCH RBC QN AUTO: 33.3 PG (ref 26.5–33)
MCHC RBC AUTO-ENTMCNC: 33.6 G/DL (ref 31.5–36.5)
MCV RBC AUTO: 99 FL (ref 78–100)
NONHDLC SERPL-MCNC: 221 MG/DL
PLATELET # BLD AUTO: 331 10E9/L (ref 150–450)
POTASSIUM SERPL-SCNC: 4.3 MMOL/L (ref 3.4–5.3)
PROT SERPL-MCNC: 7.6 G/DL (ref 6.8–8.8)
RBC # BLD AUTO: 4.35 10E12/L (ref 3.8–5.2)
SODIUM SERPL-SCNC: 137 MMOL/L (ref 133–144)
TRIGL SERPL-MCNC: 142 MG/DL
TSH SERPL DL<=0.005 MIU/L-ACNC: 0.92 MU/L (ref 0.4–4)
WBC # BLD AUTO: 7.5 10E9/L (ref 4–11)

## 2021-04-11 PROCEDURE — 86803 HEPATITIS C AB TEST: CPT | Performed by: PHYSICIAN ASSISTANT

## 2021-04-11 PROCEDURE — 80053 COMPREHEN METABOLIC PANEL: CPT | Performed by: PHYSICIAN ASSISTANT

## 2021-04-11 PROCEDURE — 85027 COMPLETE CBC AUTOMATED: CPT | Performed by: PHYSICIAN ASSISTANT

## 2021-04-11 PROCEDURE — 80061 LIPID PANEL: CPT | Performed by: PHYSICIAN ASSISTANT

## 2021-04-11 PROCEDURE — 84443 ASSAY THYROID STIM HORMONE: CPT | Performed by: PHYSICIAN ASSISTANT

## 2021-04-11 PROCEDURE — 36415 COLL VENOUS BLD VENIPUNCTURE: CPT | Performed by: PHYSICIAN ASSISTANT

## 2021-04-12 LAB — HCV AB SERPL QL IA: NONREACTIVE

## 2021-04-29 DIAGNOSIS — E66.09 CLASS 1 OBESITY DUE TO EXCESS CALORIES WITHOUT SERIOUS COMORBIDITY IN ADULT, UNSPECIFIED BMI: ICD-10-CM

## 2021-04-29 DIAGNOSIS — E66.811 CLASS 1 OBESITY DUE TO EXCESS CALORIES WITHOUT SERIOUS COMORBIDITY IN ADULT, UNSPECIFIED BMI: ICD-10-CM

## 2021-04-29 RX ORDER — PHENTERMINE HYDROCHLORIDE 37.5 MG/1
TABLET ORAL
Qty: 30 TABLET | Refills: 0 | Status: SHIPPED | OUTPATIENT
Start: 2021-04-29 | End: 2021-05-25

## 2021-05-09 ENCOUNTER — HEALTH MAINTENANCE LETTER (OUTPATIENT)
Age: 46
End: 2021-05-09

## 2021-05-25 ENCOUNTER — MYC REFILL (OUTPATIENT)
Dept: FAMILY MEDICINE | Facility: CLINIC | Age: 46
End: 2021-05-25

## 2021-05-25 DIAGNOSIS — E66.811 CLASS 1 OBESITY DUE TO EXCESS CALORIES WITHOUT SERIOUS COMORBIDITY IN ADULT, UNSPECIFIED BMI: ICD-10-CM

## 2021-05-25 DIAGNOSIS — E66.09 CLASS 1 OBESITY DUE TO EXCESS CALORIES WITHOUT SERIOUS COMORBIDITY IN ADULT, UNSPECIFIED BMI: ICD-10-CM

## 2021-05-25 RX ORDER — PHENTERMINE HYDROCHLORIDE 37.5 MG/1
37.5 TABLET ORAL
Qty: 30 TABLET | Refills: 0 | Status: SHIPPED | OUTPATIENT
Start: 2021-05-25 | End: 2021-07-13

## 2021-05-25 NOTE — TELEPHONE ENCOUNTER
Routing refill request to provider for review/approval because:  Drug not on the [facility] refill protocol     Padmini Villegas RN   [facility] -- Triage Nurse

## 2021-07-13 ENCOUNTER — MYC REFILL (OUTPATIENT)
Dept: FAMILY MEDICINE | Facility: CLINIC | Age: 46
End: 2021-07-13

## 2021-07-13 DIAGNOSIS — E66.811 CLASS 1 OBESITY DUE TO EXCESS CALORIES WITHOUT SERIOUS COMORBIDITY IN ADULT, UNSPECIFIED BMI: ICD-10-CM

## 2021-07-13 DIAGNOSIS — E66.09 CLASS 1 OBESITY DUE TO EXCESS CALORIES WITHOUT SERIOUS COMORBIDITY IN ADULT, UNSPECIFIED BMI: ICD-10-CM

## 2021-07-14 RX ORDER — PHENTERMINE HYDROCHLORIDE 37.5 MG/1
37.5 TABLET ORAL
Qty: 30 TABLET | Refills: 0 | Status: SHIPPED | OUTPATIENT
Start: 2021-07-14 | End: 2022-04-22

## 2021-09-03 ENCOUNTER — MYC REFILL (OUTPATIENT)
Dept: FAMILY MEDICINE | Facility: CLINIC | Age: 46
End: 2021-09-03

## 2021-09-03 DIAGNOSIS — E66.09 CLASS 1 OBESITY DUE TO EXCESS CALORIES WITHOUT SERIOUS COMORBIDITY IN ADULT, UNSPECIFIED BMI: ICD-10-CM

## 2021-09-03 DIAGNOSIS — E66.811 CLASS 1 OBESITY DUE TO EXCESS CALORIES WITHOUT SERIOUS COMORBIDITY IN ADULT, UNSPECIFIED BMI: ICD-10-CM

## 2021-09-03 RX ORDER — PHENTERMINE HYDROCHLORIDE 37.5 MG/1
37.5 TABLET ORAL
Qty: 30 TABLET | Refills: 0 | OUTPATIENT
Start: 2021-09-03

## 2021-09-03 NOTE — TELEPHONE ENCOUNTER
Routing refill request to provider for review/approval because:  Drug not on the [facility] refill protocol     Padmini Villegas RN   [facility]  -- Triage Nurse

## 2021-10-11 ENCOUNTER — IMMUNIZATION (OUTPATIENT)
Dept: NURSING | Facility: CLINIC | Age: 46
End: 2021-10-11
Payer: COMMERCIAL

## 2021-10-11 PROCEDURE — 91301 PR COVID VAC MODERNA 100 MCG/0.5 ML IM: CPT

## 2021-10-11 PROCEDURE — 0011A PR COVID VAC MODERNA 100 MCG/0.5 ML IM: CPT

## 2021-10-24 ENCOUNTER — HEALTH MAINTENANCE LETTER (OUTPATIENT)
Age: 46
End: 2021-10-24

## 2021-11-08 ENCOUNTER — IMMUNIZATION (OUTPATIENT)
Dept: NURSING | Facility: CLINIC | Age: 46
End: 2021-11-08
Attending: INTERNAL MEDICINE
Payer: COMMERCIAL

## 2021-11-08 PROCEDURE — 91301 PR COVID VAC MODERNA 100 MCG/0.5 ML IM: CPT

## 2021-11-08 PROCEDURE — 0012A PR COVID VAC MODERNA 100 MCG/0.5 ML IM: CPT

## 2022-03-25 ENCOUNTER — VIRTUAL VISIT (OUTPATIENT)
Dept: FAMILY MEDICINE | Facility: CLINIC | Age: 47
End: 2022-03-25
Payer: COMMERCIAL

## 2022-03-25 DIAGNOSIS — Z13.220 SCREENING, LIPID: ICD-10-CM

## 2022-03-25 DIAGNOSIS — Z13.1 SCREENING FOR DIABETES MELLITUS: ICD-10-CM

## 2022-03-25 DIAGNOSIS — Z13.29 SCREENING FOR THYROID DISORDER: ICD-10-CM

## 2022-03-25 DIAGNOSIS — Z12.31 VISIT FOR SCREENING MAMMOGRAM: ICD-10-CM

## 2022-03-25 DIAGNOSIS — E78.00 HIGH CHOLESTEROL: ICD-10-CM

## 2022-03-25 DIAGNOSIS — Z13.0 SCREENING FOR BLOOD DISEASE: ICD-10-CM

## 2022-03-25 DIAGNOSIS — M54.50 ACUTE BILATERAL LOW BACK PAIN WITHOUT SCIATICA: Primary | ICD-10-CM

## 2022-03-25 PROCEDURE — 99213 OFFICE O/P EST LOW 20 MIN: CPT | Mod: 95 | Performed by: PHYSICIAN ASSISTANT

## 2022-03-25 RX ORDER — CYCLOBENZAPRINE HCL 10 MG
10 TABLET ORAL 3 TIMES DAILY PRN
Qty: 30 TABLET | Refills: 1 | Status: SHIPPED | OUTPATIENT
Start: 2022-03-25 | End: 2022-06-03

## 2022-03-25 NOTE — PROGRESS NOTES
Sarahy is a 46 year old who is being evaluated via a billable video visit.      How would you like to obtain your AVS? MyChart  If the video visit is dropped, the invitation should be resent by: Text to cell phone: [phone]  Will anyone else be joining your video visit? No      Video Start Time: 8:29 AM    Assessment & Plan     Acute bilateral low back pain without sciatica  Advised follow-up with spine . referll given.  Advised flexeril at night.    - cyclobenzaprine (FLEXERIL) 10 MG tablet; Take 1 tablet (10 mg) by mouth 3 times daily as needed for muscle spasms  - Spine Referral; Future    High cholesterol  ldl was close to 200 last year. Recommend we get labs againg and follow-up for physical to discuss results.     Visit for screening mammogram    - MA SCREENING DIGITAL BILAT - Future  (s+30); Future    Screening for diabetes mellitus    - Comprehensive metabolic panel; Future    Screening, lipid    - Lipid Profile; Future    Screening for thyroid disorder    - TSH with free T4 reflex; Future    Screening for blood disease    - CBC with platelets; Future    [id number]}     Tobacco Cessation:   reports that she has been smoking cigarettes. She has smoked for the past 15.00 years. She has never used smokeless tobacco.          Return in about 4 weeks (around 4/22/2022) for Physical Exam.    Ramona Ann Aaseby-Aguilera, PA-C  [facility]    Kvng Weathers is a 46 year old who presents for the following health issues     History of Present Illness       She eats 2-3 servings of fruits and vegetables daily.She consumes 0 sweetened beverage(s) daily.        Would like to discuss her lab results from last year: cholsteral was very high and she did not follow-up to discuss this.       Also is seeing a chiro and had xrays done and reports a curvature to her spine and facet syndrome.  She is having a lot of low back pain.           Review of Systems   Constitutional, HEENT, cardiovascular,  "pulmonary, gi and gu systems are negative, except as otherwise noted.      Objective    Vitals - Patient Reported  Weight (Patient Reported): 73.5 kg (162 lb)  Height (Patient Reported): 151.8 cm (4' 11.75\")  BMI (Based on Pt Reported Ht/Wt): 31.9        Physical Exam   GENERAL: Healthy, alert and no distress  EYES: Eyes grossly normal to inspection.  No discharge or erythema, or obvious scleral/conjunctival abnormalities.  RESP: No audible wheeze, cough, or visible cyanosis.  No visible retractions or increased work of breathing.    SKIN: Visible skin clear. No significant rash, abnormal pigmentation or lesions.  NEURO: Cranial nerves grossly intact.  Mentation and speech appropriate for age.  PSYCH: Mentation appears normal, affect normal/bright, judgement and insight intact, normal speech and appearance well-groomed.        Video-Visit Details    Type of service:  Video Visit    Video End Time:8:48 AM    Originating Location (pt. Location): Home    Distant Location (provider location):  [facility]     Platform used for Video Visit: Tatum"

## 2022-03-29 ENCOUNTER — OFFICE VISIT (OUTPATIENT)
Dept: NEUROSURGERY | Facility: CLINIC | Age: 47
End: 2022-03-29
Attending: PHYSICIAN ASSISTANT
Payer: COMMERCIAL

## 2022-03-29 VITALS
OXYGEN SATURATION: 98 % | WEIGHT: 166 LBS | HEART RATE: 71 BPM | DIASTOLIC BLOOD PRESSURE: 96 MMHG | HEIGHT: 60 IN | SYSTOLIC BLOOD PRESSURE: 162 MMHG | BODY MASS INDEX: 32.59 KG/M2

## 2022-03-29 DIAGNOSIS — M54.31 RIGHT SIDED SCIATICA: Primary | ICD-10-CM

## 2022-03-29 DIAGNOSIS — M54.50 ACUTE BILATERAL LOW BACK PAIN WITHOUT SCIATICA: ICD-10-CM

## 2022-03-29 PROCEDURE — 99203 OFFICE O/P NEW LOW 30 MIN: CPT | Performed by: PHYSICIAN ASSISTANT

## 2022-03-29 PROCEDURE — G0463 HOSPITAL OUTPT CLINIC VISIT: HCPCS

## 2022-03-29 RX ORDER — METHYLPREDNISOLONE 4 MG
TABLET, DOSE PACK ORAL
Qty: 21 TABLET | Refills: 0 | Status: SHIPPED | OUTPATIENT
Start: 2022-03-29 | End: 2022-04-22

## 2022-03-29 ASSESSMENT — PAIN SCALES - GENERAL: PAINLEVEL: MODERATE PAIN (4)

## 2022-03-29 NOTE — LETTER
3/29/2022         RE: Sarahy Meng  [address]  [facility] [address]        Dear Colleague,    Thank you for referring your patient, aSrahy Meng, to the [facility] NEUROSURGERY [facility]IN[facility] [place]. Please see a copy of my visit note below.    NEUROSURGERY CLINIC CONSULT NOTE     DATE OF VISIT: 3/29/2022     SUBJECTIVE:     Sarahy Meng is a pleasant 46 year old female who presents to the clinic today for consultation on low back pain and right sided radiculopathy. She is referred to the Neurosurgery Clinic by Dr. Aasey-Aguilera in Primary Care. Pertinent medical history consists of chronic low back pain for many years with new acute worsening.     Today, she reports a 8-week history of symptoms. She describes constant, sharp, aching, burning pain that initiates in the low lumbar region and radiates distally into right buttock hip, lateral and anterior thigh stopping at knee. This pain is not accompanied by paresthesia, numbness and/or perceived weakness in the same distribution. Prolonged walking, standing and bending/twisting aggravate the symptoms, while alleviation is obtained by resting, medications, icing. No mechanism of injury such as trauma or a fall is associated with the onset of the pain although Sarahy notes she did try a boxing class 3 weeks ago which seemed to aggravate symptoms. There are no bowel or bladder changes. She denies saddle anesthesia. She denies changes in gait, instability, or falling episodes. She has been participating in PT 3 times weekly and they have diagnosed her with facet syndrome and have been treating her for this. She notes since starting physical therapy she has noticed symptoms down her RLE have improved slightly but low back pain continues.     She has participated in conservative therapies to include physical therapy three times weekly, chiropractic care, NSAIDs, muscle relaxant, icing. None of these modalities have provided any  "significant long term relief.       Current Outpatient Medications:      methylPREDNISolone (MEDROL DOSEPAK) 4 MG tablet therapy pack, Follow Package Directions, Disp: 21 tablet, Rfl: 0     CHANTIX STARTING MONTH GAGAN 0.5 MG X 11 & 1 MG X 42 tablet, , Disp: , Rfl: 0     cyclobenzaprine (FLEXERIL) 10 MG tablet, Take 1 tablet (10 mg) by mouth 3 times daily as needed for muscle spasms, Disp: 30 tablet, Rfl: 1     levonorgestrel (MIRENA) 20 MCG/24HR IUD, 1 each (20 mcg) by Intrauterine route once, Disp:  , Rfl:      phentermine (ADIPEX-P) 37.5 MG tablet, Take 1 tablet (37.5 mg) by mouth every morning (before breakfast), Disp: 30 tablet, Rfl: 0    Current Facility-Administered Medications:      medroxyPROGESTERone (DEPO-PROVERA) injection 150 mg, 150 mg, Intramuscular, Q90 Days, Lazara Stratton, KAYE CNM, 150 mg at 20 1520     Allergies   Allergen Reactions     No Known Drug Allergies         Past Medical History:   Diagnosis Date     Contact dermatitis and other eczema, due to unspecified cause      Depressive disorder, not elsewhere classified      Esophageal reflux         ROS: 10 point review of symptoms are negative other than the symptoms noted above in the HPI.     Family History has been reviewed with the patient, there are no pertinent findings to presenting concern.     Past Surgical History:   Procedure Laterality Date     [facility] NONSPECIFIC PROCEDURE       - boy 89     [facility] NONSPECIFIC PROCEDURE      [facility] [employer]         Social History     Tobacco Use     Smoking status: Current Every Day Smoker     Years: 15.00     Types: Cigarettes     Smokeless tobacco: Never Used     Tobacco comment: 6 ciggs a day   Vaping Use     Vaping Use: Never used   Substance Use Topics     Alcohol use: Yes     Comment: socially     Drug use: Not Currently     Types: Marijuana        OBJECTIVE:   BP (!) 162/96   Pulse 71   Ht 4' 11.75\" (1.518 m)   Wt 166 lb (75.3 kg)   SpO2 98%   BMI 32.69 kg/m   "   Body mass index is 32.69 kg/m .         Exam:   CN II-XII grossly intact, alert and appropriate with conversation and following commands.   Gait is non-antalgic.  Able to walk on toes and heels without difficulty.   Cervical spine is non tender to palpation.   Bilateral bicep 2/4 and tricep reflexes 1/4. Sensation intact throughout upper extremities.     UE muscle strength  Right  Left    Deltoid  5/5  5/5    Biceps  5/5  5/5    Triceps  5/5  5/5    Hand intrinsics  5/5  5/5    Hand grasp  5/5  5/5           Lumbar spine is tender to palpation upper and mid spinous processes and throughout paraspinous muscles bilaterally  Intact sensation throughout lower extremities.   Bilateral patellar 2/4 and achilles reflex 1/4.    LE muscle strength  Right  Left    Iliopsoas (hip flexion)  5/5, pain limiting 5/5, pain limiting   Quad (knee extension)  5/5  5/5    Hamstring (knee flexion)  5/5  5/5    Gastrocnemius (PF)  5/5  5/5    Tibialis Ant. (DF)  5/5  5/5    EHL  5/5  5/5      Negative for clonus.   Calves are soft and non-tender bilaterally.     ASSESSMENT/PLAN:     Sarahy Meng is a pleasant 46 year old female who presents to the clinic today for consultation on low back pain and right sided radiculopathy. She is referred to the Neurosurgery Clinic by Dr. Aasey-Aguilera in Primary Care. Pertinent medical history consists of chronic low back pain for many years with new acute worsening. On exam, she is noted to have appropriate strength, sensation and range of motion. She has attempted conservative management with PT, medciations, icing without resolution of symptoms.     Based on her physical exam, we do feel that it would be in her best interest to obtain a lumbar MRI to further assess. Once the images have been obtained she has agreed to call our office back at [phone] to further discuss possible surgical interventions or other conservative therapies.      Sent Medrol Dosepak to pharmacy.    In the meantime,  "recommend continuing conservative approach with medications and physical therapy.    We will call Sarahy once MRI is complete with results and next steps. In the event that patient's symptoms worsen or change we would like to see her sooner. We also discussed signs of a worsening problem that she should seek being evaluated.        Respectfully,     Carolina CARLOS [facility] Neurosurgery  [address]  [address] [place] [address]    Tel [phone]      Sarahy Meng is a 46 year old female who presents for:  Chief Complaint   Patient presents with     Consult     Acute bilateral LBP w/ sciatica         Initial Vitals:  BP (!) 162/96   Pulse 71   Ht 4' 11.75\" (1.518 m)   Wt 166 lb (75.3 kg)   SpO2 98%   BMI 32.69 kg/m   Estimated body mass index is 32.69 kg/m  as calculated from the following:    Height as of this encounter: 4' 11.75\" (1.518 m).    Weight as of this encounter: 166 lb (75.3 kg).. Body surface area is 1.78 meters squared. BP completed using cuff size: regular  Moderate Pain (4)    Nursing Comments:     Manisha Collins MA        Again, thank you for allowing me to participate in the care of your patient.        Sincerely,        Carolina Stephens PA-C    "

## 2022-03-29 NOTE — PROGRESS NOTES
"Sarahy Meng is a 46 year old female who presents for:  Chief Complaint   Patient presents with     Consult     Acute bilateral LBP w/ sciatica         Initial Vitals:  BP (!) 162/96   Pulse 71   Ht 4' 11.75\" (1.518 m)   Wt 166 lb (75.3 kg)   SpO2 98%   BMI 32.69 kg/m   Estimated body mass index is 32.69 kg/m  as calculated from the following:    Height as of this encounter: 4' 11.75\" (1.518 m).    Weight as of this encounter: 166 lb (75.3 kg).. Body surface area is 1.78 meters squared. BP completed using cuff size: regular  Moderate Pain (4)    Nursing Comments:     Manisha Collins MA    "

## 2022-03-29 NOTE — PATIENT INSTRUCTIONS
-Lumbar MRI w/o contrast- you will need to call to schedule    Call [place] radiology scheduling for your procedure:  For scheduling in the [facility] ([facility]) call [phone]  or  [phone]      -Allow 3-5 business days for results, if not contact our office and ask to speak to Carolina regarding MRI results   -Medrol Dosepak sent to pharmacy- take as instructed    Carolina CARLOS [facility] Neurosurgery  [address] [address]    Tel [phone]

## 2022-03-29 NOTE — PROGRESS NOTES
NEUROSURGERY CLINIC CONSULT NOTE     DATE OF VISIT: 3/29/2022     SUBJECTIVE:     Sarahy Meng is a pleasant 46 year old female who presents to the clinic today for consultation on low back pain and right sided radiculopathy. She is referred to the Neurosurgery Clinic by Dr. Aasey-Aguilera in Primary Care. Pertinent medical history consists of chronic low back pain for many years with new acute worsening.     Today, she reports a 8-week history of symptoms. She describes constant, sharp, aching, burning pain that initiates in the low lumbar region and radiates distally into right buttock hip, lateral and anterior thigh stopping at knee. This pain is not accompanied by paresthesia, numbness and/or perceived weakness in the same distribution. Prolonged walking, standing and bending/twisting aggravate the symptoms, while alleviation is obtained by resting, medications, icing. No mechanism of injury such as trauma or a fall is associated with the onset of the pain although Sarahy notes she did try a boxing class 3 weeks ago which seemed to aggravate symptoms. There are no bowel or bladder changes. She denies saddle anesthesia. She denies changes in gait, instability, or falling episodes. She has been participating in PT 3 times weekly and they have diagnosed her with facet syndrome and have been treating her for this. She notes since starting physical therapy she has noticed symptoms down her RLE have improved slightly but low back pain continues.     She has participated in conservative therapies to include physical therapy three times weekly, chiropractic care, NSAIDs, muscle relaxant, icing. None of these modalities have provided any significant long term relief.       Current Outpatient Medications:      methylPREDNISolone (MEDROL DOSEPAK) 4 MG tablet therapy pack, Follow Package Directions, Disp: 21 tablet, Rfl: 0     CHANTIX STARTING MONTH GAGAN 0.5 MG X 11 & 1 MG X 42 tablet, , Disp: , Rfl: 0     cyclobenzaprine  "(FLEXERIL) 10 MG tablet, Take 1 tablet (10 mg) by mouth 3 times daily as needed for muscle spasms, Disp: 30 tablet, Rfl: 1     levonorgestrel (MIRENA) 20 MCG/24HR IUD, 1 each (20 mcg) by Intrauterine route once, Disp:  , Rfl:      phentermine (ADIPEX-P) 37.5 MG tablet, Take 1 tablet (37.5 mg) by mouth every morning (before breakfast), Disp: 30 tablet, Rfl: 0    Current Facility-Administered Medications:      medroxyPROGESTERone (DEPO-PROVERA) injection 150 mg, 150 mg, Intramuscular, Q90 Days, Lazara Stratton APRN CNM, 150 mg at 20 1520     Allergies   Allergen Reactions     No Known Drug Allergies         Past Medical History:   Diagnosis Date     Contact dermatitis and other eczema, due to unspecified cause      Depressive disorder, not elsewhere classified      Esophageal reflux         ROS: 10 point review of symptoms are negative other than the symptoms noted above in the HPI.     Family History has been reviewed with the patient, there are no pertinent findings to presenting concern.     Past Surgical History:   Procedure Laterality Date     [facility] NONSPECIFIC PROCEDURE       - boy 89     [facility] NONSPECIFIC PROCEDURE      [employer]         Social History     Tobacco Use     Smoking status: Current Every Day Smoker     Years: 15.00     Types: Cigarettes     Smokeless tobacco: Never Used     Tobacco comment: 6 ciggs a day   Vaping Use     Vaping Use: Never used   Substance Use Topics     Alcohol use: Yes     Comment: socially     Drug use: Not Currently     Types: Marijuana        OBJECTIVE:   BP (!) 162/96   Pulse 71   Ht 4' 11.75\" (1.518 m)   Wt 166 lb (75.3 kg)   SpO2 98%   BMI 32.69 kg/m     Body mass index is 32.69 kg/m .         Exam:   CN II-XII grossly intact, alert and appropriate with conversation and following commands.   Gait is non-antalgic.  Able to walk on toes and heels without difficulty.   Cervical spine is non tender to palpation.   Bilateral bicep 2/4 and " tricep reflexes 1/4. Sensation intact throughout upper extremities.     UE muscle strength  Right  Left    Deltoid  5/5  5/5    Biceps  5/5  5/5    Triceps  5/5  5/5    Hand intrinsics  5/5  5/5    Hand grasp  5/5  5/5           Lumbar spine is tender to palpation upper and mid spinous processes and throughout paraspinous muscles bilaterally  Intact sensation throughout lower extremities.   Bilateral patellar 2/4 and achilles reflex 1/4.    LE muscle strength  Right  Left    Iliopsoas (hip flexion)  5/5, pain limiting 5/5, pain limiting   Quad (knee extension)  5/5  5/5    Hamstring (knee flexion)  5/5  5/5    Gastrocnemius (PF)  5/5  5/5    Tibialis Ant. (DF)  5/5  5/5    EHL  5/5  5/5      Negative for clonus.   Calves are soft and non-tender bilaterally.     ASSESSMENT/PLAN:     Sarahy Meng is a pleasant 46 year old female who presents to the clinic today for consultation on low back pain and right sided radiculopathy. She is referred to the Neurosurgery Clinic by Dr. Aasey-Aguilera in Primary Care. Pertinent medical history consists of chronic low back pain for many years with new acute worsening. On exam, she is noted to have appropriate strength, sensation and range of motion. She has attempted conservative management with PT, medciations, icing without resolution of symptoms.     Based on her physical exam, we do feel that it would be in her best interest to obtain a lumbar MRI to further assess. Once the images have been obtained she has agreed to call our office back at [phone] to further discuss possible surgical interventions or other conservative therapies.      Sent Medrol Dosepak to pharmacy.    In the meantime, recommend continuing conservative approach with medications and physical therapy.    We will call Sarahy once MRI is complete with results and next steps. In the event that patient's symptoms worsen or change we would like to see her sooner. We also discussed signs of a worsening  problem that she should seek being evaluated.        Respectfully,     Carolina CARLOS [facility] Neurosurgery  [address] [place] [address]    Tel [phone]

## 2022-03-31 ENCOUNTER — HOSPITAL ENCOUNTER (OUTPATIENT)
Dept: MRI IMAGING | Facility: CLINIC | Age: 47
Discharge: HOME | End: 2022-03-31
Attending: PHYSICIAN ASSISTANT | Admitting: PHYSICIAN ASSISTANT
Payer: COMMERCIAL

## 2022-03-31 DIAGNOSIS — M54.31 RIGHT SIDED SCIATICA: ICD-10-CM

## 2022-03-31 PROCEDURE — 72148 MRI LUMBAR SPINE W/O DYE: CPT

## 2022-04-04 ENCOUNTER — TELEPHONE (OUTPATIENT)
Dept: FAMILY MEDICINE | Facility: CLINIC | Age: 47
End: 2022-04-04
Payer: COMMERCIAL

## 2022-04-04 NOTE — TELEPHONE ENCOUNTER
[facility] calls (chiropractor), [phone], calling for medical record, looking for MRI result, they faxed form to [phone] on 3/29 and waiting, discussed, might be faster if pt prints result off of [facility], will also route message to  care team, informed caller that may have been sent to medical records for processing and may need more time, FYI sent to care team, inform them if different  Beryl Powers RN, BSN  [facility]

## 2022-04-06 ENCOUNTER — TELEPHONE (OUTPATIENT)
Dept: NEUROSURGERY | Facility: CLINIC | Age: 47
End: 2022-04-06
Payer: COMMERCIAL

## 2022-04-06 NOTE — TELEPHONE ENCOUNTER
----- Message from Carolina Stephens PA-C sent at 2022  1:06 PM CDT -----  Regarding: FW: peer to peer review  Can we please schedule this   Thanks   ----- Message -----  From: Kiara Huffman  Sent: 2022  10:29 AM CDT  To: Carolina Stephens PA-C  Subject: peer to peer review                              Peer to Peer Request    Patient Name:  Sarahy Meng  :    1975  MRN:    [id number]        Dr. Stephens,    The authorization for procedure (description) MRI lumbar spine on date of service 2022 has been denied. We were unsuccessful in obtaining approval through clinical review. A [id number]-pewr review can be done by calling the insurance/third party authorization vendor with the following information:    Insurance:[employer]  Auth Vendor: [employer]  Phone:  [phone]  Due:  asap    Clinicals already Provided  Order:    yes  Visit Notes:   yes  Results:   N/A  Other:    N/A    Patient ID: [id number]  Case/Ref #:[id number]    Patient contact: Yes  Patient response:    Patient Phone #:     Denial Reason: Imaging requires six weeks of provider directed treatment to be completed. Supported treatments   include (but are not limited to) drugs for swelling or pain, an in office workout (physical   therapy), and/or oral or injected steroids. This must have been completed in the past three   months without improved symptoms. Contact (via office visit, phone, email, or messaging) must   occur after the treatment is completed. This has not been met because:  You have not completed six weeks of provider directed treatment.  The provider directed treatment did not occur within the last three months.  Symptoms must be the same or worse after treatment to support imaging.  There was no contact with your provider after completing treatment.   Medical necessity is defined as a need for particular services or supplies that a provider   exercising prudent clinical judgment, would provide to a patient for the  purpose of preventing,   evaluating, diagnosing or treating an illness, injury, disease or its symptoms and that are:  ? in accordance with generally accepted standards of medical practice;  ? clinically appropriate, in terms of type, frequency, extent, site and duration, and   considered effective for the patient's illness, injury or disease; and  ? not primarily for the convenience of the patient, physician, or other health care provider,   and not more costly than an alternative service or sequence of services at least as likely to   produce equivalent therapeutic or diagnostic results as to the diagnosis or treatment of   that patient's illness, injury or disease.  As stated in your plan document, your coverage provides benefits for only those covered   services, drugs, and supplies that are medically necessary and appropriate for the diagnosis or   treatment of a specific illness, injury, or condition. No benefits will be provided unless it is   determined that the service or supply is medically necessary and appropriate.      If you feel you have additional clinical information that could get this denial overturned, please complete the Peer to Peer.  If you choose not to do the P2P, please let me know as soon as possible so that we can reach out to the patient and advise them of their denial.      Thank you,    Nneka RODRIGUEZ   [facility]

## 2022-04-06 NOTE — TELEPHONE ENCOUNTER
Called Estela to discuss P2P, advised that a fax with notes to support reconsideration can be sent to [phone]. Advised nurse staff and Carolina for follow up. Also advised this must be completed by 4/17/22.

## 2022-04-07 ENCOUNTER — TELEPHONE (OUTPATIENT)
Dept: NEUROSURGERY | Facility: CLINIC | Age: 47
End: 2022-04-07
Payer: COMMERCIAL

## 2022-04-07 NOTE — TELEPHONE ENCOUNTER
Per [id number] scheduling staff, we were advised by [facility] that faxed notes to support case for reconsideration should be sent to [phone] no later than 22. Information needed is below.    Cover letter must include:  Attention: Reconsideration - Sarahy Meng,  1975, case number [id number]                             Peer to Peer Request    Patient Name:  Sarahy Meng  :    1975  MRN:    [id number]      Dr. Stephens,    The authorization for procedure (description) MRI lumbar spine on date of service 2022 has been denied. We were unsuccessful in obtaining approval through clinical review. A ekac-jj-exut review can be done by calling the insurance/third party authorization vendor with the following information:    Insurance:[employer]  Auth Vendor: [employer]  Phone:  [phone]  Due:  asap    Clinicals already Provided  Order:    yes  Visit Notes:   yes  Results:   N/A  Other:    N/A    Patient ID: [id number]  Case/Ref #:[id number]    Patient contact: Yes  Patient response:    Patient Phone #:     Denial Reason: Imaging requires six weeks of provider directed treatment to be completed. Supported treatments   include (but are not limited to) drugs for swelling or pain, an in office workout (physical   therapy), and/or oral or injected steroids. This must have been completed in the past three   months without improved symptoms. Contact (via office visit, phone, email, or messaging) must   occur after the treatment is completed. This has not been met because:  You have not completed six weeks of provider directed treatment.  The provider directed treatment did not occur within the last three months.  Symptoms must be the same or worse after treatment to support imaging.  There was no contact with your provider after completing treatment.   Medical necessity is defined as a need for particular services or supplies that a provider   exercising prudent clinical judgment, would provide to a  patient for the purpose of preventing,   evaluating, diagnosing or treating an illness, injury, disease or its symptoms and that are:  ? in accordance with generally accepted standards of medical practice;  ? clinically appropriate, in terms of type, frequency, extent, site and duration, and   considered effective for the patient's illness, injury or disease; and  ? not primarily for the convenience of the patient, physician, or other health care provider,   and not more costly than an alternative service or sequence of services at least as likely to   produce equivalent therapeutic or diagnostic results as to the diagnosis or treatment of   that patient's illness, injury or disease.  As stated in your plan document, your coverage provides benefits for only those covered   services, drugs, and supplies that are medically necessary and appropriate for the diagnosis or   treatment of a specific illness, injury, or condition. No benefits will be provided unless it is   determined that the service or supply is medically necessary and appropriate.      If you feel you have additional clinical information that could get this denial overturned, please complete the Peer to Peer.  If you choose not to do the P2P, please let me know as soon as possible so that we can reach out to the patient and advise them of their denial.      Thank you,    [facility]

## 2022-04-07 NOTE — TELEPHONE ENCOUNTER
Faxed office visit note detailing that denial specifications have been met April 7, 2022 to fax number [phone] ([employer])    Right Fax confirmed at 11:01 AM    Sarahy Henry RN    Message sent to our [id number]  with update.

## 2022-04-10 ENCOUNTER — HEALTH MAINTENANCE LETTER (OUTPATIENT)
Age: 47
End: 2022-04-10

## 2022-04-12 ENCOUNTER — HOSPITAL ENCOUNTER (OUTPATIENT)
Dept: MAMMOGRAPHY | Facility: CLINIC | Age: 47
Discharge: HOME | End: 2022-04-12
Attending: PHYSICIAN ASSISTANT | Admitting: PHYSICIAN ASSISTANT
Payer: COMMERCIAL

## 2022-04-12 DIAGNOSIS — Z12.31 VISIT FOR SCREENING MAMMOGRAM: ICD-10-CM

## 2022-04-12 PROCEDURE — 77067 SCR MAMMO BI INCL CAD: CPT

## 2022-04-18 ENCOUNTER — LAB (OUTPATIENT)
Dept: LAB | Facility: CLINIC | Age: 47
End: 2022-04-18
Payer: COMMERCIAL

## 2022-04-18 DIAGNOSIS — Z13.0 SCREENING FOR BLOOD DISEASE: ICD-10-CM

## 2022-04-18 DIAGNOSIS — Z13.220 SCREENING, LIPID: ICD-10-CM

## 2022-04-18 DIAGNOSIS — Z13.29 SCREENING FOR THYROID DISORDER: ICD-10-CM

## 2022-04-18 DIAGNOSIS — Z13.1 SCREENING FOR DIABETES MELLITUS: ICD-10-CM

## 2022-04-18 LAB
ALBUMIN SERPL-MCNC: 3.5 G/DL (ref 3.4–5)
ALP SERPL-CCNC: 52 U/L (ref 40–150)
ALT SERPL W P-5'-P-CCNC: 29 U/L (ref 0–50)
ANION GAP SERPL CALCULATED.3IONS-SCNC: 3 MMOL/L (ref 3–14)
AST SERPL W P-5'-P-CCNC: 12 U/L (ref 0–45)
BILIRUB SERPL-MCNC: 0.4 MG/DL (ref 0.2–1.3)
BUN SERPL-MCNC: 12 MG/DL (ref 7–30)
CALCIUM SERPL-MCNC: 8.9 MG/DL (ref 8.5–10.1)
CHLORIDE BLD-SCNC: 106 MMOL/L (ref 94–109)
CHOLEST SERPL-MCNC: 227 MG/DL
CO2 SERPL-SCNC: 28 MMOL/L (ref 20–32)
CREAT SERPL-MCNC: 0.53 MG/DL (ref 0.52–1.04)
ERYTHROCYTE [DISTWIDTH] IN BLOOD BY AUTOMATED COUNT: 11.9 % (ref 10–15)
FASTING STATUS PATIENT QL REPORTED: YES
GFR SERPL CREATININE-BSD FRML MDRD: >90 ML/MIN/1.73M2
GLUCOSE BLD-MCNC: 112 MG/DL (ref 70–99)
HCT VFR BLD AUTO: 40.4 % (ref 35–47)
HDLC SERPL-MCNC: 33 MG/DL
HGB BLD-MCNC: 13.8 G/DL (ref 11.7–15.7)
LDLC SERPL CALC-MCNC: 148 MG/DL
MCH RBC QN AUTO: 34.2 PG (ref 26.5–33)
MCHC RBC AUTO-ENTMCNC: 34.2 G/DL (ref 31.5–36.5)
MCV RBC AUTO: 100 FL (ref 78–100)
NONHDLC SERPL-MCNC: 194 MG/DL
PLATELET # BLD AUTO: 282 10E3/UL (ref 150–450)
POTASSIUM BLD-SCNC: 4.5 MMOL/L (ref 3.4–5.3)
PROT SERPL-MCNC: 7.1 G/DL (ref 6.8–8.8)
RBC # BLD AUTO: 4.04 10E6/UL (ref 3.8–5.2)
SODIUM SERPL-SCNC: 137 MMOL/L (ref 133–144)
TRIGL SERPL-MCNC: 231 MG/DL
TSH SERPL DL<=0.005 MIU/L-ACNC: 1.37 MU/L (ref 0.4–4)
WBC # BLD AUTO: 5.5 10E3/UL (ref 4–11)

## 2022-04-18 PROCEDURE — 36415 COLL VENOUS BLD VENIPUNCTURE: CPT

## 2022-04-18 PROCEDURE — 84443 ASSAY THYROID STIM HORMONE: CPT

## 2022-04-18 PROCEDURE — 85027 COMPLETE CBC AUTOMATED: CPT

## 2022-04-18 PROCEDURE — 80061 LIPID PANEL: CPT

## 2022-04-18 PROCEDURE — 80053 COMPREHEN METABOLIC PANEL: CPT

## 2022-04-22 ENCOUNTER — OFFICE VISIT (OUTPATIENT)
Dept: FAMILY MEDICINE | Facility: CLINIC | Age: 47
End: 2022-04-22
Payer: COMMERCIAL

## 2022-04-22 VITALS
RESPIRATION RATE: 18 BRPM | HEIGHT: 60 IN | WEIGHT: 169 LBS | HEART RATE: 78 BPM | DIASTOLIC BLOOD PRESSURE: 80 MMHG | SYSTOLIC BLOOD PRESSURE: 130 MMHG | BODY MASS INDEX: 33.18 KG/M2 | OXYGEN SATURATION: 99 % | TEMPERATURE: 98.3 F

## 2022-04-22 DIAGNOSIS — Z00.00 ROUTINE GENERAL MEDICAL EXAMINATION AT A HEALTH CARE FACILITY: ICD-10-CM

## 2022-04-22 DIAGNOSIS — E66.09 CLASS 1 OBESITY DUE TO EXCESS CALORIES WITHOUT SERIOUS COMORBIDITY IN ADULT, UNSPECIFIED BMI: ICD-10-CM

## 2022-04-22 DIAGNOSIS — E66.811 CLASS 1 OBESITY DUE TO EXCESS CALORIES WITHOUT SERIOUS COMORBIDITY IN ADULT, UNSPECIFIED BMI: ICD-10-CM

## 2022-04-22 DIAGNOSIS — Z12.11 SCREEN FOR COLON CANCER: ICD-10-CM

## 2022-04-22 PROCEDURE — 90471 IMMUNIZATION ADMIN: CPT | Performed by: PHYSICIAN ASSISTANT

## 2022-04-22 PROCEDURE — 99396 PREV VISIT EST AGE 40-64: CPT | Mod: 25 | Performed by: PHYSICIAN ASSISTANT

## 2022-04-22 PROCEDURE — 90715 TDAP VACCINE 7 YRS/> IM: CPT | Performed by: PHYSICIAN ASSISTANT

## 2022-04-22 RX ORDER — PHENTERMINE HYDROCHLORIDE 37.5 MG/1
37.5 TABLET ORAL
Qty: 30 TABLET | Refills: 1 | Status: SHIPPED | OUTPATIENT
Start: 2022-04-22 | End: 2024-02-14

## 2022-04-22 SDOH — ECONOMIC STABILITY: INCOME INSECURITY: IN THE LAST 12 MONTHS, WAS THERE A TIME WHEN YOU WERE NOT ABLE TO PAY THE MORTGAGE OR RENT ON TIME?: PATIENT REFUSED

## 2022-04-22 SDOH — ECONOMIC STABILITY: FOOD INSECURITY: WITHIN THE PAST 12 MONTHS, THE FOOD YOU BOUGHT JUST DIDN'T LAST AND YOU DIDN'T HAVE MONEY TO GET MORE.: NEVER TRUE

## 2022-04-22 SDOH — HEALTH STABILITY: PHYSICAL HEALTH: ON AVERAGE, HOW MANY DAYS PER WEEK DO YOU ENGAGE IN MODERATE TO STRENUOUS EXERCISE (LIKE A BRISK WALK)?: 0 DAYS

## 2022-04-22 SDOH — ECONOMIC STABILITY: FOOD INSECURITY: WITHIN THE PAST 12 MONTHS, YOU WORRIED THAT YOUR FOOD WOULD RUN OUT BEFORE YOU GOT MONEY TO BUY MORE.: NEVER TRUE

## 2022-04-22 SDOH — ECONOMIC STABILITY: TRANSPORTATION INSECURITY
IN THE PAST 12 MONTHS, HAS LACK OF TRANSPORTATION KEPT YOU FROM MEETINGS, WORK, OR FROM GETTING THINGS NEEDED FOR DAILY LIVING?: PATIENT DECLINED

## 2022-04-22 SDOH — ECONOMIC STABILITY: INCOME INSECURITY: HOW HARD IS IT FOR YOU TO PAY FOR THE VERY BASICS LIKE FOOD, HOUSING, MEDICAL CARE, AND HEATING?: NOT HARD AT ALL

## 2022-04-22 SDOH — ECONOMIC STABILITY: TRANSPORTATION INSECURITY
IN THE PAST 12 MONTHS, HAS THE LACK OF TRANSPORTATION KEPT YOU FROM MEDICAL APPOINTMENTS OR FROM GETTING MEDICATIONS?: NO

## 2022-04-22 SDOH — HEALTH STABILITY: PHYSICAL HEALTH: ON AVERAGE, HOW MANY MINUTES DO YOU ENGAGE IN EXERCISE AT THIS LEVEL?: 10 MIN

## 2022-04-22 ASSESSMENT — PATIENT HEALTH QUESTIONNAIRE - PHQ9
SUM OF ALL RESPONSES TO PHQ QUESTIONS 1-9: 6
SUM OF ALL RESPONSES TO PHQ QUESTIONS 1-9: 6
10. IF YOU CHECKED OFF ANY PROBLEMS, HOW DIFFICULT HAVE THESE PROBLEMS MADE IT FOR YOU TO DO YOUR WORK, TAKE CARE OF THINGS AT HOME, OR GET ALONG WITH OTHER PEOPLE: NOT DIFFICULT AT ALL

## 2022-04-22 ASSESSMENT — ENCOUNTER SYMPTOMS
ARTHRALGIAS: 1
FEVER: 0
PALPITATIONS: 0
PARESTHESIAS: 0
CHILLS: 0
HEADACHES: 0
HEMATURIA: 0
EYE PAIN: 0
FREQUENCY: 0
HEARTBURN: 0
BREAST MASS: 0
DIZZINESS: 0
SHORTNESS OF BREATH: 0
NERVOUS/ANXIOUS: 1
DYSURIA: 0
DIARRHEA: 0
SORE THROAT: 0
CONSTIPATION: 0
NAUSEA: 0
WEAKNESS: 0
JOINT SWELLING: 1
COUGH: 0
ABDOMINAL PAIN: 0
MYALGIAS: 1
HEMATOCHEZIA: 0

## 2022-04-22 ASSESSMENT — ANXIETY QUESTIONNAIRES
7. FEELING AFRAID AS IF SOMETHING AWFUL MIGHT HAPPEN: SEVERAL DAYS
2. NOT BEING ABLE TO STOP OR CONTROL WORRYING: NOT AT ALL
5. BEING SO RESTLESS THAT IT IS HARD TO SIT STILL: NOT AT ALL
6. BECOMING EASILY ANNOYED OR IRRITABLE: NOT AT ALL
7. FEELING AFRAID AS IF SOMETHING AWFUL MIGHT HAPPEN: SEVERAL DAYS
GAD7 TOTAL SCORE: 2
GAD7 TOTAL SCORE: 2
4. TROUBLE RELAXING: NOT AT ALL
3. WORRYING TOO MUCH ABOUT DIFFERENT THINGS: SEVERAL DAYS
GAD7 TOTAL SCORE: 2
1. FEELING NERVOUS, ANXIOUS, OR ON EDGE: NOT AT ALL

## 2022-04-22 ASSESSMENT — LIFESTYLE VARIABLES
SKIP TO QUESTIONS 9-10: 0
AUDIT-C TOTAL SCORE: -1
HOW MANY STANDARD DRINKS CONTAINING ALCOHOL DO YOU HAVE ON A TYPICAL DAY: PATIENT DECLINED
HOW OFTEN DO YOU HAVE SIX OR MORE DRINKS ON ONE OCCASION: PATIENT DECLINED
HOW OFTEN DO YOU HAVE A DRINK CONTAINING ALCOHOL: PATIENT DECLINED

## 2022-04-22 ASSESSMENT — SOCIAL DETERMINANTS OF HEALTH (SDOH)
IN A TYPICAL WEEK, HOW MANY TIMES DO YOU TALK ON THE PHONE WITH FAMILY, FRIENDS, OR NEIGHBORS?: ONCE A WEEK
DO YOU BELONG TO ANY CLUBS OR ORGANIZATIONS SUCH AS CHURCH GROUPS UNIONS, FRATERNAL OR ATHLETIC GROUPS, OR SCHOOL GROUPS?: NO
HOW OFTEN DO YOU GET TOGETHER WITH FRIENDS OR RELATIVES?: ONCE A WEEK
HOW OFTEN DO YOU ATTEND CHURCH OR RELIGIOUS SERVICES?: NEVER

## 2022-04-22 NOTE — PROGRESS NOTES
SUBJECTIVE:   CC: Sarahy Meng is an 46 year old woman who presents for preventive health visit.     Patient has been advised of split billing requirements and indicates understanding: Yes  Healthy Habits:     Getting at least 3 servings of Calcium per day:  NO    Bi-annual eye exam:  Yes    Dental care twice a year:  Yes    Sleep apnea or symptoms of sleep apnea:  None    Diet:  Regular (no restrictions), Low salt and Carbohydrate counting    Frequency of exercise:  2-3 days/week    Duration of exercise:  Less than 15 minutes    Taking medications regularly:  Yes    Medication side effects:  None    PHQ-2 Total Score: 0    Additional concerns today:  No      The 10-year ASCVD risk score (Cynthia OJEDA Jr., et al., 2013) is: 7.8%    Values used to calculate the score:      Age: 46 years      Sex: Female      Is Non- : No      Diabetic: No      Tobacco smoker: Yes      Systolic Blood Pressure: 130 mmHg      Is BP treated: No      HDL Cholesterol: 33 mg/dL      Total Cholesterol: 227 mg/dL        Today's PHQ-2 Score:   PHQ-2 ( 1999 Pfizer) 4/22/2022   Q1: Little interest or pleasure in doing things 0   Q2: Feeling down, depressed or hopeless 0   PHQ-2 Score 0   PHQ-2 Total Score (12-17 Years)- Positive if 3 or more points; Administer PHQ-A if positive -   Q1: Little interest or pleasure in doing things Not at all   Q2: Feeling down, depressed or hopeless Not at all   PHQ-2 Score 0       Abuse: Current or Past (Physical, Sexual or Emotional) - No  Do you feel safe in your environment? Yes    Have you ever done Advance Care Planning? (For example, a Health Directive, POLST, or a discussion with a medical provider or your loved ones about your wishes): No, advance care planning information given to patient to review.  Patient plans to discuss their wishes with loved ones or provider.      Social History     Tobacco Use     Smoking status: Current Every Day Smoker     Years: 15.00     Types:  Cigarettes     Smokeless tobacco: Never Used     Tobacco comment: 6 ciggs a day   Substance Use Topics     Alcohol use: Yes     Comment: socially       Alcohol Use 4/22/2022   Prescreen: >3 drinks/day or >7 drinks/week? No   Prescreen: >3 drinks/day or >7 drinks/week? -     Reviewed orders with patient.  Reviewed health maintenance and updated orders accordingly - Yes  BP Readings from Last 3 Encounters:   04/22/22 130/80   03/29/22 (!) 162/96   03/10/20 120/80    Wt Readings from Last 3 Encounters:   04/22/22 76.7 kg (169 lb)   03/29/22 75.3 kg (166 lb)   03/10/20 75.3 kg (166 lb)                    Breast Cancer Screening:  Any new diagnosis of family breast, ovarian, or bowel cancer? No    FHS-7:   Breast CA Risk Assessment (FHS-7) 4/12/2022   Did any of your first-degree relatives have breast or ovarian cancer? No   Did any of your relatives have bilateral breast cancer? No   Did any man in your family have breast cancer? No   Did any woman in your family have breast and ovarian cancer? No   Did any woman in your family have breast cancer before age 50 y? No   Do you have 2 or more relatives with breast and/or ovarian cancer? No   Do you have 2 or more relatives with breast and/or bowel cancer? No     click delete button to remove this line now  Mammogram Screening - Offered annual screening and updated Health Maintenance for mutual plan based on risk factor consideration    Pertinent mammograms are reviewed under the imaging tab.    History of abnormal Pap smear: NO - age 30-65 PAP every 5 years with negative HPV co-testing recommended  PAP / HPV Latest Ref Rng & Units 9/13/2018 2/6/2015 11/28/2011   PAP (Historical) - NIL NIL NIL   HPV16 NEG:Negative Negative - -   HPV18 NEG:Negative Negative - -   HRHPV NEG:Negative Negative - -     Reviewed and updated as needed this visit by clinical staff   Tobacco  Allergies  Meds   Med Hx  Surg Hx  Fam Hx  Soc Hx          Reviewed and updated as needed this visit  "by Provider                       Review of Systems   Constitutional: Negative for chills and fever.   HENT: Negative for congestion, ear pain, hearing loss and sore throat.    Eyes: Negative for pain and visual disturbance.   Respiratory: Negative for cough and shortness of breath.    Cardiovascular: Negative for chest pain, palpitations and peripheral edema.   Gastrointestinal: Negative for abdominal pain, constipation, diarrhea, heartburn, hematochezia and nausea.   Breasts:  Negative for tenderness, breast mass and discharge.   Genitourinary: Positive for urgency. Negative for dysuria, frequency, genital sores, hematuria, pelvic pain, vaginal bleeding and vaginal discharge.   Musculoskeletal: Positive for arthralgias, joint swelling and myalgias.   Skin: Negative for rash.   Neurological: Negative for dizziness, weakness, headaches and paresthesias.   Psychiatric/Behavioral: Negative for mood changes. The patient is nervous/anxious.           OBJECTIVE:   /80   Pulse 78   Temp 98.3  F (36.8  C) (Oral)   Resp 18   Ht 1.518 m (4' 11.75\")   Wt 76.7 kg (169 lb)   LMP 04/07/2022 (Approximate)   SpO2 99%   BMI 33.28 kg/m    Physical Exam  GENERAL: healthy, alert and no distress  EYES: Eyes grossly normal to inspection, PERRL and conjunctivae and sclerae normal  HENT: ear canals and TM's normal, nose and mouth without ulcers or lesions  NECK: no adenopathy, no asymmetry, masses, or scars and thyroid normal to palpation  RESP: lungs clear to auscultation - no rales, rhonchi or wheezes  BREAST: normal without masses, tenderness or nipple discharge and no palpable axillary masses or adenopathy  CV: regular rate and rhythm, normal S1 S2, no S3 or S4, no murmur, click or rub, no peripheral edema and peripheral pulses strong  ABDOMEN: soft, nontender, no hepatosplenomegaly, no masses and bowel sounds normal  MS: no gross musculoskeletal defects noted, no edema  SKIN: no suspicious lesions or rashes  NEURO: " "Normal strength and tone, mentation intact and speech normal  PSYCH: mentation appears normal, affect normal/bright    Diagnostic Test Results:  Labs reviewed in Epic    ASSESSMENT/PLAN:   (Z12.11) Screen for colon cancer  Comment:   Plan: Adult Gastro Ref - Procedure Only            (Z00.00) Routine general medical examination at a health care facility  Comment:   Plan:     (E66.09) Class 1 obesity due to excess calories without serious comorbidity in adult, unspecified BMI  Comment:well treat for 2 months.  Risks, benefits, side effects and intended purposes discussed.        Plan: phentermine (ADIPEX-P) 37.5 MG tablet                  COUNSELING:  Reviewed preventive health counseling, as reflected in patient instructions       Regular exercise       Healthy diet/nutrition       Vision screening       Hearing screening    Estimated body mass index is 33.28 kg/m  as calculated from the following:    Height as of this encounter: 1.518 m (4' 11.75\").    Weight as of this encounter: 76.7 kg (169 lb).    Weight management plan: Discussed healthy diet and exercise guidelines    She reports that she has been smoking cigarettes. She has smoked for the past 15.00 years. She has never used smokeless tobacco.  Tobacco Cessation Action Plan:   Information offered: Patient not interested at this time      Counseling Resources:  ATP IV Guidelines  Pooled Cohorts Equation Calculator  Breast Cancer Risk Calculator  BRCA-Related Cancer Risk Assessment: FHS-7 Tool  FRAX Risk Assessment  ICSI Preventive Guidelines  Dietary Guidelines for Americans, 2010  USDA's MyPlate  ASA Prophylaxis  Lung CA Screening    Ramona Ann Aaseby-Aguilera, PA-C  [facility]  Answers for HPI/ROS submitted by the patient on 4/22/2022  If you checked off any problems, how difficult have these problems made it for you to do your work, take care of things at home, or get along with other people?: Not difficult at all  PHQ9 TOTAL SCORE: " 6  TAM 7 TOTAL SCORE: 2

## 2022-04-23 ASSESSMENT — ANXIETY QUESTIONNAIRES: GAD7 TOTAL SCORE: 2

## 2022-04-23 ASSESSMENT — PATIENT HEALTH QUESTIONNAIRE - PHQ9: SUM OF ALL RESPONSES TO PHQ QUESTIONS 1-9: 6

## 2022-04-26 ENCOUNTER — TELEPHONE (OUTPATIENT)
Dept: NEUROSURGERY | Facility: CLINIC | Age: 47
End: 2022-04-26
Payer: COMMERCIAL

## 2022-04-26 NOTE — CONFIDENTIAL NOTE
Called patient and reviewed MRI results   Recommend continuing PT  I am happy to hear her pain is improving   Also advised I am happy to place pain referral for injections as indicated- she will call if interested    Carolina CARLOS [facility]  [address] [address]    Tel [phone]

## 2022-04-28 ENCOUNTER — TELEPHONE (OUTPATIENT)
Dept: FAMILY MEDICINE | Facility: CLINIC | Age: 47
End: 2022-04-28

## 2022-04-28 NOTE — TELEPHONE ENCOUNTER
Prior Authorization Retail Medication Request    Medication/Dose: phentermine (ADIPEX-P) 37.5 MG tablet  ICD code (if different than what is on RX):    Previously Tried and Failed: phentermine (ADIPEX-P) 15 MG capsule    Rationale: Patient has been taking this medication previously.      Insurance Name:  [employer]   Insurance ID:  [id number]      Pharmacy Information (if different than what is on RX)  Name:    Phone:        Samuel RAMOS

## 2022-05-02 NOTE — TELEPHONE ENCOUNTER
Central Prior Authorization Team   Phone: [phone]    PA Initiation    Medication: phentermine (ADIPEX-P) 37.5 MG tablet  Insurance Company: [facility] - Phone [phone] Fax [phone]  Pharmacy Filling the Rx: [id number] [place]  [address] [address]  Filling Pharmacy Phone: [phone]  Filling Pharmacy Fax:    Start Date: 5/2/2022

## 2022-05-02 NOTE — TELEPHONE ENCOUNTER
PRIOR AUTHORIZATION DENIED    Medication: phentermine (ADIPEX-P) 37.5 MG tablet    Denial Date: 5/2/2022    Denial Rational:  Per insurance, medication is excluded from patient's benefit plan and will not be covered. Review and appeal are not available because of this exclusion.              Appeal Information:  N/A

## 2022-05-21 ENCOUNTER — APPOINTMENT (OUTPATIENT)
Dept: CT IMAGING | Facility: CLINIC | Age: 47
End: 2022-05-21
Attending: EMERGENCY MEDICINE
Payer: COMMERCIAL

## 2022-05-21 ENCOUNTER — HOSPITAL ENCOUNTER (INPATIENT)
Facility: CLINIC | Age: 47
LOS: 1 days | Discharge: HOME | End: 2022-05-22
Attending: EMERGENCY MEDICINE | Admitting: INTERNAL MEDICINE
Payer: COMMERCIAL

## 2022-05-21 DIAGNOSIS — S22.41XA CLOSED FRACTURE OF MULTIPLE RIBS OF RIGHT SIDE, INITIAL ENCOUNTER: ICD-10-CM

## 2022-05-21 DIAGNOSIS — S09.90XA CLOSED HEAD INJURY, INITIAL ENCOUNTER: ICD-10-CM

## 2022-05-21 DIAGNOSIS — S37.091A: ICD-10-CM

## 2022-05-21 DIAGNOSIS — S37.031A LACERATION OF RIGHT KIDNEY, INITIAL ENCOUNTER: ICD-10-CM

## 2022-05-21 LAB
ABO/RH(D): NORMAL
ALBUMIN SERPL-MCNC: 3.9 G/DL (ref 3.4–5)
ALP SERPL-CCNC: 64 U/L (ref 40–150)
ALT SERPL W P-5'-P-CCNC: 115 U/L (ref 0–50)
ANION GAP SERPL CALCULATED.3IONS-SCNC: 11 MMOL/L (ref 3–14)
ANTIBODY SCREEN: NEGATIVE
AST SERPL W P-5'-P-CCNC: 121 U/L (ref 0–45)
BASOPHILS # BLD AUTO: 0 10E3/UL (ref 0–0.2)
BASOPHILS NFR BLD AUTO: 0 %
BILIRUB SERPL-MCNC: 0.5 MG/DL (ref 0.2–1.3)
BUN SERPL-MCNC: 10 MG/DL (ref 7–30)
CALCIUM SERPL-MCNC: 9.3 MG/DL (ref 8.5–10.1)
CHLORIDE BLD-SCNC: 105 MMOL/L (ref 94–109)
CO2 SERPL-SCNC: 20 MMOL/L (ref 20–32)
CREAT SERPL-MCNC: 0.61 MG/DL (ref 0.52–1.04)
EOSINOPHIL # BLD AUTO: 0.1 10E3/UL (ref 0–0.7)
EOSINOPHIL NFR BLD AUTO: 1 %
ERYTHROCYTE [DISTWIDTH] IN BLOOD BY AUTOMATED COUNT: 12.4 % (ref 10–15)
GFR SERPL CREATININE-BSD FRML MDRD: >90 ML/MIN/1.73M2
GLUCOSE BLD-MCNC: 145 MG/DL (ref 70–99)
HCG SER QL IA.RAPID: NEGATIVE
HCT VFR BLD AUTO: 43.4 % (ref 35–47)
HGB BLD-MCNC: 13.4 G/DL (ref 11.7–15.7)
HGB BLD-MCNC: 15 G/DL (ref 11.7–15.7)
HOLD SPECIMEN: NORMAL
IMM GRANULOCYTES # BLD: 0.1 10E3/UL
IMM GRANULOCYTES NFR BLD: 1 %
LIPASE SERPL-CCNC: 248 U/L (ref 73–393)
LYMPHOCYTES # BLD AUTO: 2.6 10E3/UL (ref 0.8–5.3)
LYMPHOCYTES NFR BLD AUTO: 18 %
MCH RBC QN AUTO: 33.7 PG (ref 26.5–33)
MCHC RBC AUTO-ENTMCNC: 34.6 G/DL (ref 31.5–36.5)
MCV RBC AUTO: 98 FL (ref 78–100)
MONOCYTES # BLD AUTO: 0.6 10E3/UL (ref 0–1.3)
MONOCYTES NFR BLD AUTO: 4 %
NEUTROPHILS # BLD AUTO: 11.1 10E3/UL (ref 1.6–8.3)
NEUTROPHILS NFR BLD AUTO: 76 %
NRBC # BLD AUTO: 0 10E3/UL
NRBC BLD AUTO-RTO: 0 /100
PLATELET # BLD AUTO: 306 10E3/UL (ref 150–450)
POTASSIUM BLD-SCNC: 3.5 MMOL/L (ref 3.4–5.3)
POTASSIUM BLD-SCNC: 4.1 MMOL/L (ref 3.4–5.3)
PROT SERPL-MCNC: 7.8 G/DL (ref 6.8–8.8)
RBC # BLD AUTO: 4.45 10E6/UL (ref 3.8–5.2)
SARS-COV-2 RNA RESP QL NAA+PROBE: NEGATIVE
SODIUM SERPL-SCNC: 136 MMOL/L (ref 133–144)
SPECIMEN EXPIRATION DATE: NORMAL
WBC # BLD AUTO: 14.6 10E3/UL (ref 4–11)

## 2022-05-21 PROCEDURE — C9803 HOPD COVID-19 SPEC COLLECT: HCPCS

## 2022-05-21 PROCEDURE — 84132 ASSAY OF SERUM POTASSIUM: CPT | Performed by: INTERNAL MEDICINE

## 2022-05-21 PROCEDURE — 99222 1ST HOSP IP/OBS MODERATE 55: CPT | Performed by: SURGERY

## 2022-05-21 PROCEDURE — U0003 INFECTIOUS AGENT DETECTION BY NUCLEIC ACID (DNA OR RNA); SEVERE ACUTE RESPIRATORY SYNDROME CORONAVIRUS 2 (SARS-COV-2) (CORONAVIRUS DISEASE [COVID-19]), AMPLIFIED PROBE TECHNIQUE, MAKING USE OF HIGH THROUGHPUT TECHNOLOGIES AS DESCRIBED BY CMS-2020-01-R: HCPCS | Performed by: EMERGENCY MEDICINE

## 2022-05-21 PROCEDURE — 84702 CHORIONIC GONADOTROPIN TEST: CPT

## 2022-05-21 PROCEDURE — 83690 ASSAY OF LIPASE: CPT | Performed by: EMERGENCY MEDICINE

## 2022-05-21 PROCEDURE — 70450 CT HEAD/BRAIN W/O DYE: CPT

## 2022-05-21 PROCEDURE — 86901 BLOOD TYPING SEROLOGIC RH(D): CPT | Performed by: EMERGENCY MEDICINE

## 2022-05-21 PROCEDURE — 93005 ELECTROCARDIOGRAM TRACING: CPT

## 2022-05-21 PROCEDURE — 250N000009 HC RX 250: Performed by: EMERGENCY MEDICINE

## 2022-05-21 PROCEDURE — 85025 COMPLETE CBC W/AUTO DIFF WBC: CPT | Performed by: EMERGENCY MEDICINE

## 2022-05-21 PROCEDURE — 36415 COLL VENOUS BLD VENIPUNCTURE: CPT | Performed by: EMERGENCY MEDICINE

## 2022-05-21 PROCEDURE — 99285 EMERGENCY DEPT VISIT HI MDM: CPT | Mod: 25

## 2022-05-21 PROCEDURE — 72125 CT NECK SPINE W/O DYE: CPT

## 2022-05-21 PROCEDURE — 250N000013 HC RX MED GY IP 250 OP 250 PS 637: Performed by: INTERNAL MEDICINE

## 2022-05-21 PROCEDURE — 250N000011 HC RX IP 250 OP 636: Performed by: EMERGENCY MEDICINE

## 2022-05-21 PROCEDURE — 99223 1ST HOSP IP/OBS HIGH 75: CPT | Mod: AI | Performed by: INTERNAL MEDICINE

## 2022-05-21 PROCEDURE — 96376 TX/PRO/DX INJ SAME DRUG ADON: CPT

## 2022-05-21 PROCEDURE — 85018 HEMOGLOBIN: CPT | Performed by: INTERNAL MEDICINE

## 2022-05-21 PROCEDURE — 96374 THER/PROPH/DIAG INJ IV PUSH: CPT | Mod: 59

## 2022-05-21 PROCEDURE — 74177 CT ABD & PELVIS W/CONTRAST: CPT

## 2022-05-21 PROCEDURE — 120N000001 HC R&B MED SURG/OB

## 2022-05-21 PROCEDURE — 96375 TX/PRO/DX INJ NEW DRUG ADDON: CPT

## 2022-05-21 PROCEDURE — 36415 COLL VENOUS BLD VENIPUNCTURE: CPT | Performed by: INTERNAL MEDICINE

## 2022-05-21 PROCEDURE — 80053 COMPREHEN METABOLIC PANEL: CPT | Performed by: EMERGENCY MEDICINE

## 2022-05-21 RX ORDER — AMOXICILLIN 250 MG
2 CAPSULE ORAL 2 TIMES DAILY PRN
Status: DISCONTINUED | OUTPATIENT
Start: 2022-05-21 | End: 2022-05-22 | Stop reason: HOSPADM

## 2022-05-21 RX ORDER — LIDOCAINE 4 G/G
1 PATCH TOPICAL EVERY 24 HOURS
Status: DISCONTINUED | OUTPATIENT
Start: 2022-05-21 | End: 2022-05-22 | Stop reason: HOSPADM

## 2022-05-21 RX ORDER — ONDANSETRON 4 MG/1
4 TABLET, ORALLY DISINTEGRATING ORAL EVERY 6 HOURS PRN
Status: DISCONTINUED | OUTPATIENT
Start: 2022-05-21 | End: 2022-05-22 | Stop reason: HOSPADM

## 2022-05-21 RX ORDER — IOPAMIDOL 755 MG/ML
500 INJECTION, SOLUTION INTRAVASCULAR ONCE
Status: COMPLETED | OUTPATIENT
Start: 2022-05-21 | End: 2022-05-21

## 2022-05-21 RX ORDER — NALOXONE HYDROCHLORIDE 0.4 MG/ML
0.2 INJECTION, SOLUTION INTRAMUSCULAR; INTRAVENOUS; SUBCUTANEOUS
Status: DISCONTINUED | OUTPATIENT
Start: 2022-05-21 | End: 2022-05-22 | Stop reason: HOSPADM

## 2022-05-21 RX ORDER — ACETAMINOPHEN 325 MG/1
975 TABLET ORAL EVERY 8 HOURS
Status: DISCONTINUED | OUTPATIENT
Start: 2022-05-21 | End: 2022-05-22 | Stop reason: HOSPADM

## 2022-05-21 RX ORDER — GABAPENTIN 300 MG/1
300 CAPSULE ORAL 3 TIMES DAILY
Status: DISCONTINUED | OUTPATIENT
Start: 2022-05-21 | End: 2022-05-22 | Stop reason: HOSPADM

## 2022-05-21 RX ORDER — ONDANSETRON 2 MG/ML
4 INJECTION INTRAMUSCULAR; INTRAVENOUS EVERY 30 MIN PRN
Status: DISCONTINUED | OUTPATIENT
Start: 2022-05-21 | End: 2022-05-21

## 2022-05-21 RX ORDER — PROCHLORPERAZINE 25 MG
25 SUPPOSITORY, RECTAL RECTAL EVERY 12 HOURS PRN
Status: DISCONTINUED | OUTPATIENT
Start: 2022-05-21 | End: 2022-05-22 | Stop reason: HOSPADM

## 2022-05-21 RX ORDER — OXYCODONE HYDROCHLORIDE 5 MG/1
5 TABLET ORAL EVERY 4 HOURS PRN
Status: DISCONTINUED | OUTPATIENT
Start: 2022-05-21 | End: 2022-05-22 | Stop reason: HOSPADM

## 2022-05-21 RX ORDER — ONDANSETRON 2 MG/ML
4 INJECTION INTRAMUSCULAR; INTRAVENOUS EVERY 6 HOURS PRN
Status: DISCONTINUED | OUTPATIENT
Start: 2022-05-21 | End: 2022-05-22 | Stop reason: HOSPADM

## 2022-05-21 RX ORDER — MORPHINE SULFATE 4 MG/ML
4 INJECTION, SOLUTION INTRAMUSCULAR; INTRAVENOUS
Status: COMPLETED | OUTPATIENT
Start: 2022-05-21 | End: 2022-05-21

## 2022-05-21 RX ORDER — AMOXICILLIN 250 MG
1 CAPSULE ORAL 2 TIMES DAILY PRN
Status: DISCONTINUED | OUTPATIENT
Start: 2022-05-21 | End: 2022-05-22 | Stop reason: HOSPADM

## 2022-05-21 RX ORDER — HYDROMORPHONE HCL IN WATER/PF 6 MG/30 ML
0.2 PATIENT CONTROLLED ANALGESIA SYRINGE INTRAVENOUS
Status: DISCONTINUED | OUTPATIENT
Start: 2022-05-21 | End: 2022-05-22 | Stop reason: HOSPADM

## 2022-05-21 RX ORDER — NALOXONE HYDROCHLORIDE 0.4 MG/ML
0.4 INJECTION, SOLUTION INTRAMUSCULAR; INTRAVENOUS; SUBCUTANEOUS
Status: DISCONTINUED | OUTPATIENT
Start: 2022-05-21 | End: 2022-05-22 | Stop reason: HOSPADM

## 2022-05-21 RX ORDER — LIDOCAINE 40 MG/G
CREAM TOPICAL
Status: DISCONTINUED | OUTPATIENT
Start: 2022-05-21 | End: 2022-05-22 | Stop reason: HOSPADM

## 2022-05-21 RX ORDER — PROCHLORPERAZINE MALEATE 10 MG
10 TABLET ORAL EVERY 6 HOURS PRN
Status: DISCONTINUED | OUTPATIENT
Start: 2022-05-21 | End: 2022-05-22 | Stop reason: HOSPADM

## 2022-05-21 RX ORDER — METHOCARBAMOL 500 MG/1
500 TABLET, FILM COATED ORAL EVERY 6 HOURS PRN
Status: DISCONTINUED | OUTPATIENT
Start: 2022-05-21 | End: 2022-05-22 | Stop reason: HOSPADM

## 2022-05-21 RX ORDER — IBUPROFEN 600 MG/1
600 TABLET, FILM COATED ORAL EVERY 6 HOURS PRN
Status: DISCONTINUED | OUTPATIENT
Start: 2022-05-21 | End: 2022-05-22 | Stop reason: HOSPADM

## 2022-05-21 RX ADMIN — ONDANSETRON 4 MG: 2 INJECTION INTRAMUSCULAR; INTRAVENOUS at 12:17

## 2022-05-21 RX ADMIN — GABAPENTIN 300 MG: 300 CAPSULE ORAL at 16:47

## 2022-05-21 RX ADMIN — OXYCODONE HYDROCHLORIDE 5 MG: 5 TABLET ORAL at 16:46

## 2022-05-21 RX ADMIN — MORPHINE SULFATE 4 MG: 4 INJECTION INTRAVENOUS at 12:54

## 2022-05-21 RX ADMIN — OXYCODONE HYDROCHLORIDE 5 MG: 5 TABLET ORAL at 22:13

## 2022-05-21 RX ADMIN — MORPHINE SULFATE 4 MG: 4 INJECTION INTRAVENOUS at 14:36

## 2022-05-21 RX ADMIN — GABAPENTIN 300 MG: 300 CAPSULE ORAL at 22:13

## 2022-05-21 RX ADMIN — Medication 1 MG: at 22:13

## 2022-05-21 RX ADMIN — ACETAMINOPHEN 975 MG: 325 TABLET, FILM COATED ORAL at 16:47

## 2022-05-21 RX ADMIN — ONDANSETRON 4 MG: 2 INJECTION INTRAMUSCULAR; INTRAVENOUS at 14:37

## 2022-05-21 RX ADMIN — MORPHINE SULFATE 4 MG: 4 INJECTION INTRAVENOUS at 12:17

## 2022-05-21 RX ADMIN — LIDOCAINE 1 PATCH: 246 PATCH TOPICAL at 16:47

## 2022-05-21 RX ADMIN — IOPAMIDOL 82 ML: 755 INJECTION, SOLUTION INTRAVENOUS at 12:36

## 2022-05-21 RX ADMIN — SODIUM CHLORIDE 61 ML: 9 INJECTION, SOLUTION INTRAVENOUS at 12:36

## 2022-05-21 ASSESSMENT — ENCOUNTER SYMPTOMS
SHORTNESS OF BREATH: 1
ABDOMINAL PAIN: 0
BACK PAIN: 1
HEADACHES: 0
NECK PAIN: 0
ARTHRALGIAS: 0

## 2022-05-21 ASSESSMENT — ACTIVITIES OF DAILY LIVING (ADL)
ADLS_ACUITY_SCORE: 35
ADLS_ACUITY_SCORE: 24
ADLS_ACUITY_SCORE: 24
ADLS_ACUITY_SCORE: 20
ADLS_ACUITY_SCORE: 24

## 2022-05-21 NOTE — ED PROVIDER NOTES
History   Chief Complaint:  Fall     The history is provided by the patient.      Sarahy Meng is a 46 year old female with history of arthritis and chronic back pain who presents with back and rib pain sustained from a horse riding accident. About 30 minutes ago (1130), the patient fell forward off of a horse and hit her right side on a fence. She also hit her head, but has no headache and did not lose consciousness. She was turned and made awkward contact, causing pain in her right ribs and central lower back region. Sarahy notes she feels light headed and is experiencing shortness of breath, but denies neck pain, abdominal pain or pain in her legs. She was not wearing a helmet. The patient is not on any blood thinners.     Review of Systems   Respiratory: Positive for shortness of breath.    Cardiovascular: Positive for chest pain (rib pain).   Gastrointestinal: Negative for abdominal pain.   Musculoskeletal: Positive for back pain. Negative for arthralgias (leg pain ) and neck pain.   Neurological: Negative for syncope and headaches.   All other systems reviewed and are negative.    Allergies:  The patient has no known allergies.     Medications:  Mirena IUD  Adipex-P    Past Medical History:     Contact dermatitis   Arthritis  Chronic pain  Esophageal reflux     Family History:    Mother - Arthritis, Hypertension, Thyroid disease, Clotting disorder, Colitis  Father - Colon polyps    Social History:  She arrived in the ED via private vehicle accompanied by her .     Physical Exam     Patient Vitals for the past 24 hrs:   BP Temp Temp src Pulse Resp SpO2 Weight   05/21/22 1345 (!) 172/110 -- -- 94 11 96 % --   05/21/22 1330 -- 97.7  F (36.5  C) Temporal 85 11 98 % --   05/21/22 1315 -- -- -- -- -- 96 % --   05/21/22 1300 -- -- -- -- -- 98 % --   05/21/22 1215 (!) 193/111 -- -- 90 23 100 % --   05/21/22 1210 -- -- -- 98 -- 99 % --   05/21/22 1205 (!) 187/117 -- -- 94 -- 98 % --   05/21/22 1203 -- -- --  -- -- -- 74.4 kg (164 lb)       Physical Exam  General: Adult female, uncomfortable appearing, laying on stretcher  Eyes: PERRL, Conjunctive within normal limits.  EOMI.  HENT: Left posterior scalp hematoma/soft tissue swelling.  Mild associated tenderness.  No palpable skull depression or crepitus.  Moist mucous membranes, oropharynx clear.   Neck: Nontender.  Normal spontaneous range of motion.  CV: Normal S1S2, no murmur, rub or gallop. Regular rate and rhythm  Resp: Clear to auscultation bilaterally, no wheezes, rales or rhonchi.  Mildly increased work of breathing.  Tachypneic.    GI: Abdomen is soft and nondistended.  Mild right upper and mid abdominal tenderness to palpation.  No palpable masses. No rebound or guarding.  MSK: Significant tenderness over the right anterior lateral rib margins mid chest.  No flail.  No palpable crepitus.  No palpable edema or deformity.  No extremity edema.  All extremities are nontender normal active range of motion.  Skin: Warm and dry. No rashes or lesions or ecchymoses on visible skin.  Neuro: Alert and oriented. Responds appropriately to all questions and commands. No focal findings appreciated. Normal muscle tone.  Psych: Appropriate mood and affect.    Emergency Department Course   ECG  ECG results from 05/21/22   EKG 12-lead, tracing only     Value    Systolic Blood Pressure     Diastolic Blood Pressure     Ventricular Rate 77    Atrial Rate 77    IA Interval 166    QRS Duration 86        QTc 443    P Axis 49    R AXIS 29    T Axis 63    Interpretation ECG      Sinus rhythm  Minimal voltage criteria for LVH, may be normal variant  Nonspecific T wave abnormality  Abnormal ECG  No previous ECGs available         Imaging:  Cervical spine CT w/o contrast   Preliminary Result   IMPRESSION:   1.  No fracture or posttraumatic subluxation.   2.  No high-grade spinal canal, with moderate canal compromise and moderate-severe narrowing mid cervical spine.         CT  Chest/Abdomen/Pelvis w Contrast   Final Result   IMPRESSION:   1.  Right perinephric hemorrhage with 2 small linear foci in the upper pole cortex of the right kidney concerning for lacerations. These findings would be compatible with a grade 2 renal injury.   2.  Right sixth through eighth rib fractures.   3.  Colonic diverticulosis without active diverticulitis.      4.  Report was discussed with Dr. Reena Liu 5/21/2022 1315 hours.      CT Head w/o Contrast   Preliminary Result   IMPRESSION:   1.  No acute process intracranially.      2.  No fractures.      3.  Suggested very mild left posterior parietal scalp swelling.      4.  No extra-axial hemorrhage.           Report per radiology    Laboratory:  Labs Ordered and Resulted from Time of ED Arrival to Time of ED Departure   COMPREHENSIVE METABOLIC PANEL - Abnormal       Result Value    Sodium 136      Potassium 3.5      Chloride 105      Carbon Dioxide (CO2) 20      Anion Gap 11      Urea Nitrogen 10      Creatinine 0.61      Calcium 9.3      Glucose 145 (*)     Alkaline Phosphatase 64       (*)      (*)     Protein Total 7.8      Albumin 3.9      Bilirubin Total 0.5      GFR Estimate >90     CBC WITH PLATELETS AND DIFFERENTIAL - Abnormal    WBC Count 14.6 (*)     RBC Count 4.45      Hemoglobin 15.0      Hematocrit 43.4      MCV 98      MCH 33.7 (*)     MCHC 34.6      RDW 12.4      Platelet Count 306      % Neutrophils 76      % Lymphocytes 18      % Monocytes 4      % Eosinophils 1      % Basophils 0      % Immature Granulocytes 1      NRBCs per 100 WBC 0      Absolute Neutrophils 11.1 (*)     Absolute Lymphocytes 2.6      Absolute Monocytes 0.6      Absolute Eosinophils 0.1      Absolute Basophils 0.0      Absolute Immature Granulocytes 0.1      Absolute NRBCs 0.0     LIPASE - Normal    Lipase 248     ISTAT HCG QUALITATIVE PREGNANCY POCT - Normal    HCG Qualitative POCT Negative     ABO/RH TYPE AND SCREEN      Emergency Department  Course:    Reviewed:  I reviewed nursing notes, vitals, past medical history and Care Everywhere    Assessments:  1201 I obtained history and examined the patient as noted above.   1205 Performed FAST exam  1328 I rechecked the patient and explained findings.     Consults:  1314 I spoke with Dr. Gomez from radiology regarding the patient.   1323 I spoke with Dr. Barber from Trauma surgery regarding the patient.   I spoke with Dr. Caldwell of the hospitalist service for admission.    Interventions:  1217 ZOFRAN 4 mg, IV  1217 Morphine 4 mg, IV  1254 Morphine 4 mg, IV    Disposition:  The patient was admitted to the hospital under the care of Dr. Caldwell with Dr. Barber from trauma surgery consulting    Impression & Plan       Medical Decision Making:  Sarahy Meng is a 46-year-old female who presents emergency department with concerns for trauma after unhelmeted fall from a horse just prior to arrival.  Concerns were for right sided chest and abdominal pain as well as head injury without associated headache or neck pain.  No hypoxia, tachycardia or hypotension.  She is hypertensive on arrival, likely pain response.  Normal mental status.  FAST exam at the bedside negative.  CT head and neck, chest abdomen pelvis demonstrated findings as above, most concerning for rib fractures 6 through 8 without pneumothorax or underlying pulmonary contusion evident and perinephric hematoma with no signs of active bleeding but likely underlying small renal lacerations.  She remained hemodynamically stable.  She is not anemic.  I consulted with our general/trauma surgery who felt comfortable with keeping the patient here at [facility] for further care management.  The patient was comfortable this plan as well.  All questions answered prior to admission.      Covid-19  Sarahy Meng was evaluated during a global COVID-19 pandemic, which necessitated consideration that the patient might be at risk for infection with the  SARS-CoV-2 virus that causes COVID-19.   Applicable protocols for evaluation were followed during the patient's care.     Diagnosis:    ICD-10-CM    1. Closed fracture of multiple ribs of right side, initial encounter  S22.41XA    2. Traumatic perinephric hematoma of right kidney, initial encounter  S37.091A    3. Laceration of right kidney, initial encounter  S37.031A    4. Closed head injury, initial encounter  S09.90XA        Scribe Disclosure:  I, FLAVIO ESCALANTE, am serving as a scribe at 12:00 PM on 5/21/2022 to document services personally performed by Reena Liu MD based on my observations and the provider's statements to me.          Reena Liu MD  05/21/22 [id number]

## 2022-05-21 NOTE — ED TRIAGE NOTES
Pt fell forward and hit r side of body on fence and then back of head on ground approx 30 mins PTA. No LOC. No neck pain. CMS intact. Not on blood thinners. Not wearing helmet. C/o RLQ and R rib pain. AbC intact. A&Ox4.

## 2022-05-21 NOTE — H&P
"[facility]    History and Physical - Hospitalist Service       Date of Admission:  5/21/2022    Assessment & Plan      Sarahy Meng is a 46 year old female admitted on 5/21/2022. She presented here with a fall event apparently from a horse riding incident.    Problem list:    #1 fall from horse riding event  #2  Multiple rib fractures, right 6-8 rib fractures secondary from #1  #3 perinephric hemorrhage secondary to #1      Admit as inpatient.  Utilize rib fracture order set  Will need formal evaluation from trauma service.  Requested general surgery evaluation.  Will await further recommendations.  Keep her n.p.o. until seen and optimized from surgical perspective  Will also await if patient will be needing formal urology input  -Earlier had some hematuria as anticipated  Optimize pain control.  Aggressive incentive spirometer.  As needed antiemetics.  As needed muscle relaxants.  Monitor hemoglobin.  Patient had a type and cross in the emergency room.    #4 Transminitis likely from recent fall  -Monitor and recheck  -Patient is also a regular EtOH drinker  -She denies any EtOH consumption earlier today    #5 tobacco abuser  -Smoking cessation counseling provided         Diet:  Regular   DVT Prophylaxis: Pneumatic Compression Devices, no chemo DVT prophylaxis given perinephric hemorrhage  Lester Catheter: Not present  Central Lines: None  Cardiac Monitoring: None  Code Status:  Full    Clinically Significant Risk Factors Present on Admission                  # Obesity: Estimated body mass index is 32.3 kg/m  as calculated from the following:    Height as of 4/22/22: 1.518 m (4' 11.75\").    Weight as of this encounter: 74.4 kg (164 lb).      Disposition Plan   Expected Discharge:  anticipated at least 2 days  Anticipated discharge location:  Awaiting care coordination huddle  Delays:            The patient's care was discussed with the Patient and ED service.  Family updated as patient's "  present at bedside, discussed with nursing service as well    Ernst Caldwell MD, MD  Hospitalist Service    Securely message with the [employer] (learn more here)  Text page via [employer] Paging/Directory         ______________________________________________________________________    Chief Complaint   Fall event earlier today as apparently she fell while riding her horse    History is obtained from the patient    History of Present Illness   Sarahy Meng is a 46 year old female with history of arthritis and chronic back pain who was in her usual state of health and unfortunately has to present to the emergency room as apparently she sustained a fall event while riding her horse and immediately had complaints of back and rib pain.  She it was reported that around 11:30 AM she fell forward off a horse and hit her right side of her body on a fence.  There was also report of that she hit her head at that time but had no reported passing out spells, blurry vision, focal weakness or severe headaches.  She has been complaining of pain mostly on her right rib cage area and lower back.  There are some episodes of shortness of breath and lightheadedness but no reported abdominal pain, other bleeding tendencies, neck pain, numbness or tingling sensation nor mental status changes.  She is not on any anticoagulants.   Upon presentation in the emergency room I believe she was triaged as a trauma patient and presenting vital signs showing hypertension, reassuring EKG, normal electrolytes and kidney function, CBC revealed stable hemoglobin and hematocrit with leukocytosis of 14.  Lipase levels was normal.  She was provided with pain regimen and subsequent hemodynamics improved.  Earlier hypertension resolving.   Multiple imaging was pursued with CT of the cervical spine showed no fracture or posttraumatic subluxation and CT of the head revealed no obvious fractures,  intracranial hemorrhage but revealed parietal soft tissue swelling.  CT of the chest abdomen and pelvis showed no obvious pneumothorax, effusion but found with right perinephric hemorrhage with 2 small linear foci in the upper pole cortex of the right kidney concerning for laceration this findings would be compatible with grade 2 renal injury and right 6 through eighth rib fractures.  Her case was discussed with trauma service on call and discussion stated that patient can be managed here at [facility], close monitoring, pain control hence this referral to our service for further management of care.      Review of Systems    The 10 point Review of Systems is negative other than noted in the HPI or here.     Past Medical History    I have reviewed this patient's medical history and updated it with pertinent information if needed.   Past Medical History:   Diagnosis Date     Contact dermatitis and other eczema, due to unspecified cause      Depressive disorder, not elsewhere classified      Esophageal reflux        Past Surgical History   I have reviewed this patient's surgical history and updated it with pertinent information if needed.  Past Surgical History:   Procedure Laterality Date     [facility] NONSPECIFIC PROCEDURE       - boy 89     [facility] NONSPECIFIC PROCEDURE      R. [employer]        Social History   I have reviewed this patient's social history and updated it with pertinent information if needed.  Social History     Tobacco Use     Smoking status: Current Every Day Smoker     Years: 15.00     Types: Cigarettes     Smokeless tobacco: Never Used     Tobacco comment: 6 ciggs a day   Vaping Use     Vaping Use: Never used   Substance Use Topics     Alcohol use: Yes     Comment: socially     Drug use: Not Currently     Types: Marijuana       Family History   I have reviewed this patient's family history and updated it with pertinent information if needed.  Family History   Problem Relation Age of Onset      Heart Disease Maternal Grandfather      Diabetes Maternal Grandfather      Cerebrovascular Disease Maternal Grandfather      Arthritis Maternal Grandfather      Cancer Maternal Grandfather         lung     Arthritis Mother      Hypertension Mother      Thyroid Disease Mother      Clotting Disorder Mother         blood     Colitis Mother      Colon Polyps Father      Breast Cancer No family hx of      Cancer - colorectal No family hx of        Prior to Admission Medications   Prior to Admission Medications   Prescriptions Last Dose Informant Patient Reported? Taking?   cyclobenzaprine (FLEXERIL) 10 MG tablet   No No   Sig: Take 1 tablet (10 mg) by mouth 3 times daily as needed for muscle spasms   levonorgestrel (MIRENA) 20 MCG/24HR IUD   Yes No   Si each (20 mcg) by Intrauterine route once   phentermine (ADIPEX-P) 37.5 MG tablet   No No   Sig: Take 1 tablet (37.5 mg) by mouth every morning (before breakfast)      Facility-Administered Medications Last Administration Doses Remaining   medroxyPROGESTERone (DEPO-PROVERA) injection 150 mg 2020  3:20 PM         Allergies   Allergies   Allergen Reactions     No Known Drug Allergies        Physical Exam   Vital Signs: Temp: 97.7  F (36.5  C) Temp src: Temporal BP: (!) 172/110 Pulse: 94   Resp: 11 SpO2: 96 %      Weight: 164 lbs 0 oz    HEENT; Atraumatic, normocephalic, pinkish conjuctiva, pupils bilateral reactive   Skin: warm and moist, no rashes  Lymphatics: no cervical or axillary lymphandenopathy  Lungs: equal chest expansion, clear to auscultation, no wheezes, no stridor, no crackles,   Point tenderness at right rib cage area  Heart: normal rate, normal rhythm, no rubs or gallops.   Abdomen: normal bowel sounds, no tenderness, no peritoneal signs, no guarding  Tender upon palpation right back area  Extremities: no deformities, no edema   Neuro; follow commands, alert and oriented x3, spontaneous speech, coherent, moves all extremities spontaneously  Psych; no  hallucination, euthymic mood, not agitated        Data   Data reviewed today: I reviewed all medications, new labs and imaging results over the last 24 hours. I personally reviewed the EKG tracing showing As discussed above.    Recent Labs   Lab 05/21/22  1214   WBC 14.6*   HGB 15.0   MCV 98         POTASSIUM 3.5   CHLORIDE 105   CO2 20   BUN 10   CR 0.61   ANIONGAP 11   ROGERS 9.3   *   ALBUMIN 3.9   PROTTOTAL 7.8   BILITOTAL 0.5   ALKPHOS 64   *   *   LIPASE 248     Most Recent 3 CBC's:Recent Labs   Lab Test 05/21/22  1214 04/18/22  0750 04/11/21  0944   WBC 14.6* 5.5 7.5   HGB 15.0 13.8 14.5   MCV 98 100 99    282 331     Most Recent 3 BMP's:Recent Labs   Lab Test 05/21/22  1214 04/18/22  0750 04/11/21  0944    137 137   POTASSIUM 3.5 4.5 4.3   CHLORIDE 105 106 107   CO2 20 28 26   BUN 10 12 11   CR 0.61 0.53 0.56   ANIONGAP 11 3 4   ROGERS 9.3 8.9 8.8   * 112* 92     Most Recent 2 LFT's:Recent Labs   Lab Test 05/21/22  1214 04/18/22  0750   * 12   * 29   ALKPHOS 64 52   BILITOTAL 0.5 0.4     Most Recent 3 INR's:No lab results found.  Most Recent 6 glucoses:Recent Labs   Lab Test 05/21/22  1214 04/18/22  0750 04/11/21  0944 01/10/19  0823 04/10/17  0855 02/06/15  1002   * 112* 92 102* 89 88     Recent Results (from the past 24 hour(s))   CT Head w/o Contrast    Narrative    EXAM: CT HEAD WITHOUT CONTRAST  LOCATION: [facility]  DATE/TIME: 05/21/2022, 12:33 PM    INDICATION: Head trauma, moderate-severe.  COMPARISON: None.  TECHNIQUE: Routine CT Head without IV contrast. Multiplanar reformats. Dose reduction techniques were used.    FINDINGS:  INTRACRANIAL CONTENTS: No intracranial hemorrhage, extra-axial collection, or mass effect.  No CT evidence of acute infarct. Normal parenchymal attenuation. Normal ventricles and sulci. Corpus callosum is satisfactory. Position of the cerebellar tonsils   is normal. Sella shows no  acute abnormality. Nothing for subarachnoid, subdural or epidural hemorrhage is identified.    VISUALIZED ORBITS/SINUSES/MASTOIDS: No intraorbital abnormality. Mucosal thickening primarily involving the ethmoid air cells. Scattered fluid/membrane thickening in the left mastoid air cells. No apparent mass in the posterior nasopharynx or skull base.    BONES/SOFT TISSUES: No fracture of the calvarium or skull base. Overall mineralization is satisfactory. No significant swelling of the facial tissues with suggested very mild left parietal scalp swelling posteriorly.      Impression    IMPRESSION:  1.  No acute process intracranially.    2.  No fractures.    3.  Suggested very mild left posterior parietal scalp swelling.    4.  No extra-axial hemorrhage.     CT Chest/Abdomen/Pelvis w Contrast    Narrative    EXAM: CT CHEST/ABDOMEN/PELVIS W CONTRAST  LOCATION: [facility]  DATE/TIME: 5/21/2022 12:32 PM    INDICATION: Chest-abdomen-pelvis trauma, blunt. Pain. Shortness of breath.  COMPARISON: CT abdomen and pelvis 3/19/2018  TECHNIQUE: CT scan of the chest, abdomen, and pelvis was performed following injection of IV contrast. Multiplanar reformats were obtained. Dose reduction techniques were used.   CONTRAST: 82mL Isovue 370    FINDINGS:   LUNGS AND PLEURA: There is minimal linear atelectasis or scarring in the right middle lobe. No pneumothorax or pleural effusion.    MEDIASTINUM/AXILLAE: Normal.    CORONARY ARTERY CALCIFICATION: None.    HEPATOBILIARY: Normal.    PANCREAS: Normal.    SPLEEN: Normal.    ADRENAL GLANDS: Normal.    KIDNEYS/BLADDER: There is hypodense material in the perinephric space on the right compatible with a hematoma. This measures up to 6 mm in thickness on image 149 series 4. There are 2 foci of linear low attenuation within the upper pole of the right   kidney as seen on image 145 series 4 laterally and on image 55 series 8 posteriorly suspicious for small lacerations. A  rounded low-attenuation lesion in the anterior upper pole is seen most compatible with a benign cyst which does not need follow-up. No   hydronephrosis. No evidence of active extravasation of contrast to suggest active bleeding. Normal left kidney. Normal ureters and bladder.    BOWEL: Normal unopacified stomach and small bowel. Normal appendix. Scattered colonic diverticula without active diverticulitis.    LYMPH NODES: No lymphadenopathy.    VASCULATURE: Minimal atherosclerotic change of the aorta without aneurysm. Normal IVC. Patent renal veins.    PELVIC ORGANS: IUD in the uterus. Small cysts on the ovaries. No free fluid in the pelvis    MUSCULOSKELETAL: There are acute fractures of the right anterior sixth, seventh, and eighth ribs. There is an old incompletely united fracture of the right posterior 11th rib.      Impression    IMPRESSION:  1.  Right perinephric hemorrhage with 2 small linear foci in the upper pole cortex of the right kidney concerning for lacerations. These findings would be compatible with a grade 2 renal injury.  2.  Right sixth through eighth rib fractures.  3.  Colonic diverticulosis without active diverticulitis.    4.  Report was discussed with Dr. Reena Liu 5/21/2022 1315 hours.   Cervical spine CT w/o contrast    Narrative    EXAM: CT CERVICAL SPINE WITHOUT CONTRAST  LOCATION: [facility]  DATE/TIME: 05/21/2022, 12:33 PM    INDICATION: Neck trauma, dangerous injury mechanism (Age 16-64y). Neck pain.  COMPARISON: None.  TECHNIQUE: Routine CT Cervical Spine without IV contrast. Multiplanar reformats. Dose reduction techniques were used.    FINDINGS:  VERTEBRA: Satisfactory vertebral body height. Reversal of the expected cervical lordosis. Interspace narrowing/osteophytes C4-C5 and C5-C6. The articular pillars are intact. No evidence for facet joint space widening. No interspace widening. Satisfactory   cervical prevertebral soft tissues. Foramen magnum is  satisfactory. C1 ring is intact. Hyoid bone and cartilaginous structures of the neck are intact. No evidence for displaced upper rib fracture. Very mild leftward convexity cervical curve. No fracture   or posttraumatic subluxation.     CANAL/FORAMINA: Moderate canal compromise C4-C5 and C5-C6 due to generalized disc bulging. No severe spinal stenosis. Moderate to severe right C5-C6 foraminal stenosis with no additional severe foraminal narrowing.    PARASPINAL: Lung apices are clear. No focal fluid collections, mass or adenopathy noted within the neck soft tissues. Thyroid is satisfactory. No mediastinal adenopathy or hematoma. Mastoid air cells are clear.      Impression    IMPRESSION:  1.  No fracture or posttraumatic subluxation.  2.  No high-grade spinal canal, with moderate canal compromise and moderate-severe narrowing mid cervical spine.

## 2022-05-21 NOTE — ED NOTES
[facility]  ED Nurse Handoff Report    Sarahy Meng is a 46 year old female   ED Chief complaint: Fall  . ED Diagnosis:   Final diagnoses:   Closed fracture of multiple ribs of right side, initial encounter   Traumatic perinephric hematoma of right kidney, initial encounter   Laceration of right kidney, initial encounter   Closed head injury, initial encounter     Allergies:   Allergies   Allergen Reactions     No Known Drug Allergies        Code Status: Full Code  Activity level - Baseline/Home:  Independent. Activity Level - Current:   Assist X 2. Lift room needed: No. Bariatric: No   Needed: No   Isolation: No. Infection: Not Applicable.     Vital Signs:   Vitals:    05/21/22 1330 05/21/22 1345 05/21/22 1400 05/21/22 1415   BP:  (!) 172/110 (!) 164/106 (!) 143/98   Pulse: 85 94 86 85   Resp: 11 11 14 10   Temp: 97.7  F (36.5  C)      TempSrc: Temporal      SpO2: 98% 96% 97% 99%   Weight:           Cardiac Rhythm:  ,      Pain level:    Patient confused: No. Patient Falls Risk: Yes.   Elimination Status: Has voided   Patient Report - Initial Complaint: fall off horse. Focused Assessment: Sarahy Meng is a 46 year old female with history of arthritis and chronic back pain who presents with back and rib pain sustained from a horse riding accident. About 30 minutes ago (1130), the patient fell forward off of a horse and hit her right side on a fence. She also hit her head, but has no headache and did not lose consciousness. She was turned and made awkward contact, causing pain in her right ribs and central lower back region. Sarahy notes she feels light headed and is experiencing shortness of breath, but denies neck pain, abdominal pain or pain in her legs. She was not wearing a helmet. The patient is not on any blood thinners.      Review of Systems   Respiratory: Positive for shortness of breath.    Cardiovascular: Positive for chest pain (rib pain).   Gastrointestinal: Negative for  abdominal pain.   Musculoskeletal: Positive for back pain. Negative for arthralgias (leg pain ) and neck pain.   Neurological: Negative for syncope and headaches.   All other systems reviewed and are negative.  Tests Performed: xray. Abnormal Results:   Cervical spine CT w/o contrast   Final Result   IMPRESSION:   1.  No fracture or posttraumatic subluxation.   2.  No high-grade spinal canal, with moderate canal compromise and moderate-severe narrowing mid cervical spine.         CT Chest/Abdomen/Pelvis w Contrast   Final Result   IMPRESSION:   1.  Right perinephric hemorrhage with 2 small linear foci in the upper pole cortex of the right kidney concerning for lacerations. These findings would be compatible with a grade 2 renal injury.   2.  Right sixth through eighth rib fractures.   3.  Colonic diverticulosis without active diverticulitis.      4.  Report was discussed with Dr. Reena Liu 5/21/2022 1315 hours.      CT Head w/o Contrast   Final Result   IMPRESSION:   1.  No acute process intracranially.      2.  No fractures.      3.  Suggested very mild left posterior parietal scalp swelling.      4.  No extra-axial hemorrhage.           Labs Ordered and Resulted from Time of ED Arrival to Time of ED Departure   COMPREHENSIVE METABOLIC PANEL - Abnormal       Result Value    Sodium 136      Potassium 3.5      Chloride 105      Carbon Dioxide (CO2) 20      Anion Gap 11      Urea Nitrogen 10      Creatinine 0.61      Calcium 9.3      Glucose 145 (*)     Alkaline Phosphatase 64       (*)      (*)     Protein Total 7.8      Albumin 3.9      Bilirubin Total 0.5      GFR Estimate >90     CBC WITH PLATELETS AND DIFFERENTIAL - Abnormal    WBC Count 14.6 (*)     RBC Count 4.45      Hemoglobin 15.0      Hematocrit 43.4      MCV 98      MCH 33.7 (*)     MCHC 34.6      RDW 12.4      Platelet Count 306      % Neutrophils 76      % Lymphocytes 18      % Monocytes 4      % Eosinophils 1      % Basophils 0      %  Immature Granulocytes 1      NRBCs per 100 WBC 0      Absolute Neutrophils 11.1 (*)     Absolute Lymphocytes 2.6      Absolute Monocytes 0.6      Absolute Eosinophils 0.1      Absolute Basophils 0.0      Absolute Immature Granulocytes 0.1      Absolute NRBCs 0.0     LIPASE - Normal    Lipase 248     ISTAT HCG QUALITATIVE PREGNANCY POCT - Normal    HCG Qualitative POCT Negative     COVID-19 VIRUS (CORONAVIRUS) BY PCR - Normal    SARS CoV2 PCR Negative     ABO/RH TYPE AND SCREEN   .   Treatments provided: pain management   Family Comments: at bedside  OBS brochure/video discussed/provided to patient:  Yes  ED Medications:   Medications   ondansetron (ZOFRAN) injection 4 mg (4 mg Intravenous Given 5/21/22 1437)   morphine (PF) injection 4 mg (4 mg Intravenous Given 5/21/22 1436)   CT Scan Flush (61 mLs Intravenous Given 5/21/22 1236)   iopamidol (ISOVUE-370) solution 500 mL (82 mLs Intravenous Given 5/21/22 1236)     Drips infusing:  No  For the majority of the shift, the patient's behavior Green. Interventions performed were n/a.    Sepsis treatment initiated: No     Patient tested for COVID 19 prior to admission: YES    ED Nurse Name/Phone Number: Auerlio Marie RN,   3:23 PM  RECEIVING UNIT ED HANDOFF REVIEW    Above ED Nurse Handoff Report was reviewed: Yes  Reviewed by: Carmelita Aldrich RN on May 21, 2022 at 3:41 PM

## 2022-05-21 NOTE — PHARMACY-ADMISSION MEDICATION HISTORY
Admission medication history interview status for this patient is complete. See [facility] admission navigator for allergy information, prior to admission medications and immunization status.     Medication history interview done, indicate source(s): Patient  Medication history resources (including written lists, pill bottles, clinic record): [employer] and Care Everywhere  Pharmacy: [facility] in [place]    Changes made to PTA medication list:  Added: None  Changed: Phentermine 37.5mg tablets, 1 daily --> 1/2 tablet daily  Reported as Not Taking: None  Removed: Depo-provera injection    Actions taken by pharmacist (provider contacted, etc):None     Additional medication history information: Note: patient admits to tobacco smoking and expressed interest in using nicotine patches while here at hospital    Medication reconciliation/reorder completed by provider prior to medication history?  No    Prior to Admission medications    Medication Sig Last Dose Taking? Auth Provider   cyclobenzaprine (FLEXERIL) 10 MG tablet Take 1 tablet (10 mg) by mouth 3 times daily as needed for muscle spasms Past Month at Unknown time Yes Aaseby-Aguilera, Ramona Ann, PA-C   levonorgestrel (MIRENA) 20 MCG/24HR IUD 1 each (20 mcg) by Intrauterine route once  Yes Yasmine Crabtree MD   phentermine (ADIPEX-P) 37.5 MG tablet Take 1 tablet (37.5 mg) by mouth every morning (before breakfast)  Patient taking differently: Take 0.5 tablets by mouth every morning (before breakfast) 5/21/2022 at Unknown time Yes Aaseby-Aguilera, Ramona Ann, PA-C

## 2022-05-22 ENCOUNTER — APPOINTMENT (OUTPATIENT)
Dept: GENERAL RADIOLOGY | Facility: CLINIC | Age: 47
End: 2022-05-22
Attending: INTERNAL MEDICINE
Payer: COMMERCIAL

## 2022-05-22 ENCOUNTER — APPOINTMENT (OUTPATIENT)
Dept: PHYSICAL THERAPY | Facility: CLINIC | Age: 47
End: 2022-05-22
Attending: INTERNAL MEDICINE
Payer: COMMERCIAL

## 2022-05-22 VITALS
RESPIRATION RATE: 18 BRPM | SYSTOLIC BLOOD PRESSURE: 120 MMHG | OXYGEN SATURATION: 100 % | BODY MASS INDEX: 32.3 KG/M2 | TEMPERATURE: 98.6 F | WEIGHT: 164 LBS | HEART RATE: 58 BPM | DIASTOLIC BLOOD PRESSURE: 74 MMHG

## 2022-05-22 LAB
ALBUMIN SERPL-MCNC: 3.2 G/DL (ref 3.4–5)
ALP SERPL-CCNC: 53 U/L (ref 40–150)
ALT SERPL W P-5'-P-CCNC: 74 U/L (ref 0–50)
ANION GAP SERPL CALCULATED.3IONS-SCNC: 7 MMOL/L (ref 3–14)
AST SERPL W P-5'-P-CCNC: 48 U/L (ref 0–45)
BILIRUB SERPL-MCNC: 0.8 MG/DL (ref 0.2–1.3)
BUN SERPL-MCNC: 12 MG/DL (ref 7–30)
CALCIUM SERPL-MCNC: 8.7 MG/DL (ref 8.5–10.1)
CHLORIDE BLD-SCNC: 105 MMOL/L (ref 94–109)
CO2 SERPL-SCNC: 23 MMOL/L (ref 20–32)
CREAT SERPL-MCNC: 0.64 MG/DL (ref 0.52–1.04)
ERYTHROCYTE [DISTWIDTH] IN BLOOD BY AUTOMATED COUNT: 12.4 % (ref 10–15)
GFR SERPL CREATININE-BSD FRML MDRD: >90 ML/MIN/1.73M2
GLUCOSE BLD-MCNC: 105 MG/DL (ref 70–99)
HCT VFR BLD AUTO: 39.9 % (ref 35–47)
HGB BLD-MCNC: 13.1 G/DL (ref 11.7–15.7)
HGB BLD-MCNC: 13.3 G/DL (ref 11.7–15.7)
MAGNESIUM SERPL-MCNC: 2.3 MG/DL (ref 1.6–2.3)
MCH RBC QN AUTO: 34 PG (ref 26.5–33)
MCHC RBC AUTO-ENTMCNC: 33.3 G/DL (ref 31.5–36.5)
MCV RBC AUTO: 102 FL (ref 78–100)
PHOSPHATE SERPL-MCNC: 4.3 MG/DL (ref 2.5–4.5)
PLATELET # BLD AUTO: 268 10E3/UL (ref 150–450)
POTASSIUM BLD-SCNC: 3.8 MMOL/L (ref 3.4–5.3)
PROT SERPL-MCNC: 6.7 G/DL (ref 6.8–8.8)
RBC # BLD AUTO: 3.91 10E6/UL (ref 3.8–5.2)
SODIUM SERPL-SCNC: 135 MMOL/L (ref 133–144)
WBC # BLD AUTO: 8.3 10E3/UL (ref 4–11)

## 2022-05-22 PROCEDURE — 250N000013 HC RX MED GY IP 250 OP 250 PS 637: Performed by: INTERNAL MEDICINE

## 2022-05-22 PROCEDURE — 85027 COMPLETE CBC AUTOMATED: CPT | Performed by: INTERNAL MEDICINE

## 2022-05-22 PROCEDURE — 97530 THERAPEUTIC ACTIVITIES: CPT | Mod: GP

## 2022-05-22 PROCEDURE — 83735 ASSAY OF MAGNESIUM: CPT | Performed by: INTERNAL MEDICINE

## 2022-05-22 PROCEDURE — 36415 COLL VENOUS BLD VENIPUNCTURE: CPT | Performed by: INTERNAL MEDICINE

## 2022-05-22 PROCEDURE — 99239 HOSP IP/OBS DSCHRG MGMT >30: CPT | Performed by: STUDENT IN AN ORGANIZED HEALTH CARE EDUCATION/TRAINING PROGRAM

## 2022-05-22 PROCEDURE — 250N000013 HC RX MED GY IP 250 OP 250 PS 637: Performed by: STUDENT IN AN ORGANIZED HEALTH CARE EDUCATION/TRAINING PROGRAM

## 2022-05-22 PROCEDURE — 97161 PT EVAL LOW COMPLEX 20 MIN: CPT | Mod: GP

## 2022-05-22 PROCEDURE — 84100 ASSAY OF PHOSPHORUS: CPT | Performed by: INTERNAL MEDICINE

## 2022-05-22 PROCEDURE — 71045 X-RAY EXAM CHEST 1 VIEW: CPT

## 2022-05-22 PROCEDURE — 80053 COMPREHEN METABOLIC PANEL: CPT | Performed by: INTERNAL MEDICINE

## 2022-05-22 PROCEDURE — 999N000157 HC STATISTIC RCP TIME EA 10 MIN

## 2022-05-22 RX ORDER — GABAPENTIN 300 MG/1
300 CAPSULE ORAL 3 TIMES DAILY
Qty: 30 CAPSULE | Refills: 0 | Status: SHIPPED | OUTPATIENT
Start: 2022-05-22 | End: 2022-06-03

## 2022-05-22 RX ORDER — NICOTINE 21 MG/24HR
1 PATCH, TRANSDERMAL 24 HOURS TRANSDERMAL DAILY
Status: DISCONTINUED | OUTPATIENT
Start: 2022-05-22 | End: 2022-05-22 | Stop reason: HOSPADM

## 2022-05-22 RX ORDER — AMOXICILLIN 250 MG
1-2 CAPSULE ORAL 2 TIMES DAILY PRN
Qty: 30 TABLET | Refills: 0 | Status: SHIPPED | OUTPATIENT
Start: 2022-05-22 | End: 2024-02-14

## 2022-05-22 RX ORDER — LIDOCAINE 4 G/G
1 PATCH TOPICAL EVERY 24 HOURS
Qty: 10 PATCH | Refills: 0 | Status: SHIPPED | OUTPATIENT
Start: 2022-05-22 | End: 2022-06-03

## 2022-05-22 RX ORDER — ACETAMINOPHEN 500 MG
1000 TABLET ORAL EVERY 6 HOURS PRN
Qty: 100 TABLET | Refills: 0 | Status: SHIPPED | OUTPATIENT
Start: 2022-05-22

## 2022-05-22 RX ORDER — IBUPROFEN 600 MG/1
600 TABLET, FILM COATED ORAL EVERY 6 HOURS PRN
Qty: 100 TABLET | Refills: 0 | Status: SHIPPED | OUTPATIENT
Start: 2022-05-22

## 2022-05-22 RX ORDER — METHOCARBAMOL 500 MG/1
500 TABLET, FILM COATED ORAL EVERY 6 HOURS PRN
Qty: 40 TABLET | Refills: 0 | Status: SHIPPED | OUTPATIENT
Start: 2022-05-22 | End: 2024-02-14

## 2022-05-22 RX ORDER — OXYCODONE HYDROCHLORIDE 5 MG/1
5 TABLET ORAL EVERY 4 HOURS PRN
Qty: 30 TABLET | Refills: 0 | Status: SHIPPED | OUTPATIENT
Start: 2022-05-22 | End: 2022-06-03

## 2022-05-22 RX ADMIN — OXYCODONE HYDROCHLORIDE 5 MG: 5 TABLET ORAL at 06:42

## 2022-05-22 RX ADMIN — GABAPENTIN 300 MG: 300 CAPSULE ORAL at 08:44

## 2022-05-22 RX ADMIN — OXYCODONE HYDROCHLORIDE 5 MG: 5 TABLET ORAL at 12:48

## 2022-05-22 RX ADMIN — ACETAMINOPHEN 975 MG: 325 TABLET, FILM COATED ORAL at 00:10

## 2022-05-22 RX ADMIN — OXYCODONE HYDROCHLORIDE 5 MG: 5 TABLET ORAL at 02:24

## 2022-05-22 RX ADMIN — ACETAMINOPHEN 975 MG: 325 TABLET, FILM COATED ORAL at 07:39

## 2022-05-22 RX ADMIN — NICOTINE 1 PATCH: 14 PATCH, EXTENDED RELEASE TRANSDERMAL at 08:42

## 2022-05-22 ASSESSMENT — ACTIVITIES OF DAILY LIVING (ADL)
ADLS_ACUITY_SCORE: 24

## 2022-05-22 NOTE — PROGRESS NOTES
[facility]  General Surgery Progress Note           Assessment and Plan:   Assessment/Plan:    Sarahy Meng is a 46 year old female who presents after falling from a horse.     Acute traumatic injuries include:    1. Rib fractures: Right #6-8  - Stable for discharge home today. She should continue:     - IS and Acapella performed by patient Q1H while awake     - Pain control with ice packs, lidoderm patch, scheduled tylenol, ibuprofen, gabapentin, PRN oxycodone, PRN robaxin     - Stool softeners while using narcotics    2. Grade 2 right kidney injury with perinephric hematoma, no evidence of active extravasation  - hgb stable, no further follow-up needed     Patient should follow up with her PCP post-hospitalization     Starr Barber MD         Interval History:   Pain is manageable on PO medications, hasn't used IV for nearly 24 hours.  Has been ambulating in halls and to restroom.         Physical Exam:   Blood pressure 120/74, pulse 58, temperature 98.6  F (37  C), temperature source Oral, resp. rate 18, weight 74.4 kg (164 lb), last menstrual period 04/07/2022, SpO2 100 %, not currently breastfeeding.    I/O last 3 completed shifts:  In: 240 [P.O.:240]  Out: 1850 [Urine:1850]    Constitutional:   awake, alert, cooperative, no apparent distress, and appears stated age     Back:   Stable ecchymosis of right flank     Lungs:   No increased work of breathing, good air exchange             Data:     Recent Labs   Lab 05/22/22  0556 05/21/22  2350 05/21/22  2132 05/21/22  1214   WBC 8.3  --   --  14.6*   HGB 13.3 13.1 13.4 15.0   HCT 39.9  --   --  43.4   *  --   --  98     --   --  306     Recent Labs   Lab 05/22/22  0556 05/21/22  1614 05/21/22  1214     --  136   POTASSIUM 3.8 4.1 3.5   CHLORIDE 105  --  105   CO2 23  --  20   ANIONGAP 7  --  11   *  --  145*   BUN 12  --  10   CR 0.64  --  0.61   GFRESTIMATED >90  --  >90   ROGERS 8.7  --  9.3   MAG 2.3  --   --    PHOS 4.3   --   --    PROTTOTAL 6.7*  --  7.8   ALBUMIN 3.2*  --  3.9   BILITOTAL 0.8  --  0.5   ALKPHOS 53  --  64   AST 48*  --  121*   ALT 74*  --  115*     Results for orders placed or performed during the hospital encounter of 05/21/22   CT Head w/o Contrast    Impression    IMPRESSION:  1.  No acute process intracranially.    2.  No fractures.    3.  Suggested very mild left posterior parietal scalp swelling.    4.  No extra-axial hemorrhage.     CT Chest/Abdomen/Pelvis w Contrast    Impression    IMPRESSION:  1.  Right perinephric hemorrhage with 2 small linear foci in the upper pole cortex of the right kidney concerning for lacerations. These findings would be compatible with a grade 2 renal injury.  2.  Right sixth through eighth rib fractures.  3.  Colonic diverticulosis without active diverticulitis.    4.  Report was discussed with Dr. Reena Liu 5/21/2022 1315 hours.   Cervical spine CT w/o contrast    Impression    IMPRESSION:  1.  No fracture or posttraumatic subluxation.  2.  No high-grade spinal canal, with moderate canal compromise and moderate-severe narrowing mid cervical spine.     XR Chest 1 View    Impression    IMPRESSION: Mild atelectasis in both lungs. No pneumothorax. Lungs otherwise clear. The right anterior sixth seventh and eighth rib nondisplaced fractures seen on CT are not appreciated by plain film.         Starr Barber MD

## 2022-05-22 NOTE — PROGRESS NOTES
Pt able to achieve 1500 + on IS , Flutter valve resistance +5 . BS clear. Pt ambulating in room .    Independent in use of IS and Flutter.

## 2022-05-22 NOTE — PLAN OF CARE
VSS on RA. A/Ox4. 6/10 R flank pain, pain meds given (see MAR) and ice pack utilized.  Nicotine patch placed on L arm. Hematoma to R flank noted. CXR done in AM (see results). LS clear, shallow breathing at times d/t pain. IS and flutter valve at bedside, self-administers. Went over AVS w/ pt and her . Pt had no questions and verbalized understanding of AVS. Was sent home w/ IS, flutter valve and discharge meds.  PIV removed.  Was brought down via W/C,  will transport home.

## 2022-05-22 NOTE — PLAN OF CARE
Physical Therapy Discharge Summary    Reason for therapy discharge:    Discharged to home.    Progress towards therapy goal(s). See goals on Care Plan in [facility] electronic health record for goal details.  Goals partially met.  Barriers to achieving goals:   discharge from facility.    Therapy recommendation(s):    No further therapy is recommended.

## 2022-05-22 NOTE — DISCHARGE SUMMARY
[facility]  Hospitalist Discharge Summary      Date of Admission:  5/21/2022  Date of Discharge:  5/22/2022  1:54 PM  Discharging Provider: Lefty Velásquez MD  Discharge Service: Hospitalist Service    Discharge Diagnoses   #1 fall from horse riding event  #2  Multiple rib fractures, right 6-8 rib fractures secondary from #1  #3 perinephric hemorrhage secondary to #1  #4 Transminitis likely from recent fall  #5 tobacco abuser    Follow-ups Needed After Discharge   Follow-up Appointments     Follow-up and recommended labs and tests       Follow up with primary care provider, Ramona Ann Aaseby-Aguilera, within   1-2 weeks for hospital follow- up.           Discharge Disposition   Discharged to home  Condition at discharge: Stable    Hospital Course   Sarahy Meng is a 46 year old female admitted on 5/21/2022. She presented here with a fall event apparently from a horse riding incident. She was found to have fracture of ribs 6-8 on right, which was treated conservatively with pain control. Surgery did not feel operative management was necessary. Tertiary exam negative. Also with perinephric hemorrhage related to fall, leading to trace hematuria. Hgb stable with no evidence of extravasation. Patient discharged home in stable condition with plans to follow up with PCP.    Consultations This Hospital Stay   PHYSICAL THERAPY ADULT IP CONSULT  SURGERY GENERAL IP CONSULT  OCCUPATIONAL THERAPY ADULT IP CONSULT    Code Status   Full Code    Time Spent on this Encounter   I, Lefty Velásquez MD, personally saw the patient today and spent greater than 30 minutes discharging this patient.       Lefty Velásquez MD  [facility] MEDICAL SURGICAL  [address] [place] [address]  Phone: [phone]  Fax: [phone]  ______________________________________________________________________    Physical Exam   Vital Signs: Temp: 98.6  F (37  C) Temp src: Oral BP: 120/74 Pulse: 58   Resp: 18 SpO2: 100 %  O2 Device: None (Room air)    Weight: 164 lbs 0 oz    General: Pleasant middle-aged female resting comfortably in hospital bed. Awake, alert, interactive.  HEENT: PERRL, EOMi, conjunctiva clear, sclera anicteric. NC/AT. MMM.  Cardiac: RRR without murmur, gallop or rub.  Respiratory: CTAB without wheezing, rales or rhonchi.  Abdominal: Soft, nontender.  Neurologic: Alert and oriented x4. CN II through XII grossly intact.  Psychiatric: Appropriate mood and affect.       Primary Care Physician   Ramona Ann Aaseby-Aguilera    Discharge Orders      Reason for your hospital stay    You were in the hospital for pain control related to broken ribs. Also have small hemorrhage near your kidney.     Follow-up and recommended labs and tests     Follow up with primary care provider, Ramona Ann Aaseby-Aguilera, within 1-2 weeks for hospital follow- up.     Activity    Your activity upon discharge: activity as tolerated     Incentive Spirometry    Continue to use incentive spirometer 8 times a day Until deep breathing is completely pain free without the need to splint     Flutter Valve    Continue to use flutter valve 8 times a day Until deep breathing is completely pain free without the need to splint     Diet    Follow this diet upon discharge: Orders Placed This Encounter      Regular Diet Adult       Significant Results and Procedures   Most Recent 3 CBC's:Recent Labs   Lab Test 05/22/22  0556 05/21/22  2350 05/21/22  2132 05/21/22  1214 04/18/22  0750   WBC 8.3  --   --  14.6* 5.5   HGB 13.3 13.1 13.4 15.0 13.8   *  --   --  98 100     --   --  306 282     Most Recent 3 BMP's:Recent Labs   Lab Test 05/22/22  0556 05/21/22  1614 05/21/22  1214 04/18/22  0750     --  136 137   POTASSIUM 3.8 4.1 3.5 4.5   CHLORIDE 105  --  105 106   CO2 23  --  20 28   BUN 12  --  10 12   CR 0.64  --  0.61 0.53   ANIONGAP 7  --  11 3   ROGERS 8.7  --  9.3 8.9   *  --  145* 112*     Most Recent 2 LFT's:Recent Labs   Lab  Test 05/22/22  0556 05/21/22  1214   AST 48* 121*   ALT 74* 115*   ALKPHOS 53 64   BILITOTAL 0.8 0.5   ,   Results for orders placed or performed during the hospital encounter of 05/21/22   CT Head w/o Contrast    Narrative    EXAM: CT HEAD WITHOUT CONTRAST  LOCATION: [facility]  DATE/TIME: 05/21/2022, 12:33 PM    INDICATION: Head trauma, moderate-severe.  COMPARISON: None.  TECHNIQUE: Routine CT Head without IV contrast. Multiplanar reformats. Dose reduction techniques were used.    FINDINGS:  INTRACRANIAL CONTENTS: No intracranial hemorrhage, extra-axial collection, or mass effect.  No CT evidence of acute infarct. Normal parenchymal attenuation. Normal ventricles and sulci. Corpus callosum is satisfactory. Position of the cerebellar tonsils   is normal. Sella shows no acute abnormality. Nothing for subarachnoid, subdural or epidural hemorrhage is identified.    VISUALIZED ORBITS/SINUSES/MASTOIDS: No intraorbital abnormality. Mucosal thickening primarily involving the ethmoid air cells. Scattered fluid/membrane thickening in the left mastoid air cells. No apparent mass in the posterior nasopharynx or skull base.    BONES/SOFT TISSUES: No fracture of the calvarium or skull base. Overall mineralization is satisfactory. No significant swelling of the facial tissues with suggested very mild left parietal scalp swelling posteriorly.      Impression    IMPRESSION:  1.  No acute process intracranially.    2.  No fractures.    3.  Suggested very mild left posterior parietal scalp swelling.    4.  No extra-axial hemorrhage.     CT Chest/Abdomen/Pelvis w Contrast    Narrative    EXAM: CT CHEST/ABDOMEN/PELVIS W CONTRAST  LOCATION: [facility]  DATE/TIME: 5/21/2022 12:32 PM    INDICATION: Chest-abdomen-pelvis trauma, blunt. Pain. Shortness of breath.  COMPARISON: CT abdomen and pelvis 3/19/2018  TECHNIQUE: CT scan of the chest, abdomen, and pelvis was performed following injection  of IV contrast. Multiplanar reformats were obtained. Dose reduction techniques were used.   CONTRAST: 82mL Isovue 370    FINDINGS:   LUNGS AND PLEURA: There is minimal linear atelectasis or scarring in the right middle lobe. No pneumothorax or pleural effusion.    MEDIASTINUM/AXILLAE: Normal.    CORONARY ARTERY CALCIFICATION: None.    HEPATOBILIARY: Normal.    PANCREAS: Normal.    SPLEEN: Normal.    ADRENAL GLANDS: Normal.    KIDNEYS/BLADDER: There is hypodense material in the perinephric space on the right compatible with a hematoma. This measures up to 6 mm in thickness on image 149 series 4. There are 2 foci of linear low attenuation within the upper pole of the right   kidney as seen on image 145 series 4 laterally and on image 55 series 8 posteriorly suspicious for small lacerations. A rounded low-attenuation lesion in the anterior upper pole is seen most compatible with a benign cyst which does not need follow-up. No   hydronephrosis. No evidence of active extravasation of contrast to suggest active bleeding. Normal left kidney. Normal ureters and bladder.    BOWEL: Normal unopacified stomach and small bowel. Normal appendix. Scattered colonic diverticula without active diverticulitis.    LYMPH NODES: No lymphadenopathy.    VASCULATURE: Minimal atherosclerotic change of the aorta without aneurysm. Normal IVC. Patent renal veins.    PELVIC ORGANS: IUD in the uterus. Small cysts on the ovaries. No free fluid in the pelvis    MUSCULOSKELETAL: There are acute fractures of the right anterior sixth, seventh, and eighth ribs. There is an old incompletely united fracture of the right posterior 11th rib.      Impression    IMPRESSION:  1.  Right perinephric hemorrhage with 2 small linear foci in the upper pole cortex of the right kidney concerning for lacerations. These findings would be compatible with a grade 2 renal injury.  2.  Right sixth through eighth rib fractures.  3.  Colonic diverticulosis without active  diverticulitis.    4.  Report was discussed with Dr. Reena Liu 5/21/2022 1315 hours.   Cervical spine CT w/o contrast    Narrative    EXAM: CT CERVICAL SPINE WITHOUT CONTRAST  LOCATION: [facility]  DATE/TIME: 05/21/2022, 12:33 PM    INDICATION: Neck trauma, dangerous injury mechanism (Age 16-64y). Neck pain.  COMPARISON: None.  TECHNIQUE: Routine CT Cervical Spine without IV contrast. Multiplanar reformats. Dose reduction techniques were used.    FINDINGS:  VERTEBRA: Satisfactory vertebral body height. Reversal of the expected cervical lordosis. Interspace narrowing/osteophytes C4-C5 and C5-C6. The articular pillars are intact. No evidence for facet joint space widening. No interspace widening. Satisfactory   cervical prevertebral soft tissues. Foramen magnum is satisfactory. C1 ring is intact. Hyoid bone and cartilaginous structures of the neck are intact. No evidence for displaced upper rib fracture. Very mild leftward convexity cervical curve. No fracture   or posttraumatic subluxation.     CANAL/FORAMINA: Moderate canal compromise C4-C5 and C5-C6 due to generalized disc bulging. No severe spinal stenosis. Moderate to severe right C5-C6 foraminal stenosis with no additional severe foraminal narrowing.    PARASPINAL: Lung apices are clear. No focal fluid collections, mass or adenopathy noted within the neck soft tissues. Thyroid is satisfactory. No mediastinal adenopathy or hematoma. Mastoid air cells are clear.      Impression    IMPRESSION:  1.  No fracture or posttraumatic subluxation.  2.  No high-grade spinal canal, with moderate canal compromise and moderate-severe narrowing mid cervical spine.     XR Chest 1 View    Narrative    EXAM: XR CHEST 1 VIEW  LOCATION: [facility]  DATE/TIME: 5/22/2022 6:36 AM    INDICATION: Trauma Patient with Rib Fracture(s)  COMPARISON: CT 05/21/2022      Impression    IMPRESSION: Mild atelectasis in both lungs. No pneumothorax.  Lungs otherwise clear. The right anterior sixth seventh and eighth rib nondisplaced fractures seen on CT are not appreciated by plain film.       Discharge Medications   Discharge Medication List as of 5/22/2022  1:19 PM      START taking these medications    Details   acetaminophen (TYLENOL) 500 MG tablet Take 2 tablets (1,000 mg) by mouth every 6 hours as needed for mild pain, Disp-100 tablet, R-0, E-Prescribe      gabapentin (NEURONTIN) 300 MG capsule Take 1 capsule (300 mg) by mouth 3 times daily, Disp-30 capsule, R-0, E-Prescribe      ibuprofen (ADVIL/MOTRIN) 600 MG tablet Take 1 tablet (600 mg) by mouth every 6 hours as needed (mild to moderate pain), Disp-100 tablet, R-0, E-Prescribe      Lidocaine (LIDOCARE) 4 % Patch Place 1 patch onto the skin every 24 hours To prevent lidocaine toxicity, patient should be patch free for 12 hrs daily.Disp-10 patch, [id number]      methocarbamol (ROBAXIN) 500 MG tablet Take 1 tablet (500 mg) by mouth every 6 hours as needed for muscle spasms, Disp-40 tablet, R-0, E-Prescribe      oxyCODONE (ROXICODONE) 5 MG tablet Take 1 tablet (5 mg) by mouth every 4 hours as needed for moderate to severe pain, Disp-30 tablet, R-0, E-Prescribe      senna-docusate (SENOKOT-S/PERICOLACE) 8.6-50 MG tablet Take 1-2 tablets by mouth 2 times daily as needed for constipation, Disp-30 tablet, R-0, E-Prescribe         CONTINUE these medications which have NOT CHANGED    Details   cyclobenzaprine (FLEXERIL) 10 MG tablet Take 1 tablet (10 mg) by mouth 3 times daily as needed for muscle spasms, Disp-30 tablet, R-1, E-Prescribe      levonorgestrel (MIRENA) 20 MCG/24HR IUD 1 each (20 mcg) by Intrauterine route onceHistorical      phentermine (ADIPEX-P) 37.5 MG tablet Take 1 tablet (37.5 mg) by mouth every morning (before breakfast), Disp-30 tablet, R-1, E-Prescribe           Allergies   Allergies   Allergen Reactions     No Known Drug Allergies

## 2022-05-22 NOTE — PROGRESS NOTES
05/22/22 0900   Quick Adds   Type of Visit Initial PT Evaluation   Living Environment   People in Home spouse   Current Living Arrangements house   Transportation Anticipated family or friend will provide   Living Environment Comments pt lives in house [place] and stairs within home   Self-Care   Usual Activity Tolerance good   Current Activity Tolerance fair   Activity/Exercise/Self-Care Comment pt IND at baseline   General Information   Onset of Illness/Injury or Date of Surgery 05/21/22   Referring Physician Ernst Caldwell MD   Patient/Family Therapy Goals Statement (PT) To go home   Pertinent History of Current Problem (include personal factors and/or comorbidities that impact the POC) Sarahy Meng is a 46 year old female with history of arthritis and chronic back pain who presents with back and rib pain sustained from a horse riding accident. About 30 minutes ago (1130), the patient fell forward off of a horse and hit her right side on a fence. She also hit her head, but has no headache and did not lose consciousness. She was turned and made awkward contact, causing pain in her right ribs and central lower back region. Sarahy notes she feels light headed and is experiencing shortness of breath, but denies neck pain, abdominal pain or pain in her legs. She was not wearing a helmet. The patient is not on any blood thinners.   Existing Precautions/Restrictions no known precautions/restrictions   Cognition   Affect/Mental Status (Cognition) WNL   Pain Assessment   Patient Currently in Pain Yes, see Vital Sign flowsheet  (R sided rib pain)   Posture    Posture Forward head position   Range of Motion (ROM)   ROM Comment WFL   Strength (Manual Muscle Testing)   Strength Comments WFL   Bed Mobility   Comment, (Bed Mobility) SBA cues for improved comfort   Transfers   Comment, (Transfers) CGA STS from EOB no AD   Gait/Stairs (Locomotion)   Comment, (Gait/Stairs) Pt ambulates with no AD, CGA, slow baudilio,  guarded   Balance   Balance Comments impaired high level balance   Clinical Impression   Criteria for Skilled Therapeutic Intervention Yes, treatment indicated   PT Diagnosis (PT) impaired IND with mobility from baseline   Influenced by the following impairments Pain   Functional limitations due to impairments Impaired IND with bed mobility, transfers, gait, stair navigation   Clinical Presentation (PT Evaluation Complexity) Stable/Uncomplicated   Clinical Presentation Rationale clinical judgement   Clinical Decision Making (Complexity) low complexity   Planned Therapy Interventions (PT) bed mobility training;cryotherapy;gait training;home exercise program;stair training;transfer training   Risk & Benefits of therapy have been explained evaluation/treatment results reviewed;care plan/treatment goals reviewed;risks/benefits reviewed;patient   PT Discharge Planning   PT Discharge Recommendation (DC Rec) home with assist   PT Rationale for DC Rec eval complete, family present and supportive. Pt educated on use of pillow for bracing when performing bed mobility. Edu importance of ambulating and up to chair for meals. Pt overall mobilizing CGA-SBA no AD, denied dizziness. No LOB with OOB mobility. Declining stair trial. Pt most limited by pain.  can provide CGA at DC. Recommend DC to home with assist for household tasks. Will continue to follow during IP stay, no skilled PT needs at DC   Total Evaluation Time   Total Evaluation Time (Minutes) 15   Physical Therapy Goals   PT Frequency Daily   PT Goals Bed Mobility;Transfers;Gait;Stairs   PT: Bed Mobility Independent   PT: Transfers Independent   PT: Gait Independent;Greater than 200 feet   PT: Stairs Modified independent;Greater than 10 stairs;Rail on left

## 2022-05-22 NOTE — CONSULTS
"[facility]   Trauma Surgical Consultation           Assessment and Plan:   Assessment/Plan:   Sarahy Meng is a 46 year old female who presents after falling from a horse.    Acute traumatic injuries include:    1. Rib fractures: Right #6-8  - Ambulate 3x daily and out of bed to chair daily  - Elevate HOB 30 degrees  - Encourage PO intake and notify provider if <60% of meals eaten  - Pain assessments and IS assessment Q2H while awake, notify provider if pain >7 or IS volume less than 1200 ml/kg. If patient cannot acheive this volume: CPAP treatment  - IS and Acapella performed by patient Q1H while awake  - Continuous pulse ox  - Pain control with ice packs, lidoderm patch, scheduled tylenol, ibuprofen, oxycodone, IV dilaudid  - Daily CXR  - PT, OT, and nutition consults    2. Grade 2 right kidney injury with perinephric hematoma, no evidence of active extravasation  - Unlikely to require intervention  - Q4 vital signs  - Repeat hgb in AM    Chronic medical condtions include:  1. Hypothyroidism     Will be ready for discharge when   - Pain controlled on oral medications     - IS > 1500 ml  - Adequate mobility   - PT/OT Evaluations complete and has safe disposition   - Oxygenation at baseline   - Hgb and vitals stable  - Likely in 1-2 days     Starr Barber MD          Chief Complaint:   Fall from horse, right flank pain     History is obtained from the patient.         History of Present Illness:   Sarahy Meng is a 46 year old female who presented to the ED after falling from a horse while riding. The horse was running too fast and she slid off the right side of the horse. The fall was witnessed by her . He describes that she hit a fence post on the way down with her right flank, then twisted and her her left head on the fence, then landed on dirt. She had the \"wind knocked out of her\" but remained conscious the entire time. She laid flat on the ground for several minutes, then was helped " into a private vehicle and was driven to the ED.     Complains of right flank pain.  Denies shortness of breath, chest pain, abdominal pain, nausea, emesis, visual changes, headache, neck pain, back pain, extremity pain.        Negative loss of consciousnes.  EtOH use immediately prior to incident:  No  Illicit drug use immediately prior to incident:  No  Anticoagulation:  No     History of syncope:  No  History of falls:  No  At baseline ambulates independently.           Past Medical History:    has a past medical history of Contact dermatitis and other eczema, due to unspecified cause, Depressive disorder, not elsewhere classified, and Esophageal reflux.          Past Surgical History:     Past Surgical History:   Procedure Laterality Date     [facility] NONSPECIFIC PROCEDURE       - boy 89     [facility] NONSPECIFIC PROCEDURE      R. [employer]             Social History:     Social History     Tobacco Use     Smoking status: Current Every Day Smoker     Years: 15.00     Types: Cigarettes     Smokeless tobacco: Never Used     Tobacco comment: 6 ciggs a day   Substance Use Topics     Alcohol use: Yes     Comment: socially             Family History:   Family history reviewed and not pertinent.         Allergies:     Allergies   Allergen Reactions     No Known Drug Allergies              Medications:   No current facility-administered medications on file prior to encounter.  cyclobenzaprine (FLEXERIL) 10 MG tablet, Take 1 tablet (10 mg) by mouth 3 times daily as needed for muscle spasms  levonorgestrel (MIRENA) 20 MCG/24HR IUD, 1 each (20 mcg) by Intrauterine route once  phentermine (ADIPEX-P) 37.5 MG tablet, Take 1 tablet (37.5 mg) by mouth every morning (before breakfast) (Patient taking differently: Take 0.5 tablets by mouth every morning (before breakfast))        acetaminophen  975 mg Oral Q8H     gabapentin  300 mg Oral TID     lidocaine  1 patch Transdermal Q24H     lidocaine   Transdermal Q8H     sodium  chloride (PF)  3 mL Intracatheter Q8H            Review of Systems:   The 10 point review of systems is negative other than noted in the HPI.           Physical Exam:   BP (!) 150/82 (BP Location: Right arm)   Pulse 78   Temp 97.1  F (36.2  C) (Oral)   Resp 18   Wt 74.4 kg (164 lb)   LMP 04/07/2022 (Approximate)   SpO2 95%   BMI 32.30 kg/m    General appearance: well-nourished, no apparent distress  HEENT: Pupils are equal and round.  Head is normocephalic and atraumatic.  Neck is without thyromegaly, masses, swelling, ecchymosis. No tenderness on palpation and normal ROM  Chest:  Clear to auscultation bilaterally, can perform IS to ~ 1400 ml.  Heart with regular rate and rhythm, no murmurs.  Ecchymosis and palpable swelling of right flank. no crepitus, no visible deformity.  Abdomen:  Nondistended, soft, nontender to palpation.  No masses.  Back: no palpable masses or visible deformity. Non tender along midline spine.  Extremities: Strength normal  Neurologic: nonfocal, grossly intact times four extremities, alert and oriented times three.  Cranial nerves II through XII intact grossly.  Psychiatric: mood and affect are appropriate.  Skin: without jaundice, lesions, rashes.  Ecchymosis as noted above.           Data:   WBC -   WBC   Date Value Ref Range Status   04/11/2021 7.5 4.0 - 11.0 10e9/L Final     WBC Count   Date Value Ref Range Status   05/21/2022 14.6 (H) 4.0 - 11.0 10e3/uL Final   ], HgB -   Hemoglobin   Date Value Ref Range Status   05/21/2022 15.0 11.7 - 15.7 g/dL Final   04/11/2021 14.5 11.7 - 15.7 g/dL Final   ]   Liver Function Studies -   Recent Labs   Lab Test 05/21/22  1214   PROTTOTAL 7.8   ALBUMIN 3.9   BILITOTAL 0.5   ALKPHOS 64   *   *         IMAGING:  Results for orders placed or performed during the hospital encounter of 05/21/22   CT Head w/o Contrast    Narrative    EXAM: CT HEAD WITHOUT CONTRAST  LOCATION: [facility]  DATE/TIME: 05/21/2022,  12:33 PM    INDICATION: Head trauma, moderate-severe.  COMPARISON: None.  TECHNIQUE: Routine CT Head without IV contrast. Multiplanar reformats. Dose reduction techniques were used.    FINDINGS:  INTRACRANIAL CONTENTS: No intracranial hemorrhage, extra-axial collection, or mass effect.  No CT evidence of acute infarct. Normal parenchymal attenuation. Normal ventricles and sulci. Corpus callosum is satisfactory. Position of the cerebellar tonsils   is normal. Sella shows no acute abnormality. Nothing for subarachnoid, subdural or epidural hemorrhage is identified.    VISUALIZED ORBITS/SINUSES/MASTOIDS: No intraorbital abnormality. Mucosal thickening primarily involving the ethmoid air cells. Scattered fluid/membrane thickening in the left mastoid air cells. No apparent mass in the posterior nasopharynx or skull base.    BONES/SOFT TISSUES: No fracture of the calvarium or skull base. Overall mineralization is satisfactory. No significant swelling of the facial tissues with suggested very mild left parietal scalp swelling posteriorly.      Impression    IMPRESSION:  1.  No acute process intracranially.    2.  No fractures.    3.  Suggested very mild left posterior parietal scalp swelling.    4.  No extra-axial hemorrhage.     CT Chest/Abdomen/Pelvis w Contrast    Narrative    EXAM: CT CHEST/ABDOMEN/PELVIS W CONTRAST  LOCATION: [facility]  DATE/TIME: 5/21/2022 12:32 PM    INDICATION: Chest-abdomen-pelvis trauma, blunt. Pain. Shortness of breath.  COMPARISON: CT abdomen and pelvis 3/19/2018  TECHNIQUE: CT scan of the chest, abdomen, and pelvis was performed following injection of IV contrast. Multiplanar reformats were obtained. Dose reduction techniques were used.   CONTRAST: 82mL Isovue 370    FINDINGS:   LUNGS AND PLEURA: There is minimal linear atelectasis or scarring in the right middle lobe. No pneumothorax or pleural effusion.    MEDIASTINUM/AXILLAE: Normal.    CORONARY ARTERY CALCIFICATION:  None.    HEPATOBILIARY: Normal.    PANCREAS: Normal.    SPLEEN: Normal.    ADRENAL GLANDS: Normal.    KIDNEYS/BLADDER: There is hypodense material in the perinephric space on the right compatible with a hematoma. This measures up to 6 mm in thickness on image 149 series 4. There are 2 foci of linear low attenuation within the upper pole of the right   kidney as seen on image 145 series 4 laterally and on image 55 series 8 posteriorly suspicious for small lacerations. A rounded low-attenuation lesion in the anterior upper pole is seen most compatible with a benign cyst which does not need follow-up. No   hydronephrosis. No evidence of active extravasation of contrast to suggest active bleeding. Normal left kidney. Normal ureters and bladder.    BOWEL: Normal unopacified stomach and small bowel. Normal appendix. Scattered colonic diverticula without active diverticulitis.    LYMPH NODES: No lymphadenopathy.    VASCULATURE: Minimal atherosclerotic change of the aorta without aneurysm. Normal IVC. Patent renal veins.    PELVIC ORGANS: IUD in the uterus. Small cysts on the ovaries. No free fluid in the pelvis    MUSCULOSKELETAL: There are acute fractures of the right anterior sixth, seventh, and eighth ribs. There is an old incompletely united fracture of the right posterior 11th rib.      Impression    IMPRESSION:  1.  Right perinephric hemorrhage with 2 small linear foci in the upper pole cortex of the right kidney concerning for lacerations. These findings would be compatible with a grade 2 renal injury.  2.  Right sixth through eighth rib fractures.  3.  Colonic diverticulosis without active diverticulitis.    4.  Report was discussed with Dr. Reena Liu 5/21/2022 1315 hours.   Cervical spine CT w/o contrast    Narrative    EXAM: CT CERVICAL SPINE WITHOUT CONTRAST  LOCATION: [facility]  DATE/TIME: 05/21/2022, 12:33 PM    INDICATION: Neck trauma, dangerous injury mechanism (Age 16-64y). Neck  pain.  COMPARISON: None.  TECHNIQUE: Routine CT Cervical Spine without IV contrast. Multiplanar reformats. Dose reduction techniques were used.    FINDINGS:  VERTEBRA: Satisfactory vertebral body height. Reversal of the expected cervical lordosis. Interspace narrowing/osteophytes C4-C5 and C5-C6. The articular pillars are intact. No evidence for facet joint space widening. No interspace widening. Satisfactory   cervical prevertebral soft tissues. Foramen magnum is satisfactory. C1 ring is intact. Hyoid bone and cartilaginous structures of the neck are intact. No evidence for displaced upper rib fracture. Very mild leftward convexity cervical curve. No fracture   or posttraumatic subluxation.     CANAL/FORAMINA: Moderate canal compromise C4-C5 and C5-C6 due to generalized disc bulging. No severe spinal stenosis. Moderate to severe right C5-C6 foraminal stenosis with no additional severe foraminal narrowing.    PARASPINAL: Lung apices are clear. No focal fluid collections, mass or adenopathy noted within the neck soft tissues. Thyroid is satisfactory. No mediastinal adenopathy or hematoma. Mastoid air cells are clear.      Impression    IMPRESSION:  1.  No fracture or posttraumatic subluxation.  2.  No high-grade spinal canal, with moderate canal compromise and moderate-severe narrowing mid cervical spine.         This note was created using voice recognition software. Undetected word substitutions or other errors may have occurred.     Starr Barber MD    Time spent with the patient, reviewing the EMR, reviewing laboratory and imaging studies, more than 50% of which was counseling and coordinating care:  30 minutes.

## 2022-05-22 NOTE — PLAN OF CARE
A/Ox4. SBA. RA. PRN oxy x2 for R flank pain. Bruising on R flank. Output: 500 ml, tea colored. Sleeping between cares. Continue POC.

## 2022-05-23 ENCOUNTER — PATIENT OUTREACH (OUTPATIENT)
Dept: FAMILY MEDICINE | Facility: CLINIC | Age: 47
End: 2022-05-23
Payer: COMMERCIAL

## 2022-05-23 DIAGNOSIS — R11.0 NAUSEA: Primary | ICD-10-CM

## 2022-05-23 LAB
ATRIAL RATE - MUSE: 77 BPM
DIASTOLIC BLOOD PRESSURE - MUSE: NORMAL MMHG
INTERPRETATION ECG - MUSE: NORMAL
P AXIS - MUSE: 49 DEGREES
PR INTERVAL - MUSE: 166 MS
QRS DURATION - MUSE: 86 MS
QT - MUSE: 392 MS
QTC - MUSE: 443 MS
R AXIS - MUSE: 29 DEGREES
SYSTOLIC BLOOD PRESSURE - MUSE: NORMAL MMHG
T AXIS - MUSE: 63 DEGREES
VENTRICULAR RATE- MUSE: 77 BPM

## 2022-05-23 RX ORDER — ONDANSETRON 4 MG/1
4 TABLET, ORALLY DISINTEGRATING ORAL EVERY 8 HOURS PRN
Qty: 12 TABLET | Refills: 1 | Status: SHIPPED | OUTPATIENT
Start: 2022-05-23 | End: 2024-02-14

## 2022-05-23 NOTE — TELEPHONE ENCOUNTER
"I discussed the patient's nausea, which I suspect is due to a combination of her recent injuries after fall from horse, and some of the medications she is having to take for symptomatic relief of her discomfort.  She has also stopped smoking tobacco within the past week, so some of her symptoms may be related to nicotine withdrawal.  I prescribed some ondansetron for her to take as needed until she follows up with me later next month.  Juan Mar, DO on 5/23/2022 at 4:13 PM      ED for acute condition Discharge Protocol    \"Hi, my name is Nae Funk RN, a registered nurse, and I am calling from [facility].  I am calling to follow up and see how things are going for you after your recent emergency visit.\"    Tell me how you are doing now that you are home?\" I am hanging in there, I'm working and I'm managing\" Taking meds on time, and doing flutter inhaler 8 times a day.       Discharge Instructions    \"Let's review your discharge instructions.  What is/are the follow-up recommendations?  Pt. Response:\" I need to schedule follow up appointment. I'm am doing my breathing inhalers (IS and Flutter valve) as I was taught.\"    \"Has an appointment with your primary care provider been scheduled?\"  No (needed - schedule appointment and remind to bring meds)    Medications    \"Tell me what changed about your medicines when you discharged?\" burping and gassy.       \"What questions do you have about your medications?\"   Questions about Tylenol and Ibuprofen   I have a question about taking ibuprofen and tylenol, when are they best to take? Discussed altering every 3 hours. So Tylenol would be every 6 hours and Ibuprofen would be every 6 hours on rotating schedule q 3 hours.     Also having nausea and feels gassy. Has not tried OTC medications like Tums etc. Checked for drug interactions on Up to Date. Told to stay hydrated and could try smaller more frequent meals. Advised provider out of office this week. " "Will route to provider pool. She is aware may not get response back today. Advised to call back if worsening or new symptoms.         Call Summary    \"What questions or concerns do you have about your recent visit and your follow-up care?\"     Will route to PCP to further advise on nausea and gas.     \"If you have questions or things don't continue to improve, we encourage you contact us through the main clinic number (give number).  Even if the clinic is not open, triage nurses are available 24/7 to help you.     We would like you to know that our clinic has extended hours (provide information).  We also have urgent care (provide details on closest location and hours/contact info)\"    \"Thank you for your time and take care!\"                "

## 2022-05-29 ENCOUNTER — NURSE TRIAGE (OUTPATIENT)
Dept: NURSING | Facility: CLINIC | Age: 47
End: 2022-05-29

## 2022-05-29 ENCOUNTER — HOSPITAL ENCOUNTER (EMERGENCY)
Facility: CLINIC | Age: 47
Discharge: HOME | End: 2022-05-30
Attending: EMERGENCY MEDICINE | Admitting: EMERGENCY MEDICINE
Payer: COMMERCIAL

## 2022-05-29 DIAGNOSIS — D72.829 LEUKOCYTOSIS, UNSPECIFIED TYPE: ICD-10-CM

## 2022-05-29 DIAGNOSIS — K59.00 CONSTIPATION, UNSPECIFIED CONSTIPATION TYPE: ICD-10-CM

## 2022-05-29 DIAGNOSIS — R14.0 ABDOMINAL BLOATING: ICD-10-CM

## 2022-05-29 DIAGNOSIS — I10 HYPERTENSION, UNSPECIFIED TYPE: ICD-10-CM

## 2022-05-29 LAB
BASOPHILS # BLD AUTO: 0 10E3/UL (ref 0–0.2)
BASOPHILS NFR BLD AUTO: 0 %
EOSINOPHIL # BLD AUTO: 0.3 10E3/UL (ref 0–0.7)
EOSINOPHIL NFR BLD AUTO: 2 %
ERYTHROCYTE [DISTWIDTH] IN BLOOD BY AUTOMATED COUNT: 12.1 % (ref 10–15)
HCT VFR BLD AUTO: 37.4 % (ref 35–47)
HGB BLD-MCNC: 12.9 G/DL (ref 11.7–15.7)
IMM GRANULOCYTES # BLD: 0 10E3/UL
IMM GRANULOCYTES NFR BLD: 0 %
LYMPHOCYTES # BLD AUTO: 2 10E3/UL (ref 0.8–5.3)
LYMPHOCYTES NFR BLD AUTO: 15 %
MCH RBC QN AUTO: 33.4 PG (ref 26.5–33)
MCHC RBC AUTO-ENTMCNC: 34.5 G/DL (ref 31.5–36.5)
MCV RBC AUTO: 97 FL (ref 78–100)
MONOCYTES # BLD AUTO: 0.7 10E3/UL (ref 0–1.3)
MONOCYTES NFR BLD AUTO: 6 %
NEUTROPHILS # BLD AUTO: 9.9 10E3/UL (ref 1.6–8.3)
NEUTROPHILS NFR BLD AUTO: 77 %
NRBC # BLD AUTO: 0 10E3/UL
NRBC BLD AUTO-RTO: 0 /100
PLATELET # BLD AUTO: 396 10E3/UL (ref 150–450)
RBC # BLD AUTO: 3.86 10E6/UL (ref 3.8–5.2)
WBC # BLD AUTO: 13 10E3/UL (ref 4–11)

## 2022-05-29 PROCEDURE — 80053 COMPREHEN METABOLIC PANEL: CPT | Performed by: EMERGENCY MEDICINE

## 2022-05-29 PROCEDURE — 96361 HYDRATE IV INFUSION ADD-ON: CPT

## 2022-05-29 PROCEDURE — 83690 ASSAY OF LIPASE: CPT | Performed by: EMERGENCY MEDICINE

## 2022-05-29 PROCEDURE — C9803 HOPD COVID-19 SPEC COLLECT: HCPCS

## 2022-05-29 PROCEDURE — 99285 EMERGENCY DEPT VISIT HI MDM: CPT | Mod: CS,25

## 2022-05-29 PROCEDURE — 87637 SARSCOV2&INF A&B&RSV AMP PRB: CPT | Performed by: EMERGENCY MEDICINE

## 2022-05-29 PROCEDURE — 258N000003 HC RX IP 258 OP 636: Performed by: EMERGENCY MEDICINE

## 2022-05-29 PROCEDURE — 36415 COLL VENOUS BLD VENIPUNCTURE: CPT | Performed by: EMERGENCY MEDICINE

## 2022-05-29 PROCEDURE — 96360 HYDRATION IV INFUSION INIT: CPT | Mod: 59

## 2022-05-29 PROCEDURE — 85025 COMPLETE CBC W/AUTO DIFF WBC: CPT | Performed by: EMERGENCY MEDICINE

## 2022-05-29 RX ADMIN — SODIUM CHLORIDE 1000 ML: 9 INJECTION, SOLUTION INTRAVENOUS at 23:49

## 2022-05-29 ASSESSMENT — ENCOUNTER SYMPTOMS
ABDOMINAL PAIN: 1
CHILLS: 1
ABDOMINAL DISTENTION: 1
DYSURIA: 0
CONSTIPATION: 1
SHORTNESS OF BREATH: 0
VOMITING: 0
NAUSEA: 1
COUGH: 0

## 2022-05-29 NOTE — TELEPHONE ENCOUNTER
Nurse Triage SBAR    Is this a 2nd Level Triage? YES, LICENSED PRACTITIONER REVIEW IS REQUIRED  (Medication Question)    Situation: Constipation    Background:   Pt discharged on Sunday 5/22 from [facility] due to fractured ribs.  Pt on oxycodone. Also on Senokot and Dulcolax for constipation, which does not seem to help.  Pt has tried a fleet enema on 5/26 after 4 days of no BM and feeling bloated.    Assessment:   Abdomen starting to feel bloated  Last BM was on 5/26 after use of fleet enema  No abdominal pain. No vomiting. No rectal pain.  No fever.    Protocol Recommended Disposition:   See PCP within 3 days  Pt scheduled to see PCP on 6/3    Recommendation:     Pt plans to increase fiber and fluids in her diet. Routing message to provider for laxative recommendations. Please call Sarahy at [phone].      Routed to provider    Does the patient meet one of the following criteria for ADS visit consideration? 16+ years old, with an [facility] PCP     TIP  Providers, please consider if this condition is appropriate for management at one of our Acute and Diagnostic Services sites.     If patient is a good candidate, please use dotphrase <dot>triageresponse and select Refer to ADS to document.    Zoila Lezama RN/[place] Nurse Advisor          Reason for Disposition    Unable to have a bowel movement (BM) without laxative or enema    Additional Information    Negative: Patient sounds very sick or weak to the triager    Negative: [1] Vomiting AND [2] abdomen looks much more swollen than usual    Negative: [1] Vomiting AND [2] contains bile (green color)    Negative: [1] Constant abdominal pain AND [2] present > 2 hours    Negative: [1] Rectal pain or fullness from fecal impaction (rectum full of stool) AND [2] NOT better after SITZ bath, suppository or enema    Negative: [1] Intermittent mild abdominal pain AND [2] fever    Negative: Abdomen is more swollen than usual    Negative: Last bowel movement (BM) > 4 days  ago    Negative: Leaking stool    Negative: Unable to have a bowel movement (BM) without manually removing stool (using finger to pull out stool or perform disimpaction)    Protocols used: CONSTIPATION-A-AH

## 2022-05-30 ENCOUNTER — APPOINTMENT (OUTPATIENT)
Dept: CT IMAGING | Facility: CLINIC | Age: 47
End: 2022-05-30
Attending: EMERGENCY MEDICINE
Payer: COMMERCIAL

## 2022-05-30 VITALS
HEART RATE: 68 BPM | OXYGEN SATURATION: 98 % | DIASTOLIC BLOOD PRESSURE: 95 MMHG | SYSTOLIC BLOOD PRESSURE: 166 MMHG | RESPIRATION RATE: 20 BRPM | TEMPERATURE: 97.6 F

## 2022-05-30 LAB
ALBUMIN SERPL-MCNC: 3.5 G/DL (ref 3.4–5)
ALBUMIN UR-MCNC: NEGATIVE MG/DL
ALP SERPL-CCNC: 77 U/L (ref 40–150)
ALT SERPL W P-5'-P-CCNC: 61 U/L (ref 0–50)
ANION GAP SERPL CALCULATED.3IONS-SCNC: 3 MMOL/L (ref 3–14)
APPEARANCE UR: CLEAR
AST SERPL W P-5'-P-CCNC: 28 U/L (ref 0–45)
BILIRUB SERPL-MCNC: 0.4 MG/DL (ref 0.2–1.3)
BILIRUB UR QL STRIP: NEGATIVE
BUN SERPL-MCNC: 16 MG/DL (ref 7–30)
CALCIUM SERPL-MCNC: 9.3 MG/DL (ref 8.5–10.1)
CHLORIDE BLD-SCNC: 106 MMOL/L (ref 94–109)
CO2 SERPL-SCNC: 26 MMOL/L (ref 20–32)
COLOR UR AUTO: NORMAL
CREAT SERPL-MCNC: 0.6 MG/DL (ref 0.52–1.04)
FLUAV RNA SPEC QL NAA+PROBE: NEGATIVE
FLUBV RNA RESP QL NAA+PROBE: NEGATIVE
GFR SERPL CREATININE-BSD FRML MDRD: >90 ML/MIN/1.73M2
GLUCOSE BLD-MCNC: 107 MG/DL (ref 70–99)
GLUCOSE UR STRIP-MCNC: NEGATIVE MG/DL
HCG UR QL: NEGATIVE
HGB UR QL STRIP: NEGATIVE
KETONES UR STRIP-MCNC: NEGATIVE MG/DL
LEUKOCYTE ESTERASE UR QL STRIP: NEGATIVE
LIPASE SERPL-CCNC: 80 U/L (ref 73–393)
NITRATE UR QL: NEGATIVE
PH UR STRIP: 6 [PH] (ref 5–7)
POTASSIUM BLD-SCNC: 3.4 MMOL/L (ref 3.4–5.3)
PROT SERPL-MCNC: 7.5 G/DL (ref 6.8–8.8)
RSV RNA SPEC NAA+PROBE: NEGATIVE
SARS-COV-2 RNA RESP QL NAA+PROBE: NEGATIVE
SODIUM SERPL-SCNC: 135 MMOL/L (ref 133–144)
SP GR UR STRIP: 1.01 (ref 1–1.03)
UROBILINOGEN UR STRIP-MCNC: NORMAL MG/DL

## 2022-05-30 PROCEDURE — 250N000009 HC RX 250: Performed by: EMERGENCY MEDICINE

## 2022-05-30 PROCEDURE — 250N000011 HC RX IP 250 OP 636: Performed by: EMERGENCY MEDICINE

## 2022-05-30 PROCEDURE — 81003 URINALYSIS AUTO W/O SCOPE: CPT | Performed by: EMERGENCY MEDICINE

## 2022-05-30 PROCEDURE — 81025 URINE PREGNANCY TEST: CPT | Performed by: EMERGENCY MEDICINE

## 2022-05-30 PROCEDURE — 74177 CT ABD & PELVIS W/CONTRAST: CPT

## 2022-05-30 RX ORDER — POLYETHYLENE GLYCOL 3350 17 G/17G
1 POWDER, FOR SOLUTION ORAL DAILY
Qty: 510 G | Refills: 0 | Status: SHIPPED | OUTPATIENT
Start: 2022-05-30 | End: 2022-06-29

## 2022-05-30 RX ORDER — MAGNESIUM CARB/ALUMINUM HYDROX 105-160MG
296 TABLET,CHEWABLE ORAL ONCE
Qty: 296 ML | Refills: 0 | Status: SHIPPED | OUTPATIENT
Start: 2022-05-30 | End: 2022-05-30

## 2022-05-30 RX ORDER — IOPAMIDOL 755 MG/ML
500 INJECTION, SOLUTION INTRAVASCULAR ONCE
Status: COMPLETED | OUTPATIENT
Start: 2022-05-30 | End: 2022-05-30

## 2022-05-30 RX ADMIN — IOPAMIDOL 82 ML: 755 INJECTION, SOLUTION INTRAVENOUS at 00:53

## 2022-05-30 RX ADMIN — SODIUM CHLORIDE 61 ML: 9 INJECTION, SOLUTION INTRAVENOUS at 00:54

## 2022-05-30 NOTE — ED TRIAGE NOTES
Pt aox4, ABCs intact. Pt c/o abdominal pain/bloating/constipation. Pt states that she has been taking a lot of pain medication to treat her 3 broken ribs and kidney laceration. Pt states that she has been bloated and has been taking miralax and enemas at home without success. Last bowel movement 5/26.     Triage Assessment     Row Name 05/29/22 [id number]       Triage Assessment (Adult)    Airway WDL WDL       Respiratory WDL    Respiratory WDL WDL       Cardiac WDL    Cardiac WDL WDL

## 2022-05-30 NOTE — ED PROVIDER NOTES
History   Chief Complaint:  Myriad of complaints    HPI   Sarahy Meng is a 46 year old female who presents with a myriad of complaints including increasing bloating, abdominal pain, constipation, nausea, and chills that started 4 days ago. On 5/21, the patient was admitted to the hospital for 3 rib fractures and R. Perinephric hemorrhage. She was discharged a week ago with oxycodone, ibuprofen, Tylenol, gabapentin, lidocaine patch, robaxin, and stool softeners which she is taking daily. Starting 4 days ago she started to feel bloated and constipated. She took a laxative, gave herself an enema, and had taken mag citrate which has not helped much. Her chills has been intermittent and come on with pain. She denies cough, chest pain, vomiting, dysuria, or shortness of breath. She took nausea meds at 9pm. She is vaccinated for Covid-19 but not boosted. She reports using her incentive spirometer at home.     Review of Systems   Constitutional: Positive for chills.   Respiratory: Negative for cough and shortness of breath.    Gastrointestinal: Positive for abdominal distention, abdominal pain, constipation and nausea. Negative for vomiting.   Genitourinary: Negative for dysuria.   All other systems reviewed and are negative.    Allergies:  The patient has no known allergies.     Medications:  Flexeril  Neurontin  Mirena  Robaxin  Zofran  Roxicodone  Senna-docusate  Lidocare    Past Medical History:     Contact dermatitis  Depression  Esophageal reflux    Past Surgical History:    C section    Family History:    Arthritis  Hypertension  Thyroid disease  Clotting disorder  Colitis  Colon polyps    Social History:  The patient presents with her     Physical Exam     Patient Vitals for the past 24 hrs:   BP Temp Pulse Resp SpO2   05/30/22 0130 (!) 166/95 -- 68 -- 98 %   05/30/22 0000 (!) 184/103 -- 73 -- 100 %   05/29/22 2345 (!) 189/100 -- -- -- 100 %   05/29/22 2315 (!) 186/102 -- 84 -- 99 %   05/29/22 2230 (!)  193/125 -- -- -- --   05/29/22 2227 -- 97.6  F (36.4  C) 84 20 97 %       Physical Exam  General: Alert. Appears comfortable  Head:  The scalp, face, and head appear normal without trauma  Eyes:  Sclera white; Pupils are equal and round  ENT:    External ears normal.     External nares normal.   Neck:  No midline tenderness or pain with full ROM.  CV:  Rate as above with regular rhythm   No murmur   2/2 radial and dorsal pedal pulses  Resp:  Breath sounds clear and equal bilaterally    Non-labored, no retractions or accessory muscle use  GI:  Abdomen soft, non-tender, non-distended    No rebound tenderness or guarding. JOHN without fecal impaction. Chaperone RN Catrachita  MSK:  No midline tenderness or bony step-off    No deformity    Moves all extremities equally and symmetrically  Skin:  No rash or lesions noted.  Ecchymosis to R. Flank region, minimally tender, no crepitance  Neuro:   No apparent deficit.    Strength 5/5 x4.  Sensation intact x4.     GCS: 15  Psych:  Normal affect.        Emergency Department Course     Imaging:  CT Chest/Abdomen/Pelvis w Contrast   Final Result   IMPRESSION:   1.  No significant changes since prior CT 05/21/2022.      2.  Stable blood products in the right perinephric space measuring approximately 6 mm in greatest radial dimension with no active bleeding/extravasation of contrast identified.      3.  Stable right sixth through eighth rib fractures with no pneumothorax.      4.  No acute findings of the chest.        Report per radiology    Laboratory:  Labs Ordered and Resulted from Time of ED Arrival to Time of ED Departure   COMPREHENSIVE METABOLIC PANEL - Abnormal       Result Value    Sodium 135      Potassium 3.4      Chloride 106      Carbon Dioxide (CO2) 26      Anion Gap 3      Urea Nitrogen 16      Creatinine 0.60      Calcium 9.3      Glucose 107 (*)     Alkaline Phosphatase 77      AST 28      ALT 61 (*)     Protein Total 7.5      Albumin 3.5      Bilirubin Total 0.4       GFR Estimate >90     CBC WITH PLATELETS AND DIFFERENTIAL - Abnormal    WBC Count 13.0 (*)     RBC Count 3.86      Hemoglobin 12.9      Hematocrit 37.4      MCV 97      MCH 33.4 (*)     MCHC 34.5      RDW 12.1      Platelet Count 396      % Neutrophils 77      % Lymphocytes 15      % Monocytes 6      % Eosinophils 2      % Basophils 0      % Immature Granulocytes 0      NRBCs per 100 WBC 0      Absolute Neutrophils 9.9 (*)     Absolute Lymphocytes 2.0      Absolute Monocytes 0.7      Absolute Eosinophils 0.3      Absolute Basophils 0.0      Absolute Immature Granulocytes 0.0      Absolute NRBCs 0.0     LIPASE - Normal    Lipase 80     URINE MACROSCOPIC WITH REFLEX TO MICRO - Normal    Color Urine Light Yellow      Appearance Urine Clear      Glucose Urine Negative      Bilirubin Urine Negative      Ketones Urine Negative      Specific Gravity Urine 1.010      Blood Urine Negative      pH Urine 6.0      Protein Albumin Urine Negative      Urobilinogen Urine Normal      Nitrite Urine Negative      Leukocyte Esterase Urine Negative     INFLUENZA A/B & SARS-COV2 PCR MULTIPLEX - Normal    Influenza A PCR Negative      Influenza B PCR Negative      RSV PCR Negative      SARS CoV2 PCR Negative     HCG QUALITATIVE URINE - Normal    hCG Urine Qualitative Negative        Emergency Department Course:    Reviewed:  I reviewed nursing notes, vitals, past medical history and Care Everywhere    Assessments:  2306 I obtained history and examined the patient as noted above.     0138 I rechecked the patient and explained findings.     Interventions:  2349 NaCl Bolus 1,000 mL IV    Disposition:  The patient was discharged to home.     Impression & Plan     Medical Decision Making:  Patient is a 46-year-old female presenting with a myriad of complaints including abdominal pain, constipation, nausea and chills.  She is hypertensive on arrival though nontoxic-appearing.  Labs with noted mild leukocytosis.  Possibly reactive, I have a  lower clinical suspicion for serious bacterial etiology.  UA negative.  She tested negative for COVID-19/influenza during her time in the ED.  CT abdomen without evidence of intra-abdominal catastrophe though does show persistent right perinephric blood products and findings of increased stool burden.  CT chest without evidence of pneumonia, pneumothorax; stable rib fractures identified.  Patient declined analgesia during her time in the ED.  There is no evidence to suggest impaction today.  I did discuss with patient recommendation to cut back on oxycodone use as this can precipitate constipation.  I also will recommend continuation of Colace though will supplement with MiraLAX and provide another magnesium citrate enema.  Dietary recommendations discussed.  Recommend monitoring for fever, worsening pain, vomiting or should symptoms worsen or change to promptly represent to the ED for further evaluation. We did discuss she is at risk for pneumonia 2/2 to rib fractures in particular.  Finally patient BP downtrended during her time in the ED though she denies any history of hypertension; recommended BP diary to discuss measurements with PCP. No chest pain or other concerning symptoms. Planning close outpatient follow-up.  All questions addressed.      Diagnosis:    ICD-10-CM    1. Abdominal bloating  R14.0    2. Constipation, unspecified constipation type  K59.00    3. Leukocytosis, unspecified type  D72.829    4. Hypertension, unspecified type  I10        Discharge Medications:  New Prescriptions    MAGNESIUM CITRATE 1.745 GM/30ML SOLUTION    Take 296 mLs by mouth once for 1 dose    POLYETHYLENE GLYCOL (MIRALAX) 17 GM/DOSE POWDER    Take 17 g (1 capful) by mouth daily       Scribe Disclosure:  Ana PIEDRA, am serving as a scribe at 11:07 PM on 5/29/2022 to document services personally performed by Lilliana Lopez DO based on my observations and the provider's statements to me.          John  Lilliana GAITAN DO  05/30/22 0348

## 2022-05-31 ENCOUNTER — PATIENT OUTREACH (OUTPATIENT)
Dept: FAMILY MEDICINE | Facility: CLINIC | Age: 47
End: 2022-05-31
Payer: COMMERCIAL

## 2022-05-31 NOTE — TELEPHONE ENCOUNTER
Call to Sarahy she went to ED over weekend because she was feeling so lousy she says. Constipation ended up being the issue, prescribed  medications and said they worked well she reports. Says BP was elevated in ED. Also white blood counts elevated. ED doctor said to monitor BP daily. Is getting home cuff today. Says high BP runs on family and also to note she took phentermine the day of ER visit. She is wanting to keep an eye on BP, and will follow up with provider at scheduled ED follow up this week.       Feeling a lot better today. Off Oxycodone and taking Tylenol alternating with ibuprofen. Has a few more days of gabapentin but weaning off that. Says she is able to move around today, still has some pain from the fractures but much, much better.     .Nae Funk R.N.

## 2022-05-31 NOTE — TELEPHONE ENCOUNTER
"  ED for acute condition Discharge Protocol    \"Hi, my name is Nae Funk RN, a registered nurse, and I am calling from [facility].  I am calling to follow up and see how things are going for you after your recent emergency visit.\"    Tell me how you are doing now that you are home?\" I'm feeling much, much better.       Discharge Instructions    \"Let's review your discharge instructions.  What is/are the follow-up recommendations?  Pt. Response: Follow up at the clinic this week. Medication given.     \"Has an appointment with your primary care provider been scheduled?\"  Yes. (confirm and remind to bring meds)    Medications    \"Tell me what changed about your medicines when you discharged?\"    I was prescribed medication for constipation.     \"What questions do you have about your medications?\"   None       Also of note: Constipation ended up being the issue, prescribed  medications and said they worked well she reports. Says BP was elevated in ED. Also white blood counts elevated. ED doctor said to monitor BP daily. Is getting home cuff today. Says high BP runs on family and also to note she took phentermine the day of ER visit. She is wanting to keep an eye on BP, and will follow up with provider at scheduled ED follow up this week.         Feeling a lot better today. Off Oxycodone and taking Tylenol alternating with ibuprofen. Has a few more days of gabapentin but weaning off that. Says she is able to move around today, still has some pain from the fractures but much, much better.           Call Summary    \"What questions or concerns do you have about your recent visit and your follow-up care?\"     none    \"If you have questions or things don't continue to improve, we encourage you contact us through the main clinic number (give number).  Even if the clinic is not open, triage nurses are available 24/7 to help you.     We would like you to know that our clinic has extended hours (provide " "information).  We also have urgent care (provide details on closest location and hours/contact info)\"    \"Thank you for your time and take care!\"                "

## 2022-06-03 ENCOUNTER — OFFICE VISIT (OUTPATIENT)
Dept: FAMILY MEDICINE | Facility: CLINIC | Age: 47
End: 2022-06-03
Payer: COMMERCIAL

## 2022-06-03 VITALS
DIASTOLIC BLOOD PRESSURE: 88 MMHG | HEIGHT: 60 IN | BODY MASS INDEX: 32.2 KG/M2 | TEMPERATURE: 98.2 F | WEIGHT: 164 LBS | OXYGEN SATURATION: 100 % | RESPIRATION RATE: 16 BRPM | SYSTOLIC BLOOD PRESSURE: 160 MMHG | HEART RATE: 74 BPM

## 2022-06-03 DIAGNOSIS — R74.8 ELEVATED LIVER ENZYMES: ICD-10-CM

## 2022-06-03 DIAGNOSIS — Z87.891 HISTORY OF SMOKING: ICD-10-CM

## 2022-06-03 DIAGNOSIS — F41.8 SITUATIONAL ANXIETY: ICD-10-CM

## 2022-06-03 DIAGNOSIS — I10 HYPERTENSION, UNSPECIFIED TYPE: Primary | ICD-10-CM

## 2022-06-03 DIAGNOSIS — D72.829 LEUKOCYTOSIS, UNSPECIFIED TYPE: ICD-10-CM

## 2022-06-03 LAB
ERYTHROCYTE [DISTWIDTH] IN BLOOD BY AUTOMATED COUNT: 12 % (ref 10–15)
HCT VFR BLD AUTO: 38.7 % (ref 35–47)
HGB BLD-MCNC: 13.5 G/DL (ref 11.7–15.7)
MCH RBC QN AUTO: 34.4 PG (ref 26.5–33)
MCHC RBC AUTO-ENTMCNC: 34.9 G/DL (ref 31.5–36.5)
MCV RBC AUTO: 99 FL (ref 78–100)
PLATELET # BLD AUTO: 476 10E3/UL (ref 150–450)
RBC # BLD AUTO: 3.93 10E6/UL (ref 3.8–5.2)
WBC # BLD AUTO: 5.4 10E3/UL (ref 4–11)

## 2022-06-03 PROCEDURE — 85027 COMPLETE CBC AUTOMATED: CPT | Performed by: FAMILY MEDICINE

## 2022-06-03 PROCEDURE — 80076 HEPATIC FUNCTION PANEL: CPT | Performed by: FAMILY MEDICINE

## 2022-06-03 PROCEDURE — 90471 IMMUNIZATION ADMIN: CPT | Performed by: FAMILY MEDICINE

## 2022-06-03 PROCEDURE — 36415 COLL VENOUS BLD VENIPUNCTURE: CPT | Performed by: FAMILY MEDICINE

## 2022-06-03 PROCEDURE — 99496 TRANSJ CARE MGMT HIGH F2F 7D: CPT | Performed by: FAMILY MEDICINE

## 2022-06-03 PROCEDURE — 90677 PCV20 VACCINE IM: CPT | Performed by: FAMILY MEDICINE

## 2022-06-03 RX ORDER — HYDROXYZINE PAMOATE 50 MG/1
50 CAPSULE ORAL 3 TIMES DAILY PRN
Qty: 30 CAPSULE | Refills: 1 | Status: SHIPPED | OUTPATIENT
Start: 2022-06-03 | End: 2024-02-14

## 2022-06-03 ASSESSMENT — ANXIETY QUESTIONNAIRES
1. FEELING NERVOUS, ANXIOUS, OR ON EDGE: NEARLY EVERY DAY
3. WORRYING TOO MUCH ABOUT DIFFERENT THINGS: SEVERAL DAYS
6. BECOMING EASILY ANNOYED OR IRRITABLE: NEARLY EVERY DAY
GAD7 TOTAL SCORE: 11
8. IF YOU CHECKED OFF ANY PROBLEMS, HOW DIFFICULT HAVE THESE MADE IT FOR YOU TO DO YOUR WORK, TAKE CARE OF THINGS AT HOME, OR GET ALONG WITH OTHER PEOPLE?: SOMEWHAT DIFFICULT
5. BEING SO RESTLESS THAT IT IS HARD TO SIT STILL: NOT AT ALL
4. TROUBLE RELAXING: MORE THAN HALF THE DAYS
2. NOT BEING ABLE TO STOP OR CONTROL WORRYING: MORE THAN HALF THE DAYS
7. FEELING AFRAID AS IF SOMETHING AWFUL MIGHT HAPPEN: NOT AT ALL
7. FEELING AFRAID AS IF SOMETHING AWFUL MIGHT HAPPEN: NOT AT ALL

## 2022-06-03 ASSESSMENT — PATIENT HEALTH QUESTIONNAIRE - PHQ9
10. IF YOU CHECKED OFF ANY PROBLEMS, HOW DIFFICULT HAVE THESE PROBLEMS MADE IT FOR YOU TO DO YOUR WORK, TAKE CARE OF THINGS AT HOME, OR GET ALONG WITH OTHER PEOPLE: VERY DIFFICULT
SUM OF ALL RESPONSES TO PHQ QUESTIONS 1-9: 8
SUM OF ALL RESPONSES TO PHQ QUESTIONS 1-9: 8

## 2022-06-03 NOTE — PROGRESS NOTES
Prior to immunization administration, verified patients identity using patient s name and date of birth. Please see Immunization Activity for additional information.     Screening Questionnaire for Adult Immunization    Are you sick today?   No   Do you have allergies to medications, food, a vaccine component or latex?   No   Have you ever had a serious reaction after receiving a vaccination?   No   Do you have a long-term health problem with heart, lung, kidney, or metabolic disease (e.g., diabetes), asthma, a blood disorder, no spleen, complement component deficiency, a cochlear implant, or a spinal fluid leak?  Are you on long-term aspirin therapy?   No   Do you have cancer, leukemia, HIV/AIDS, or any other immune system problem?   No   Do you have a parent, brother, or sister with an immune system problem?   No   In the past 3 months, have you taken medications that affect  your immune system, such as prednisone, other steroids, or anticancer drugs; drugs for the treatment of rheumatoid arthritis, Crohn s disease, or psoriasis; or have you had radiation treatments?   No   Have you had a seizure, or a brain or other nervous system problem?   No   During the past year, have you received a transfusion of blood or blood    products, or been given immune (gamma) globulin or antiviral drug?   No   For women: Are you pregnant or is there a chance you could become       pregnant during the next month?   No   Have you received any vaccinations in the past 4 weeks?   No     Immunization questionnaire answers were all negative.        Per orders of Dr. Mar, injection of Prevnar 20 given by Chayo Sarah. Patient instructed to remain in clinic for 15 minutes afterwards, and to report any adverse reaction to me immediately.       Screening performed by Chayo Sarah on 6/3/2022 at 9:57 AM.

## 2022-06-03 NOTE — PROGRESS NOTES
Assessment & Plan    See after visit summary and result note from studies for helpful information and advice given to patient.    Situational anxiety    - hydrOXYzine (VISTARIL) 50 MG capsule  Dispense: 30 capsule; Refill: 1    Elevated liver enzymes    - Hepatic function panel    Leukocytosis, unspecified type    - CBC with platelets    Hypertension, unspecified type    History of smoking    - Pneumococcal 20 Valent Conjugate (Prevnar 20)                   Return in about 1 month (around 7/3/2022), or if symptoms worsen or fail to improve.    Juan Mar,   [facility] ANJEL Weathers is a 46 year old who presents for the following health issues     History of Present Illness       Reason for visit:  Follow up to horseback riding accident broken ribs    She eats 2-3 servings of fruits and vegetables daily.She consumes 1 sweetened beverage(s) daily.She exercises with enough effort to increase her heart rate 9 or less minutes per day.  She exercises with enough effort to increase her heart rate 3 or less days per week.   She is taking medications regularly.    Today's PHQ-9         PHQ-9 Total Score: 8    PHQ-9 Q9 Thoughts of better off dead/self-harm past 2 weeks :   Not at all    How difficult have these problems made it for you to do your work, take care of things at home, or get along with other people: Very difficult  Today's TAM-7 Score: 11         Hospital Follow-up Visit:    Hospital/Nursing Home/IP Rehab Facility: [facility]  Date of Admission: 05/21/2022  Date of Discharge: 5/22/2022  Reason(s) for Admission: #1 fall from horse riding event  #2  Multiple rib fractures, right 6-8 rib fractures secondary from #1  #3 perinephric hemorrhage secondary to #1  #4 Transminitis likely from recent fall  #5 tobacco abuser      Was your hospitalization related to COVID-19? No   Problems taking medications regularly:  None  Medication changes since discharge:  "None  Problems adhering to non-medication therapy:  None    Summary of hospitalization:  [facility] discharge summary reviewed  Diagnostic Tests/Treatments reviewed.  Follow up needed: Yes, for blood pressure management.   Other Healthcare Providers Involved in Patient s Care:         None  Update since discharge: less bloating, still considerable pain.       Post Discharge Medication Reconciliation: discharge medications reconciled and changed, per note/orders.  Plan of care communicated with patient                  Review of Systems   Patient is seen primarily for follow-up management of recent rib fractures related to fall from horse.    She is having recent mood swings related to stopping smoking 2 weeks ago, and recent injuries. She is crying more, but no thoughts of harming self.     She has felt more anxious.     She is using a 14 mg nicotine patch.     She has not taken phentermine for past week due to higher blood pressure.     Is taking ibuprofen every 6 hours.  We discussed how this can increase her blood pressure.  I advised using acetaminophen initially for pain relief.  Her liver enzymes are not so elevated that using acetaminophen is contraindicated.    Last BM was 2 days ago, and was soft.  We discussed how narcotics can lead to constipation which may need to be treated with a stimulant laxative.        Objective    BP (!) 160/88 (BP Location: Right arm, Patient Position: Sitting, Cuff Size: Adult Large)   Pulse 74   Temp 98.2  F (36.8  C) (Oral)   Resp 16   Ht 1.518 m (4' 11.75\")   Wt 74.4 kg (164 lb)   LMP 05/26/2022   SpO2 100%   BMI 32.30 kg/m    Body mass index is 32.3 kg/m .  Physical Exam   Vital signs reviewed.  Patient is in brief acute appearing distress during initial visit with brief period of crying.  She attributes this to increased anxiety while trying to stop smoking tobacco. Breathing appears nonlabored.  Patient is alert and oriented ×3.  Patient is very " pleasant, making good eye contact and responding with clear fluent speech.    Heart: Heart rate is regular without murmur.    Lungs: Lungs are clear to auscultation with good airflow bilaterally.    Skin/extremities: Warm and dry, with no lower leg edema.    Mild ecchymosis right flank related to recent fall from horse.

## 2022-06-03 NOTE — PATIENT INSTRUCTIONS
Try to use acetaminophen instead of ibuprofen, or naproxen, because these two medications can increase blood pressure. I think your anxiety is related to recent injury and smoking cessation. I will review your studies via [employer] when they are available. If you have any questions or concerns please let me know via [employer], or call the clinic. I think modifying your medications, diet, and stopping smoking are the best approach for now for high blood pressure.

## 2022-06-04 LAB
ALBUMIN SERPL-MCNC: 4.1 G/DL (ref 3.4–5)
ALP SERPL-CCNC: 87 U/L (ref 40–150)
ALT SERPL W P-5'-P-CCNC: 66 U/L (ref 0–50)
AST SERPL W P-5'-P-CCNC: 21 U/L (ref 0–45)
BILIRUB DIRECT SERPL-MCNC: 0.1 MG/DL (ref 0–0.2)
BILIRUB SERPL-MCNC: 0.4 MG/DL (ref 0.2–1.3)
PROT SERPL-MCNC: 7.8 G/DL (ref 6.8–8.8)

## 2022-06-17 ENCOUNTER — HOSPITAL ENCOUNTER (OUTPATIENT)
Facility: CLINIC | Age: 47
Discharge: HOME | End: 2022-06-17
Attending: INTERNAL MEDICINE | Admitting: INTERNAL MEDICINE
Payer: COMMERCIAL

## 2022-06-17 VITALS
RESPIRATION RATE: 12 BRPM | WEIGHT: 164 LBS | TEMPERATURE: 98 F | BODY MASS INDEX: 32.2 KG/M2 | HEIGHT: 60 IN | OXYGEN SATURATION: 98 % | DIASTOLIC BLOOD PRESSURE: 95 MMHG | HEART RATE: 89 BPM | SYSTOLIC BLOOD PRESSURE: 128 MMHG

## 2022-06-17 LAB — COLONOSCOPY: NORMAL

## 2022-06-17 PROCEDURE — G0500 MOD SEDAT ENDO SERVICE >5YRS: HCPCS | Performed by: INTERNAL MEDICINE

## 2022-06-17 PROCEDURE — 88305 TISSUE EXAM BY PATHOLOGIST: CPT | Mod: TC | Performed by: INTERNAL MEDICINE

## 2022-06-17 PROCEDURE — 250N000011 HC RX IP 250 OP 636: Performed by: INTERNAL MEDICINE

## 2022-06-17 PROCEDURE — 88305 TISSUE EXAM BY PATHOLOGIST: CPT | Mod: 26 | Performed by: PATHOLOGY

## 2022-06-17 PROCEDURE — 45385 COLONOSCOPY W/LESION REMOVAL: CPT | Mod: PT | Performed by: INTERNAL MEDICINE

## 2022-06-17 RX ORDER — LIDOCAINE 40 MG/G
CREAM TOPICAL
Status: DISCONTINUED | OUTPATIENT
Start: 2022-06-17 | End: 2022-06-17 | Stop reason: HOSPADM

## 2022-06-17 RX ORDER — ONDANSETRON 2 MG/ML
4 INJECTION INTRAMUSCULAR; INTRAVENOUS
Status: DISCONTINUED | OUTPATIENT
Start: 2022-06-17 | End: 2022-06-17 | Stop reason: HOSPADM

## 2022-06-17 RX ORDER — NALOXONE HYDROCHLORIDE 0.4 MG/ML
0.4 INJECTION, SOLUTION INTRAMUSCULAR; INTRAVENOUS; SUBCUTANEOUS
Status: DISCONTINUED | OUTPATIENT
Start: 2022-06-17 | End: 2022-06-17 | Stop reason: HOSPADM

## 2022-06-17 RX ORDER — NALOXONE HYDROCHLORIDE 0.4 MG/ML
0.2 INJECTION, SOLUTION INTRAMUSCULAR; INTRAVENOUS; SUBCUTANEOUS
Status: DISCONTINUED | OUTPATIENT
Start: 2022-06-17 | End: 2022-06-17 | Stop reason: HOSPADM

## 2022-06-17 RX ORDER — ONDANSETRON 2 MG/ML
4 INJECTION INTRAMUSCULAR; INTRAVENOUS EVERY 6 HOURS PRN
Status: DISCONTINUED | OUTPATIENT
Start: 2022-06-17 | End: 2022-06-17 | Stop reason: HOSPADM

## 2022-06-17 RX ORDER — ONDANSETRON 4 MG/1
4 TABLET, ORALLY DISINTEGRATING ORAL EVERY 6 HOURS PRN
Status: DISCONTINUED | OUTPATIENT
Start: 2022-06-17 | End: 2022-06-17 | Stop reason: HOSPADM

## 2022-06-17 RX ORDER — FLUMAZENIL 0.1 MG/ML
0.2 INJECTION, SOLUTION INTRAVENOUS
Status: DISCONTINUED | OUTPATIENT
Start: 2022-06-17 | End: 2022-06-17 | Stop reason: HOSPADM

## 2022-06-17 RX ORDER — FENTANYL CITRATE 0.05 MG/ML
50-100 INJECTION, SOLUTION INTRAMUSCULAR; INTRAVENOUS EVERY 5 MIN PRN
Status: DISCONTINUED | OUTPATIENT
Start: 2022-06-17 | End: 2022-06-17 | Stop reason: HOSPADM

## 2022-06-17 RX ORDER — EPINEPHRINE 1 MG/ML
0.1 INJECTION, SOLUTION INTRAMUSCULAR; SUBCUTANEOUS
Status: DISCONTINUED | OUTPATIENT
Start: 2022-06-17 | End: 2022-06-17 | Stop reason: HOSPADM

## 2022-06-17 RX ORDER — SIMETHICONE 40MG/0.6ML
133 SUSPENSION, DROPS(FINAL DOSAGE FORM)(ML) ORAL
Status: DISCONTINUED | OUTPATIENT
Start: 2022-06-17 | End: 2022-06-17 | Stop reason: HOSPADM

## 2022-06-17 RX ORDER — ATROPINE SULFATE 0.1 MG/ML
1 INJECTION INTRAVENOUS
Status: DISCONTINUED | OUTPATIENT
Start: 2022-06-17 | End: 2022-06-17 | Stop reason: HOSPADM

## 2022-06-17 RX ORDER — PROCHLORPERAZINE MALEATE 10 MG
10 TABLET ORAL EVERY 6 HOURS PRN
Status: DISCONTINUED | OUTPATIENT
Start: 2022-06-17 | End: 2022-06-17 | Stop reason: HOSPADM

## 2022-06-17 RX ORDER — DIPHENHYDRAMINE HYDROCHLORIDE 50 MG/ML
25-50 INJECTION INTRAMUSCULAR; INTRAVENOUS
Status: DISCONTINUED | OUTPATIENT
Start: 2022-06-17 | End: 2022-06-17 | Stop reason: HOSPADM

## 2022-06-17 RX ADMIN — FENTANYL CITRATE 100 MCG: 50 INJECTION INTRAMUSCULAR; INTRAVENOUS at 09:36

## 2022-06-17 RX ADMIN — FENTANYL CITRATE 50 MCG: 50 INJECTION INTRAMUSCULAR; INTRAVENOUS at 09:42

## 2022-06-17 RX ADMIN — MIDAZOLAM HYDROCHLORIDE 2 MG: 1 INJECTION, SOLUTION INTRAMUSCULAR; INTRAVENOUS at 09:36

## 2022-06-17 RX ADMIN — MIDAZOLAM HYDROCHLORIDE 1 MG: 1 INJECTION, SOLUTION INTRAMUSCULAR; INTRAVENOUS at 09:44

## 2022-06-17 RX ADMIN — MIDAZOLAM HYDROCHLORIDE 1 MG: 1 INJECTION, SOLUTION INTRAMUSCULAR; INTRAVENOUS at 09:42

## 2022-06-17 NOTE — H&P
Pre-Endoscopy History and Physical     Sarahy Meng MRN# [id number]   YOB: 1975 Age: 46 year old     Date of Procedure: 2022  Primary care provider: Juan Mar  Type of Endoscopy: Colonoscopy with possible biopsy, possible polypectomy  Reason for Procedure: screen  Type of Anesthesia Anticipated: Conscious Sedation    HPI:    Sarahy is a 46 year old female who will be undergoing the above procedure.      A history and physical has been performed. The patient's medications and allergies have been reviewed. The risks and benefits of the procedure and the sedation options and risks were discussed with the patient.  All questions were answered and informed consent was obtained.      She denies a personal or family history of anesthesia complications or bleeding disorders.     Patient Active Problem List   Diagnosis     Esophageal reflux     Tobacco abuse     Obesity     CARDIOVASCULAR SCREENING; LDL GOAL LESS THAN 160     Anxiety     Encounter for insertion of intrauterine contraceptive device     Laceration of right kidney, initial encounter     Closed head injury, initial encounter     Traumatic perinephric hematoma of right kidney, initial encounter     Closed fracture of multiple ribs of right side, initial encounter        Past Medical History:   Diagnosis Date     Contact dermatitis and other eczema, due to unspecified cause      Depressive disorder, not elsewhere classified      Esophageal reflux         Past Surgical History:   Procedure Laterality Date     [facility] NONSPECIFIC PROCEDURE       - boy 89     [facility] NONSPECIFIC PROCEDURE      R. [employer]        Social History     Tobacco Use     Smoking status: Former Smoker     Years: 15.00     Types: Cigarettes     Quit date: 2022     Years since quittin.0     Smokeless tobacco: Never Used     Tobacco comment: 6 ciggs a day   Substance Use Topics     Alcohol use: Yes     Comment: 3 drinks/week       Family History    Problem Relation Age of Onset     Arthritis Mother      Hypertension Mother      Thyroid Disease Mother      Clotting Disorder Mother         blood     Colitis Mother      Colon Polyps Father      Heart Disease Maternal Grandfather      Diabetes Maternal Grandfather      Cerebrovascular Disease Maternal Grandfather      Arthritis Maternal Grandfather      Cancer Maternal Grandfather         lung     Breast Cancer No family hx of      Cancer - colorectal No family hx of      Colon Cancer No family hx of        Prior to Admission medications    Medication Sig Start Date End Date Taking? Authorizing Provider   acetaminophen (TYLENOL) 500 MG tablet Take 2 tablets (1,000 mg) by mouth every 6 hours as needed for mild pain 5/22/22  Yes Starr Barber MD   hydrOXYzine (VISTARIL) 50 MG capsule Take 1 capsule (50 mg) by mouth 3 times daily as needed for itching 6/3/22  Yes Juan Mar DO   ibuprofen (ADVIL/MOTRIN) 600 MG tablet Take 1 tablet (600 mg) by mouth every 6 hours as needed (mild to moderate pain) 5/22/22  Yes Starr Barber MD   levonorgestrel (MIRENA) 20 MCG/24HR IUD 1 each (20 mcg) by Intrauterine route once   Yes Yasmine Crabtree MD   methocarbamol (ROBAXIN) 500 MG tablet Take 1 tablet (500 mg) by mouth every 6 hours as needed for muscle spasms 5/22/22  Yes Starr Barber MD   ondansetron (ZOFRAN ODT) 4 MG ODT tab Take 1 tablet (4 mg) by mouth every 8 hours as needed for nausea 5/23/22  Yes Juan Mar DO   polyethylene glycol (MIRALAX) 17 GM/Dose powder Take 17 g (1 capful) by mouth daily  Patient taking differently: Take 1 capful by mouth daily as needed 5/30/22 6/29/22 Yes Lilliana Lopez DO   senna-docusate (SENOKOT-S/PERICOLACE) 8.6-50 MG tablet Take 1-2 tablets by mouth 2 times daily as needed for constipation 5/22/22  Yes Starr Barber MD   phentermine (ADIPEX-P) 37.5 MG tablet Take 1 tablet (37.5 mg) by mouth every morning (before breakfast)  Patient taking  "differently: Take 0.5 tablets by mouth every morning (before breakfast) 4/22/22   Aaseby-Aguilera, Ramona Ann, PA-C       Allergies   Allergen Reactions     No Known Drug Allergies         REVIEW OF SYSTEMS:   5 point ROS negative except as noted above in HPI, including Gen., Resp., CV, GI &  system review.    PHYSICAL EXAM:   [facility] 05/26/2022  Estimated body mass index is 32.3 kg/m  as calculated from the following:    Height as of 6/3/22: 1.518 m (4' 11.75\").    Weight as of 6/3/22: 74.4 kg (164 lb).   GENERAL APPEARANCE: alert, and oriented  MENTAL STATUS: alert  AIRWAY EXAM: Mallampatti Class I (visualization of the soft palate, fauces, uvula, anterior and posterior pillars)  RESP: lungs clear to auscultation - no rales, rhonchi or wheezes  CV: regular rates and rhythm  DIAGNOSTICS:    Not indicated    IMPRESSION   ASA Class 1 - Healthy patient, no medical problems    PLAN:   Plan for Colonoscopy with possible biopsy, possible polypectomy. We discussed the risks, benefits and alternatives and the patient wished to proceed.    The above has been forwarded to the consulting provider.      Signed Electronically by: Tung Jimenez MD  June 17, 2022          "

## 2022-06-17 NOTE — DISCHARGE INSTRUCTIONS
The patient has received a copy of the Provation  report the doctor has written and discharge instructions have been discussed with the patient and responsible adult.  All questions were addressed and answered prior to patient discharge.       Understanding Colon and Rectal Polyps     The colon has a smooth lining composed of millions of cells.     The colon (also called the large intestine) is a muscular tube that forms the last part of the digestive tract. It absorbs water and stores food waste. The colon is about 4 to 6 feet long. The rectum is the last 6 inches of the colon. The colon and rectum have a smooth lining composed of millions of cells. Changes in these cells can lead to growths in the colon that can become cancerous and should be removed.     When the Colon Lining Changes  Changes that occur in the cells that line the colon or rectum can lead to growths called polyps. Over a period of years, polyps can turn cancerous. Removing polyps early may prevent cancer from ever forming.      Polyps  Polyps are fleshy clumps of tissue that form on the lining of the colon or rectum. Small polyps are usually benign (not cancerous). However, over time, cells in a polyp can change and become cancerous. The larger a polyp grows, the more likely this is to happen. Also, certain types of polyps known as adenomatous polyps are considered premalignant. This means that they will almost always become cancerous if they re not removed.          Cancer  Almost all colorectal cancers start when polyp cells begin growing abnormally. As a cancerous tumor grows, it may involve more and more of the colon or rectum. In time, cancer can also grow beyond the colon or rectum and spread to nearby organs or to glands called lymph nodes. The cells can also travel to other parts of the body. This is known as metastasis. The earlier a cancerous tumor is removed, the better the chance of preventing its spread.        [id number] [employer], [address] [place] [address]. All rights reserved. This information is not intended as a substitute for professional medical care. Always follow your healthcare professional's instructions.       Understanding Diverticulosis and Diverticulitis     Pouches or diverticula usually occur in the lower part of the colon called the sigmoid.      Diverticulitis occurs when the pouches become inflamed.     The colon (large intestine) is the last part of the digestive tract. It absorbs water from stool and changes it from a liquid to a solid. In certain cases, small pouches called diverticula can form in the colon wall. This condition is called diverticulosis. The pouches can become infected. If this happens, it becomes a more serious problem called diverticulitis. These problems can be painful. But they can be managed.   Managing Your Condition  Diet changes or taking medications are often tried first. These may be enough to bring relief. If the case is bad, surgery may be done. You and your doctor can discuss the plan that is best for you.  If You Have Diverticulosis  Diet changes are often enough to control symptoms. The main changes are adding fiber (roughage) and drinking more water. Fiber absorbs water as it travels through your colon. This helps your stool stay soft and move smoothly. Water helps this process. If needed, you may be told to take over-the-counter stool softeners. To help relieve pain, antispasmodic medications may be prescribed.  If You Have Diverticulitis  Treatment depends on how bad your symptoms are.  For mild symptoms: You may be put on a liquid diet for a short time. You may also be prescribed antibiotics. If these two steps relieve your symptoms, you may then be prescribed a high-fiber diet. If you still have symptoms, your doctor will discuss further treatment options with you.  For severe symptoms: You may need to be admitted to the hospital. There, you can be given IV  antibiotics and fluids. Once symptoms are under control, the above treatments may be tried. If these don t control your condition, your doctor may discuss the option of having surgery with you.  [place] [facility] Colon Health  Help keep your colon healthy with a diet that includes plenty of high-fiber fruits, vegetables, and whole grains. Drink plenty of liquids like water and juice. Your doctor may also recommend avoiding seeds and nuts.          [id number] [facility], [address] [place] [address], [place] [address]. All rights reserved. This information is not intended as a substitute for professional medical care. Always follow your healthcare professional's instructions.       Eating a High-Fiber Diet  Fiber is what gives strength and structure to plants. Most grains, beans, vegetables, and fruits contain fiber. Foods rich in fiber are often low in calories and fat, and they fill you up more. They may also reduce your risks for certain health problems. To find out the amount of fiber in canned, packaged, or frozen foods, read the  Nutrition Facts  label. It tells you how much fiber is in a serving.      Types of Fiber and Their Benefits  There are two types of fiber: insoluble and soluble. They both aid digestion and help you maintain a healthy weight.  Insoluble fiber: This is found in whole grains, cereals, certain fruits and vegetables (such as apple skin, corn, and carrots). Insoluble fiber may prevent constipation and reduce the risk of certain types of cancer.   Soluble fiber: This type of fiber is in oats, beans, and certain fruits and vegetables (such as strawberries and peas). Soluble fiber can reduce cholesterol (which may help lower the risk of heart disease), and helps control blood sugar levels.  Look for High-Fiber Foods  Whole-grain breads and cereals: Try to eat 6-8 ounces a day. Include wheat and oat bran cereals, whole-wheat muffins or toast, and corn tortillas in your meals.  Fruits: Try to eat 2 cups a  day. Apples, oranges, strawberries, pears, and bananas are good sources. (Note: Fruit juice is low in fiber.)  Vegetables: Try to eat 3 cups a day. Add asparagus, carrots, broccoli, peas, and corn to your meals.  Legumes (beans): One cup of cooked lentils gives you over 15 grams of fiber. Try navy beans, lentils, and chickpeas.  Seeds:  A small handful of seeds gives you about 3 grams of fiber. Try sunflower seeds.    Keep Track of Your Fiber  A healthy diet includes 31 grams of fiber a day if you have a 2,000-calorie diet. Keep track of how much fiber you eat. Start by reading food labels. Then eat a variety of foods high in fiber. Ask your doctor about supplemental fiber products.            [id number] [employer], [address] [place] [address], [place] [address]. All rights reserved. This information is not intended as a substitute for professional medical care. Always follow your healthcare professional's instructions.

## 2022-06-17 NOTE — LETTER
May 25, 2022      Sarahy Meng  [address]  [facility] [id number]        Dear Sarahy,           Please be aware that coverage of these services is subject to the terms and limitations of your health insurance plan.  Call member services at your health plan with any benefit or coverage questions.    Thank you for choosing [facility]. You are scheduled for the following service(s):    Date:  6/17/22             Procedure:  COLONOSCOPY  Doctor:        Kali Torres   Arrival Time:  08:45 AM Enter and check in at the Main Hospital Entrance  Procedure Time:  09:30 AM      Location:   [facility]        Endoscopy Department, First Floor         [address] [place], [place] [address]      [phone] or [phone] ([facility]) to reschedule      MIRALAX -GATORADE  PREP  Colonoscopy is the most accurate test to detect colon polyps and colon cancer; and the only test where polyps can be removed. During this procedure, a doctor examines the lining of your large intestine and rectum through a flexible tube.   Transportation  You must arrange for a ride for the day of your procedure with a responsible adult. A taxi , Uber, etc, is not an option unless you are accompanied by a responsible adult. If you fail to arrange transportation with a responsible adult, your procedure will be cancelled and rescheduled.    Purchase the  following supplies at your local pharmacy:  - 2 (two) bisacodyl tablets: each tablet contains 5 mg.  (Dulcolax  laxative NOT Dulcolax  stool softener)   - 1 (one) 8.3 oz bottle of Polyethylene Glycol (PEG) 3350 Powder   (MiraLAX , Smooth LAX , ClearLAX  or equivalent)  - 64 oz Gatorade    Regular Gatorade, Gatorade G2 , Powerade , Powerade Zero  or Pedialyte  is acceptable. Red colored flavors are not allowed; all other colors (yellow, green, orange, purple and blue) are okay. It is also okay to buy two 2.12 oz packets of powdered  Gatorade that can be mixed with water to a total volume of 64 oz of liquid.  - 1 (one) 10 oz bottle of Magnesium Citrate (Red colored flavors are not allowed)  It is also okay for you to use a 0.5 oz package of powdered magnesium citrate (17 g) mixed with 10 oz of water.      PREPARATION FOR COLONOSCOPY    7 days before:    Discontinue fiber supplements and medications containing iron. This includes Metamucil  and Fibercon ; and multivitamins with iron.  3 days before:    Begin a low-fiber diet. A low-fiber diet helps making the cleanout more effective.     Examples of a low-fiber diet include (but are not limited to): white bread, white rice, pasta, crackers, fish, chicken, eggs, ground beef, creamy peanut butter, cooked/steamed/boiled vegetables, canned fruit, bananas, melons, milk, plain yogurt cheese, salad dressing and other condiments.     The following are not allowed on a low-fiber diet: seeds, nuts, popcorn, bran, whole wheat, corn, quinoa, raw fruits and vegetables, berries and dried fruit, beans and lentils.    For additional details on low-fiber diet, please refer to the table on the last page.    2 days before:    Continue the low-fiber diet.     Drink at least 8 glasses of water throughout the day.     Stop eating solid foods at 11:45 pm.    1 day before:    In the morning: begin a clear liquid diet (liquids you can see through).     Examples of a clear liquid diet include: water, clear broth or bouillon, Gatorade, Pedialyte or Powerade, carbonated and non-carbonated soft drinks (Sprite , 7-Up , ginger ale), strained fruit juices without pulp (apple, white grape, white cranberry), Jell-O  and popsicles.     The following are not allowed on a clear liquid diet: red liquids, alcoholic beverages, dairy products (milk, creamer, and yogurt), protein shakes, creamy broths, juice with pulp and chewing tobacco.    At noon: take 2 (two) bisacodyl tablets     At 4 (and no later than 6pm): start drinking the  Miralax-Gatorade preparation (8.3 oz of Miralax mixed with 64 oz of Gatorade in a large pitcher). Drink 1(one) 8 oz glass every 15 minutes thereafter, until the mixture is gone.  COLON CLEANSING TIPS: drink adequate amounts of fluids before and after your colon cleansing to prevent dehydration. Stay near a toilet because you will have diarrhea. Even if you are sitting on the toilet, continue to drink the cleansing solution every 15 minutes. If you feel nauseous or vomit, rinse your mouth with water, take a 15 to 30-minute-break and then continue drinking the solution. You will be uncomfortable until the stool has flushed from your colon (in about 2 to 4 hours). You may feel chilled.    Day of your procedure  You may take your morning medications including blood pressure medications, methadone, anti-seizure medications with sips of water 3 hours prior to your procedure or earlier. Do not take insulin, blood thinners (unless specifically told by your primary care provider) or vitamins prior to your procedure. Continue the clear liquid diet.     4 hours prior: drink 10 oz of magnesium citrate. It may be easier to drink it with a straw.    STOP consuming all liquids after that.     Do not take anything by mouth during this time.     Allow extra time to travel to your procedure as you may need to stop and use a restroom along the way.    You are ready for the procedure, if you followed all instructions and your stool is no longer formed, but clear or yellow liquid. If you are unsure whether your colon is clean, please call our office at [phone] before you leave for your appointment.    Bring the following to your procedure:  - Insurance Card/Photo ID.   - List of current medications including over-the-counter medications and supplements.   - Your rescue inhaler if you currently use one to control asthma.    Canceling or rescheduling your appointment:   If you must cancel or reschedule your appointment, please call  [phone] as soon as possible.  COLONOSCOPY PRE-PROCEDURE CHECKLIST    If you have diabetes, ask your regular doctor for diet and medication restrictions.  If you take an anticoagulant or anti-platelet medication (such as Coumadin , Lovenox , Pradaxa , Xarelto , Eliquis , etc.), please call your primary doctor for advice on holding this medication.  If you take aspirin you may continue to do so.  If you are or may be pregnant, please discuss the risks and benefits of this procedure with your doctor.        What happens during a colonoscopy?    Plan to spend up to two hours, starting at registration time, at the endoscopy center the day of your procedure. The colonoscopy takes an average of 15 to 30 minutes. Recovery time is about 30 minutes.      Before the exam:    You will change into a gown.    Your medical history and medication list will be reviewed with you, unless that has been done over the phone prior to the procedure.     A nurse will insert an intravenous (IV) line into your hand or arm.    The doctor will meet with you and will give you a consent form to sign.  During the exam:     Medicine will be given through the IV line to help you relax.     Your heart rate and oxygen levels will be monitored. If your blood pressure is low, you may be given fluids through the IV line.     The doctor will insert a flexible hollow tube, called a colonoscope, into your rectum. The scope will be advanced slowly through the large intestine (colon).    You may have a feeling of fullness or pressure.     If an abnormal tissue or a polyp is found, the doctor may remove it through the endoscope for closer examination, or biopsy. Tissue removal is painless    After the exam:           Any tissue samples removed during the exam will be sent to a lab for evaluation. It may take 5-7 working days for you to be notified of the results.     A nurse will provide you with complete discharge instructions before you leave the  endoscopy center. Be sure to ask the nurse for specific instructions if you take blood thinners such as Aspirin, Coumadin or Plavix.     The doctor will prepare a full report for you and for the physician who referred you for the procedure.     Your doctor will talk with you about the initial results of your exam.      Medication given during the exam will prohibit you from driving for the rest of the day.     Following the exam, you may resume your normal diet. Your first meal should be light, no greasy foods. Avoid alcohol until the next day.     You may resume your regular activities the day after the procedure.         LOW-FIBER DIET    Foods RECOMMENDED Foods to AVOID   Breads, Cereal, Rice and Pasta:   White bread, rolls, biscuits, croissant and sherrie toast.   Waffles, Singaporean toast and pancakes.   White rice, noodles, pasta, macaroni and peeled cooked potatoes.   Plain crackers and saltines.   Cooked cereals: farina, cream of rice.   Cold cereals: Puffed Rice , Rice Krispies , Corn Flakes  and Special K    Breads, Cereal, Rice and Pasta:   Breads or rolls with nuts, seeds or fruit.   Whole wheat, pumpernickel, rye breads and cornbread.   Potatoes with skin, brown or wild rice, and kasha (buckwheat).     Vegetables:   Tender cooked and canned vegetables without seeds: carrots, asparagus tips, green or wax beans, pumpkin, spinach, lima beans. Vegetables:   Raw or steamed vegetables.   Vegetables with seeds.   Sauerkraut.   Winter squash, peas, broccoli, Brussel sprouts, cabbage, onions, cauliflower, baked beans, peas and corn.   Fruits:   Strained fruit juice.   Canned fruit, except pineapple.   Ripe bananas and melon. Fruits:   Prunes and prune juice.   Raw fruits.   Dried fruits: figs, dates and raisins.   Milk/Dairy:   Milk: plain or flavored.   Yogurt, custard and ice cream.   Cheese and cottage cheese Milk/Dairy:     Meat and other proteins:   ground, well-cooked tender beef, lamb, ham, veal, pork, fish,  poultry and organ meats.   Eggs.   Peanut butter without nuts. Meat and other proteins:   Tough, fibrous meats with gristle.   Dry beans, peas and lentils.   Peanut butter with nuts.   Tofu.   Fats, Snack, Sweets, Condiments and Beverages:   Margarine, butter, oils, mayonnaise, sour cream and salad dressing, plain gravy.   Sugar, hard candy, clear jelly, honey and syrup.   Spices, cooked herbs, bouillon, broth and soups made with allowed vegetable, ketchup and mustard.   Coffee, tea and carbonated drinks.   Plain cakes, cookies and pretzels.   Gelatin, plain puddings, custard, ice cream, sherbet and popsicles. Fats, Snack, Sweets, Condiments and Beverages:   Nuts, seeds and coconut.   Jam, marmalade and preserves.   Pickles, olives, relish and horseradish.   All desserts containing nuts, seeds, dried fruit and coconut; or made from whole grains or bran.   Candy made with nuts or seeds.   Popcorn.   DIRECTIONS TO THE ENDOSCOPY DEPARTMENT    From the north ([facility])  Take [address] South, exit on [address] [address]. Get into the left hand sam, turn left (east), go one-half mile to [address] and turn left. Go north to the second stoplight, take a right on [address] and follow it to the Main Hospital entrance.    From the south ([facility])  Take 35N to the [address]E split and exit on [address] [address]. On [address] [address], turn left (west) to [address]. Turn right (north) on [address]. Go north to the second stoplight, take a right on [address] and follow it to the Main Hospital entrance.    From the east via 35E ([facility])  Take [address] south to [address] [address] exit. Turn right on [address] [address]. Go west to [address]. Turn right (north) on [address]. Go to the second stoplight, take a right on [address] to the Main Hospital entrance.    From the east via [address] ([facility])  Take [address] to [address]. Turn left (south)  on [address] to [address], turn left (east) on [address] and follow it to the Main Hospital entrance.    From the west via [address] ([place], [place])  Take [address] east to [address]. Turn right (south) on [address] to [address], turn left (east) on [address] and follow it to the Main Hospital entrance.

## 2022-06-20 LAB
PATH REPORT.COMMENTS IMP SPEC: NORMAL
PATH REPORT.COMMENTS IMP SPEC: NORMAL
PATH REPORT.FINAL DX SPEC: NORMAL
PATH REPORT.GROSS SPEC: NORMAL
PATH REPORT.MICROSCOPIC SPEC OTHER STN: NORMAL
PATH REPORT.RELEVANT HX SPEC: NORMAL
PHOTO IMAGE: NORMAL

## 2022-10-15 ENCOUNTER — HEALTH MAINTENANCE LETTER (OUTPATIENT)
Age: 47
End: 2022-10-15

## 2023-04-20 ENCOUNTER — PATIENT OUTREACH (OUTPATIENT)
Dept: CARE COORDINATION | Facility: CLINIC | Age: 48
End: 2023-04-20
Payer: COMMERCIAL

## 2023-04-27 ENCOUNTER — HOSPITAL ENCOUNTER (OUTPATIENT)
Dept: MAMMOGRAPHY | Facility: CLINIC | Age: 48
Discharge: HOME | End: 2023-04-27
Attending: FAMILY MEDICINE | Admitting: FAMILY MEDICINE
Payer: COMMERCIAL

## 2023-04-27 DIAGNOSIS — Z12.31 VISIT FOR SCREENING MAMMOGRAM: ICD-10-CM

## 2023-04-27 PROCEDURE — 77067 SCR MAMMO BI INCL CAD: CPT

## 2023-06-01 ENCOUNTER — HEALTH MAINTENANCE LETTER (OUTPATIENT)
Age: 48
End: 2023-06-01

## 2024-02-14 ENCOUNTER — OFFICE VISIT (OUTPATIENT)
Dept: FAMILY MEDICINE | Facility: CLINIC | Age: 49
End: 2024-02-14
Payer: COMMERCIAL

## 2024-02-14 VITALS
SYSTOLIC BLOOD PRESSURE: 188 MMHG | RESPIRATION RATE: 14 BRPM | HEIGHT: 60 IN | BODY MASS INDEX: 32.39 KG/M2 | DIASTOLIC BLOOD PRESSURE: 79 MMHG | HEART RATE: 65 BPM | OXYGEN SATURATION: 96 % | TEMPERATURE: 99.2 F | WEIGHT: 165 LBS

## 2024-02-14 DIAGNOSIS — I10 ESSENTIAL HYPERTENSION: ICD-10-CM

## 2024-02-14 DIAGNOSIS — Z13.220 SCREENING, LIPID: ICD-10-CM

## 2024-02-14 DIAGNOSIS — Z00.00 ROUTINE GENERAL MEDICAL EXAMINATION AT A HEALTH CARE FACILITY: Primary | ICD-10-CM

## 2024-02-14 DIAGNOSIS — Z12.4 CERVICAL CANCER SCREENING: ICD-10-CM

## 2024-02-14 DIAGNOSIS — Z13.0 SCREENING FOR BLOOD DISEASE: ICD-10-CM

## 2024-02-14 DIAGNOSIS — Z13.1 SCREENING FOR DIABETES MELLITUS: ICD-10-CM

## 2024-02-14 LAB
ERYTHROCYTE [DISTWIDTH] IN BLOOD BY AUTOMATED COUNT: 11.9 % (ref 10–15)
HBA1C MFR BLD: 5.4 % (ref 0–5.6)
HCT VFR BLD AUTO: 42.2 % (ref 35–47)
HGB BLD-MCNC: 14.1 G/DL (ref 11.7–15.7)
MCH RBC QN AUTO: 33.3 PG (ref 26.5–33)
MCHC RBC AUTO-ENTMCNC: 33.4 G/DL (ref 31.5–36.5)
MCV RBC AUTO: 100 FL (ref 78–100)
PLATELET # BLD AUTO: 318 10E3/UL (ref 150–450)
RBC # BLD AUTO: 4.24 10E6/UL (ref 3.8–5.2)
WBC # BLD AUTO: 6.9 10E3/UL (ref 4–11)

## 2024-02-14 PROCEDURE — G0145 SCR C/V CYTO,THINLAYER,RESCR: HCPCS | Performed by: PHYSICIAN ASSISTANT

## 2024-02-14 PROCEDURE — 36415 COLL VENOUS BLD VENIPUNCTURE: CPT | Performed by: PHYSICIAN ASSISTANT

## 2024-02-14 PROCEDURE — 93000 ELECTROCARDIOGRAM COMPLETE: CPT | Performed by: PHYSICIAN ASSISTANT

## 2024-02-14 PROCEDURE — 99214 OFFICE O/P EST MOD 30 MIN: CPT | Mod: 25 | Performed by: PHYSICIAN ASSISTANT

## 2024-02-14 PROCEDURE — 80053 COMPREHEN METABOLIC PANEL: CPT | Performed by: PHYSICIAN ASSISTANT

## 2024-02-14 PROCEDURE — 87624 HPV HI-RISK TYP POOLED RSLT: CPT | Performed by: PHYSICIAN ASSISTANT

## 2024-02-14 PROCEDURE — 84443 ASSAY THYROID STIM HORMONE: CPT | Performed by: PHYSICIAN ASSISTANT

## 2024-02-14 PROCEDURE — 85027 COMPLETE CBC AUTOMATED: CPT | Performed by: PHYSICIAN ASSISTANT

## 2024-02-14 PROCEDURE — 80061 LIPID PANEL: CPT | Performed by: PHYSICIAN ASSISTANT

## 2024-02-14 PROCEDURE — 83036 HEMOGLOBIN GLYCOSYLATED A1C: CPT | Performed by: PHYSICIAN ASSISTANT

## 2024-02-14 PROCEDURE — 99396 PREV VISIT EST AGE 40-64: CPT | Mod: 25 | Performed by: PHYSICIAN ASSISTANT

## 2024-02-14 RX ORDER — PHENTERMINE HYDROCHLORIDE 37.5 MG/1
37.5 TABLET ORAL
Qty: 30 TABLET | Refills: 1 | Status: CANCELLED | OUTPATIENT
Start: 2024-02-14

## 2024-02-14 RX ORDER — LISINOPRIL 10 MG/1
10 TABLET ORAL DAILY
Qty: 30 TABLET | Refills: 2 | Status: SHIPPED | OUTPATIENT
Start: 2024-02-14 | End: 2024-03-27

## 2024-02-14 SDOH — HEALTH STABILITY: PHYSICAL HEALTH: ON AVERAGE, HOW MANY DAYS PER WEEK DO YOU ENGAGE IN MODERATE TO STRENUOUS EXERCISE (LIKE A BRISK WALK)?: 0 DAYS

## 2024-02-14 ASSESSMENT — LIFESTYLE VARIABLES
HOW OFTEN DO YOU HAVE SIX OR MORE DRINKS ON ONE OCCASION: LESS THAN MONTHLY
HOW OFTEN DO YOU HAVE A DRINK CONTAINING ALCOHOL: 2-3 TIMES A WEEK
AUDIT-C TOTAL SCORE: 5
HOW MANY STANDARD DRINKS CONTAINING ALCOHOL DO YOU HAVE ON A TYPICAL DAY: 3 OR 4
SKIP TO QUESTIONS 9-10: 0

## 2024-02-14 ASSESSMENT — PATIENT HEALTH QUESTIONNAIRE - PHQ9
SUM OF ALL RESPONSES TO PHQ QUESTIONS 1-9: 16
10. IF YOU CHECKED OFF ANY PROBLEMS, HOW DIFFICULT HAVE THESE PROBLEMS MADE IT FOR YOU TO DO YOUR WORK, TAKE CARE OF THINGS AT HOME, OR GET ALONG WITH OTHER PEOPLE: VERY DIFFICULT
SUM OF ALL RESPONSES TO PHQ QUESTIONS 1-9: 16

## 2024-02-14 ASSESSMENT — SOCIAL DETERMINANTS OF HEALTH (SDOH)
HOW OFTEN DO YOU ATTEND CHURCH OR RELIGIOUS SERVICES?: NEVER
DO YOU BELONG TO ANY CLUBS OR ORGANIZATIONS SUCH AS CHURCH GROUPS UNIONS, FRATERNAL OR ATHLETIC GROUPS, OR SCHOOL GROUPS?: NO
HOW OFTEN DO YOU ATTENT MEETINGS OF THE CLUB OR ORGANIZATION YOU BELONG TO?: 1 TO 4 TIMES PER YEAR
IN A TYPICAL WEEK, HOW MANY TIMES DO YOU TALK ON THE PHONE WITH FAMILY, FRIENDS, OR NEIGHBORS?: TWICE A WEEK
HOW OFTEN DO YOU GET TOGETHER WITH FRIENDS OR RELATIVES?: ONCE A WEEK
HOW OFTEN DO YOU GET TOGETHER WITH FRIENDS OR RELATIVES?: ONCE A WEEK

## 2024-02-14 NOTE — PROGRESS NOTES
"Preventive Care Visit  [facility]  Ramona Ann Aaseby-Aguilera, PA-C, Family Medicine  Feb 14, 2024    Assessment & Plan     Routine general medical examination at a health care facility      Essential hypertension  New diagnosis.  Will start on lisinopril 10 mg .  Risks, benefits, side effects and intended purposes discussed.     Will have come in for nurse only BP check x 2 and then follow-up for office visit in 1 month with BP diary.      - Lipid Profile  - Comprehensive metabolic panel  - TSH with free T4 reflex  - CBC with platelets  - lisinopril (ZESTRIL) 10 MG tablet; Take 1 tablet (10 mg) by mouth daily  - EKG 12-lead complete w/read - Clinics    Cervical cancer screening    - Pap Screen with HPV - recommended age 30 - 65 years        Screening for diabetes mellitus    - Comprehensive metabolic panel  - Hemoglobin A1c    Screening, lipid    - Lipid Profile    Screening for blood disease    - CBC with platelets            BMI  Estimated body mass index is 32.49 kg/m  as calculated from the following:    Height as of this encounter: 1.518 m (4' 11.75\").    Weight as of this encounter: 74.8 kg (165 lb).       Depression Screening Follow Up        2/14/2024    10:52 AM   PHQ   PHQ-9 Total Score 16   Q9: Thoughts of better off dead/self-harm past 2 weeks Not at all         Follow Up Actions Taken  Crisis resource information provided in After Visit Summary     Counseling  Appropriate preventive services were discussed with this patient, including applicable screening as appropriate for fall prevention, nutrition, physical activity, Tobacco-use cessation, weight loss and cognition.  Checklist reviewing preventive services available has been given to the patient.  Reviewed patient's diet, addressing concerns and/or questions.   The patient's PHQ-9 score is consistent with moderate depression. She was provided with information regarding depression.     Patient has been advised of split billing " requirements and indicates understanding: Yes        Kvng Weathers is a 48 year old, presenting for the following:  Physical        2/14/2024    10:54 AM   Additional Questions   Roomed by Sinan Aldana   Accompanied by self       Health Care Directive  Patient does not have a Health Care Directive or Living Will: Discussed advance care planning with patient; information given to patient to review.    HPI    Also, she has additional complaints of high blood pressure.            2/14/2024   General Health   How would you rate your overall physical health? (!) POOR   Feel stress (tense, anxious, or unable to sleep) Very much    Very much   (!) STRESS CONCERN      2/14/2024   Nutrition   Three or more servings of calcium each day? Yes   Diet: Regular (no restrictions)    Low salt    Breakfast skipped   How many servings of fruit and vegetables per day? (!) 2-3   How many sweetened beverages each day? 0-1         2/14/2024   Exercise   Days per week of moderate/strenous exercise 0 days    0 days   (!) EXERCISE CONCERN      2/14/2024   Social Factors   Frequency of gathering with friends or relatives Once a week    Once a week   Worry food won't last until get money to buy more No    No   Food not last or not have enough money for food? No    No   Do you have housing?  Yes    Yes   Are you worried about losing your housing? No    No   Lack of transportation? No    No   Unable to get utilities (heat,electricity)? No    No         2/14/2024   Fall Risk   Fallen 2 or more times in the past year? No   Trouble with walking or balance? Yes           2/14/2024   Dental   Dentist two times every year? Yes         2/14/2024   TB Screening   Were you born outside of [place]?  No       Today's PHQ-9 Score:       2/14/2024    10:52 AM   PHQ-9 SCORE   PHQ-9 Total Score MyChart 16 (Moderately severe depression)   PHQ-9 Total Score 16         2/14/2024   Substance Use   Frequency of drinking alcohol? 2-3 times a week   Alcohol more  than 3/day or more than 7/wk Not Applicable   Do you use any other substances recreationally? (!) CANNABIS PRODUCTS     Social History     Tobacco Use    Smoking status: Former     Years: 15     Types: Cigarettes     Quit date: 2022     Years since quittin.7    Smokeless tobacco: Never    Tobacco comments:     6 ciggs a day   Vaping Use    Vaping Use: Never used   Substance Use Topics    Alcohol use: Yes     Comment: 3 drinks/week    Drug use: Not Currently     Types: Marijuana             2024   Breast Cancer Screening   Family history of breast, colon, or ovarian cancer? No / Unknown          No data to display                 Mammogram Screening - Patient under 40 years of age: Routine Mammogram Screening not recommended.         2024   STI Screening   New sexual partner(s) since last STI/HIV test? No     History of abnormal Pap smear: NO - age 30-65 PAP every 5 years with negative HPV co-testing recommended        Latest Ref Rng & Units 2018     3:45 PM 2018     3:41 PM 2015    12:00 AM   PAP / HPV   PAP (Historical)   NIL  NIL    HPV 16 DNA NEG^Negative Negative      HPV 18 DNA NEG^Negative Negative      Other HR HPV NEG^Negative Negative        The ASCVD Risk score (Haritha GOMEZ, et al., 2019) failed to calculate for the following reasons:    The systolic blood pressure is missing            2024   Contraception/Family Planning   Questions about contraception or family planning No        Reviewed and updated as needed this visit by Provider                    BP Readings from Last 3 Encounters:   24 (!) 188/79   22 (!) 128/95   22 (!) 160/88    Wt Readings from Last 3 Encounters:   24 74.8 kg (165 lb)   22 74.4 kg (164 lb)   22 74.4 kg (164 lb)                  Patient Active Problem List   Diagnosis    Esophageal reflux    Tobacco abuse    Obesity    CARDIOVASCULAR SCREENING; LDL GOAL LESS THAN 160    Anxiety    Encounter for insertion of  intrauterine contraceptive device    Laceration of right kidney, initial encounter    Closed head injury, initial encounter    Traumatic perinephric hematoma of right kidney, initial encounter    Closed fracture of multiple ribs of right side, initial encounter     Past Surgical History:   Procedure Laterality Date    COLONOSCOPY N/A 2022    Procedure: COLONOSCOPY with polypectomy using cold snare;  Surgeon: Tung Jimenez MD;  Location:  GI    [facility] NONSPECIFIC PROCEDURE       - boy 89    [facility] NONSPECIFIC PROCEDURE      [employer] [facility]        Social History     Tobacco Use    Smoking status: Former     Years: 15     Types: Cigarettes     Quit date: 2022     Years since quittin.7    Smokeless tobacco: Never    Tobacco comments:     6 ciggs a day   Substance Use Topics    Alcohol use: Yes     Comment: 3 drinks/week     Family History   Problem Relation Age of Onset    Arthritis Mother     Hypertension Mother     Thyroid Disease Mother     Clotting Disorder Mother         blood    Colitis Mother     Colon Polyps Father     Heart Disease Maternal Grandfather     Diabetes Maternal Grandfather     Cerebrovascular Disease Maternal Grandfather     Arthritis Maternal Grandfather     Cancer Maternal Grandfather         lung    Breast Cancer No family hx of     Cancer - colorectal No family hx of     Colon Cancer No family hx of          Current Outpatient Medications   Medication Sig Dispense Refill    acetaminophen (TYLENOL) 500 MG tablet Take 2 tablets (1,000 mg) by mouth every 6 hours as needed for mild pain 100 tablet 0    ibuprofen (ADVIL/MOTRIN) 600 MG tablet Take 1 tablet (600 mg) by mouth every 6 hours as needed (mild to moderate pain) 100 tablet 0    levonorgestrel (MIRENA) 20 MCG/24HR IUD 1 each (20 mcg) by Intrauterine route once       Allergies   Allergen Reactions    No Known Drug Allergy      Recent Labs   Lab Test 22  1012 22  2345 22  0556 22  1214  "04/18/22  0750 04/11/21  0944 03/11/20  1656 01/10/19  0823   LDL  --   --   --   --  148* 193*  --  180*   HDL  --   --   --   --  33* 37*  --  33*   TRIG  --   --   --   --  231* 142  --  136   ALT 66* 61* 74*   < > 29 49  --  38   CR  --  0.60 0.64   < > 0.53 0.56  --  0.57   GFRESTIMATED  --  >90 >90   < > >90 >90  --  >90   GFRESTBLACK  --   --   --   --   --  >90  --  >90   POTASSIUM  --  3.4 3.8   < > 4.5 4.3  --  4.1   TSH  --   --   --   --  1.37 0.92   < > 1.09    < > = values in this interval not displayed.          Review of Systems  CONSTITUTIONAL: NEGATIVE for fever, chills, change in weight  INTEGUMENTARY/SKIN: NEGATIVE for worrisome rashes, moles or lesions  EYES: NEGATIVE for vision changes or irritation  ENT/MOUTH: NEGATIVE for ear, mouth and throat problems  RESP: NEGATIVE for significant cough or SOB  BREAST: NEGATIVE for masses, tenderness or discharge  CV: NEGATIVE for chest pain, palpitations or peripheral edema  GI: NEGATIVE for nausea, abdominal pain, heartburn, or change in bowel habits  : NEGATIVE for frequency, dysuria, or hematuria  MUSCULOSKELETAL: NEGATIVE for significant arthralgias or myalgia  NEURO: NEGATIVE for weakness, dizziness or paresthesias  ENDOCRINE: NEGATIVE for temperature intolerance, skin/hair changes  HEME: NEGATIVE for bleeding problems  PSYCHIATRIC: NEGATIVE for changes in mood or affect     Objective    Exam  There were no vitals taken for this visit.   Estimated body mass index is 32.3 kg/m  as calculated from the following:    Height as of 6/17/22: 1.518 m (4' 11.75\").    Weight as of 6/17/22: 74.4 kg (164 lb).    Physical Exam  GENERAL: alert and no distress  EYES: Eyes grossly normal to inspection, PERRL and conjunctivae and sclerae normal  HENT: ear canals and TM's normal, nose and mouth without ulcers or lesions  NECK: no adenopathy, no asymmetry, masses, or scars  RESP: lungs clear to auscultation - no rales, rhonchi or wheezes  CV: regular rate and rhythm, " normal S1 S2, no S3 or S4, no murmur, click or rub, no peripheral edema  ABDOMEN: soft, nontender, no hepatosplenomegaly, no masses and bowel sounds normal   (female) w/bimanual: normal female external genitalia, normal urethral meatus, normal vaginal mucosa, and normal cervix/adnexa/uterus without masses or discharge  MS: no gross musculoskeletal defects noted, no edema  SKIN: no suspicious lesions or rashes  NEURO: Normal strength and tone, mentation intact and speech normal  PSYCH: mentation appears normal, affect normal/bright      Signed Electronically by: Ramona Ann Aaseby-Aguilera, PA-C    Answers submitted by the patient for this visit:  Patient Health Questionnaire (Submitted on 2/14/2024)  If you checked off any problems, how difficult have these problems made it for you to do your work, take care of things at home, or get along with other people?: Very difficult  PHQ9 TOTAL SCORE: 16

## 2024-02-14 NOTE — COMMUNITY RESOURCES LIST (ENGLISH)
02/14/2024    "Shahab P. Tabatabai, Broker" [place] [employer]  N/A  For questions about this resource list or additional care needs, please contact your primary care clinic or care manager.  Phone: [phone]   Email: N/A   Address: [address] [place] [id number]   Hours: N/A        Exercise and Recreation       Gym or workout facility  1  [address] [place] [address] - Kickboxing Classes Distance: 5.52 miles      In-Person   [address] [place] [address]  Language: English  Hours: Mon - Fri 6:00 AM - 12:30 PM , Mon - Fri 3:00 PM - 8:00 PM , Sat 8:00 AM - 12:00 PM  Fees: Self Pay   Phone: [phone]) [phone] Website: [email or web address]     2  [employer] [facility] [place] Distance: 8.22 miles      In-Person   [address] [place] [address]  Language: English  Hours: Mon - Sun Open 24 Hours  Fees: Insurance, Self Pay, Sliding Fee   Phone: [phone]) [phone] Email: [email or web address] Website: [email or web address]          Important Numbers & Websites       Emergency Services   911  [facility]   311  Poison Control   (970) 117-4699  Suicide Prevention Lifeline   (556) 470-9437 (TALK)  Child Abuse Hotline   (268) 495-1850 (4-A-Child)  Sexual Assault Hotline   (218) 636-3766 (HOPE)  National Runaway Safeline   (765) 937-3425 (RUNAWAY)  All-Options Talkline   (122) 462-4903  Substance Abuse Referral   (236) 213-9390 (HELP)

## 2024-02-14 NOTE — PATIENT INSTRUCTIONS
"High blood pressure:  start on lisinopril 10 mg Risks, benefits, side effects and intended purposes discussed.     Will have come in for nurse only BP check x 2 and then follow-up for office visit in 1 month with BP diary.        What lifestyle changes can I make to help improve my cholesterol levels?     Exercise regularly.   Exercise can raise HDL cholesterol levels and reduce levels of LDL cholesterol and triglycerides. If you haven't been exercising, try to work up to 30 minutes, 4 to 6 times a week.     Lose weight if you are overweight.   Being overweight can raise your cholesterol levels. Losing weight, even just 5 or 10 pounds, can lower your total cholesterol, LDL cholesterol and triglyceride levels.     Eat a heart-healthy diet.   Eat plenty of fresh fruits and vegetables. Fruits and vegetables are naturally low in fat. Not only do they add flavor and variety to your diet, but they are also the best source of fiber, vitamins and minerals for your body. Aim for 5 cups of fruits and vegetables every day, not counting potatoes, corn and rice. Potatoes, corn and rice count as carbohydrates.     Pick  good  fats over  bad  fats. Fat is part of a healthy diet, but you need to know the difference between  bad  fats and  good  fats. \"Bad  fats, such as saturated and trans fats, are found in foods such as butter; coconut and palm oil; saturated or partially hydrogenated vegetable fats such as shortening and margarine; animal fats in meats; and fats in whole milk dairy products. Limit the amount of saturated fat in your diet, and avoid trans fat completely. Unsaturated fat is the  good  fat. Most fats in fish, vegetables, grains and tree nuts are unsaturated. Try to eat unsaturated fat in place of saturated fat. For example, you can use olive oil or canola oil in cooking instead of butter.     Use healthier cooking methods. Baking, broiling and roasting are the healthiest ways to prepare meat, poultry and other " foods. Trim any outside fat or skin before cooking. Lean cuts can be pan-broiled or stir-fried. Use either a nonstick pan or nonstick cooking spray instead of adding fats such as butter or margarine. When eating out, ask how food is prepared. You can request that your food be baked, broiled or roasted, rather than fried.     Look for other sources of protein. Fish, dry beans, tree nuts, peas and lentils offer protein, nutrients and fiber without the cholesterol and saturated fats that meats have. Consider eating one  meatless  meal each week. Try substituting beans for meat in a favorite recipe, such as lasagna or chili. Snack on a handful of almonds or pecans. Soy is also an excellent source of protein. Good examples of soy include soy milk, edamame (green soy beans), tofu and soy protein shakes.     Get more fiber in your diet. Add good sources of fiber to your meals. Examples include fruits, vegetables, whole grains (such as oat bran, whole and rolled oats and barley), legumes (such as beans and peas) and nuts and seeds (such as ground flax seed). In addition to fiber, whole grains supply B-vitamins and important nutrients not found in foods made with white flour.     Eat more fish. Fish are an excellent source of omega-3 fatty acids. Wild-caught oily fish, such as salmon, tuna, mackerel and sardines, are the best sources of omega-3s, but all fish contain some amount of this beneficial fatty acid. Aim for 2 6-oz servings each week.             Your Health Risk Assessment indicates you feel you are not in good health    A healthy lifestyle helps keep the body fit and the mind alert. It helps protect you from disease, helps you fight disease, and helps prevent chronic disease (disease that doesn't go away) from getting worse. This is important as you get older and begin to notice twinges in muscles and joints and a decline in the strength and stamina you once took for granted. A healthy lifestyle includes good  healthcare, good nutrition, weight control, recreation, and regular exercise. Avoid harmful substances and do what you can to keep safe. Another part of a healthy lifestyle is stay mentally active and socially involved.    Good healthcare   Have a wellness visit every year.   If you have new symptoms, let us know right away. Don't wait until the next checkup.   Take medicines exactly as prescribed and keep your medicines in a safe place. Tell us if your medicine causes problems.   Healthy diet and weight control   Eat 3 or 4 small, nutritious, low-fat, high-fiber meals a day. Include a variety of fruits, vegetables, and whole-grain foods.   Make sure you get enough calcium in your diet. Calcium, vitamin D, and exercise help prevent osteoporosis (bone thinning).   If you live alone, try eating with others when you can. That way you get a good meal and have company while you eat it.   Try to keep a healthy weight. If you eat more calories than your body uses for energy, it will be stored as fat and you will gain weight.     Recreation   Recreation is not limited to sports and team events. It includes any activity that provides relaxation, interest, enjoyment, and exercise. Recreation provides an outlet for physical, mental, and social energy. It can give a sense of worth and achievement. It can help you stay healthy.    Mental Exercise and Social Involvement  Mental and emotional health is as important as physical health. Keep in touch with friends and family. Stay as active as possible. Continue to learn and challenge yourself.   Things you can do to stay mentally active are:  Learn something new, like a foreign language or musical instrument.   Play SCRABBLE or do crossword puzzles. If you cannot find people to play these games with you at home, you can play them with others on your computer through the Internet.   Join a games club--anything from card games to chess or checkers or lawn bowling.   Start a new hobby.  "  Go back to school.   Volunteer.   Read.   Keep up with world events.  Eating Healthy Foods: Care Instructions  With every meal, you can make healthy food choices. Try to eat a variety of fruits, vegetables, whole grains, lean proteins, and low-fat dairy products. This can help you get the right balance of nutrients, including vitamins and minerals. Small changes add up over time. You can start by adding one healthy food to your meals each day.    Try to make half your plate fruits and vegetables, one-fourth whole grains, and one-fourth lean proteins. Try including dairy with your meals.   Eat more fruits and vegetables. Try to have them with most meals and snacks.   Foods for healthy eating    Fruits    These can be fresh, frozen, canned, or dried.  Try to choose whole fruit rather than fruit juice.  Eat a variety of colors.    Vegetables    These can be fresh, frozen, canned, or dried.  Beans, peas, and lentils count too.    Whole grains    Choose whole-grain breads, cereals, and noodles.  Try brown rice.    Lean proteins    These can include lean meat, poultry, fish, and eggs.  You can also have tofu, beans, peas, lentils, nuts, and seeds.    Dairy    Try milk, yogurt, and cheese.  Choose low-fat or fat-free when you can.  If you need to, use lactose-free milk or fortified plant-based milk products, such as soy milk.    Water    Drink water when you're thirsty.  Limit sugar-sweetened drinks, including soda, fruit drinks, and sports drinks.  Where can you learn more?  Go to https://www.healthGetThis.net/patiented  Enter T756 in the search box to learn more about \"Eating Healthy Foods: Care Instructions.\"  Current as of: February 28, 2023               Content Version: 13.8    8848-1871 [employer].   Care instructions adapted under license by your healthcare professional. If you have questions about a medical condition or this instruction, always ask your healthcare professional. [employer], " Incorporated disclaims any warranty or liability for your use of this information.      Preventing Falls: Care Instructions  Injuries and health problems such as trouble walking or poor eyesight can increase your risk of falling. So can some medicines. But there are things you can do to help prevent falls. You can exercise to get stronger. You can also arrange your home to make it safer.    Talk to your doctor about the medicines you take. Ask if any of them increase the risk of falls and whether they can be changed or stopped.   Try to exercise regularly. It can help improve your strength and balance. This can help lower your risk of falling.     Practice fall safety and prevention.    Wear low-heeled shoes that fit well and give your feet good support. Talk to your doctor if you have foot problems that make this hard.  Carry a cellphone or wear a medical alert device that you can use to call for help.  Use stepladders instead of chairs to reach high objects. Don't climb if you're at risk for falls. Ask for help, if needed.  Wear the correct eyeglasses, if you need them.    Make your home safer.    Remove rugs, cords, clutter, and furniture from walkways.  Keep your house well lit. Use night-lights in hallways and bathrooms.  Install and use sturdy handrails on stairways.  Wear nonskid footwear, even inside. Don't walk barefoot or in socks without shoes.    Be safe outside.    Use handrails, curb cuts, and ramps whenever possible.  Keep your hands free by using a shoulder bag or backpack.  Try to walk in well-lit areas. Watch out for uneven ground, changes in pavement, and debris.  Be careful in the winter. Walk on the grass or gravel when sidewalks are slippery. Use de-icer on steps and walkways. Add non-slip devices to shoes.    Put grab bars and nonskid mats in your shower or tub and near the toilet. Try to use a shower chair or bath bench when bathing.   Get into a tub or shower by putting in your weaker leg  "first. Get out with your strong side first. Have a phone or medical alert device in the bathroom with you.   Where can you learn more?  Go to https://www.Panjo.net/patiented  Enter G117 in the search box to learn more about \"Preventing Falls: Care Instructions.\"  Current as of: July 18, 2023               Content Version: 13.8    5447-1555 [employer].   Care instructions adapted under license by your healthcare professional. If you have questions about a medical condition or this instruction, always ask your healthcare professional. [employer] disclaims any warranty or liability for your use of this information.      Learning About Stress  What is stress?     Stress is your body's response to a hard situation. Your body can have a physical, emotional, or mental response. Stress is a fact of life for most people, and it affects everyone differently. What causes stress for you may not be stressful for someone else.  A lot of things can cause stress. You may feel stress when you go on a job interview, take a test, or run a race. This kind of short-term stress is normal and even useful. It can help you if you need to work hard or react quickly. For example, stress can help you finish an important job on time.  Long-term stress is caused by ongoing stressful situations or events. Examples of long-term stress include long-term health problems, ongoing problems at work, or conflicts in your family. Long-term stress can harm your health.  How does stress affect your health?  When you are stressed, your body responds as though you are in danger. It makes hormones that speed up your heart, make you breathe faster, and give you a burst of energy. This is called the fight-or-flight stress response. If the stress is over quickly, your body goes back to normal and no harm is done.  But if stress happens too often or lasts too long, it can have bad effects. Long-term stress can make you more likely " to get sick, and it can make symptoms of some diseases worse. If you tense up when you are stressed, you may develop neck, shoulder, or low back pain. Stress is linked to high blood pressure and heart disease.  Stress also harms your emotional health. It can make you alexander, tense, or depressed. Your relationships may suffer, and you may not do well at work or school.  What can you do to manage stress?  You can try these things to help manage stress:   Do something active. Exercise or activity can help reduce stress. Walking is a great way to get started. Even everyday activities such as housecleaning or yard work can help.  Try yoga or julio chi. These techniques combine exercise and meditation. You may need some training at first to learn them.  Do something you enjoy. For example, listen to music or go to a movie. Practice your hobby or do volunteer work.  Meditate. This can help you relax, because you are not worrying about what happened before or what may happen in the future.  Do guided imagery. Imagine yourself in any setting that helps you feel calm. You can use online videos, books, or a teacher to guide you.  Do breathing exercises. For example:  From a standing position, bend forward from the waist with your knees slightly bent. Let your arms dangle close to the floor.  Breathe in slowly and deeply as you return to a standing position. Roll up slowly and lift your head last.  Hold your breath for just a few seconds in the standing position.  Breathe out slowly and bend forward from the waist.  Let your feelings out. Talk, laugh, cry, and express anger when you need to. Talking with supportive friends or family, a counselor, or a jaleesa leader about your feelings is a healthy way to relieve stress. Avoid discussing your feelings with people who make you feel worse.  Write. It may help to write about things that are bothering you. This helps you find out how much stress you feel and what is causing it. When you  "know this, you can find better ways to cope.  What can you do to prevent stress?  You might try some of these things to help prevent stress:  Manage your time. This helps you find time to do the things you want and need to do.  Get enough sleep. Your body recovers from the stresses of the day while you are sleeping.  Get support. Your family, friends, and community can make a difference in how you experience stress.  Limit your news feed. Avoid or limit time on social media or news that may make you feel stressed.  Do something active. Exercise or activity can help reduce stress. Walking is a great way to get started.  Where can you learn more?  Go to https://www.Brightstar.net/patiented  Enter N032 in the search box to learn more about \"Learning About Stress.\"  Current as of: February 26, 2023               Content Version: 13.8    7747-2542 [employer].   Care instructions adapted under license by your healthcare professional. If you have questions about a medical condition or this instruction, always ask your healthcare professional. [employer] disclaims any warranty or liability for your use of this information.      Learning About Depression Screening  What is depression screening?  Depression screening is a way to see if you have depression symptoms. It may be done by a doctor or counselor. It's often part of a routine checkup. That's because your mental health is just as important as your physical health.  Depression is a mental health condition that affects how you feel, think, and act. You may:  Have less energy.  Lose interest in your daily activities.  Feel sad and grouchy for a long time.  Depression is very common. It affects people of all ages.  Many things can lead to depression. Some people become depressed after they have a stroke or find out they have a major illness like cancer or heart disease. The death of a loved one or a breakup may lead to depression. It can run in " "families. Most experts believe that a combination of inherited genes and stressful life events can cause it.  What happens during screening?  You may be asked to fill out a form about your depression symptoms. You and the doctor will discuss your answers. The doctor may ask you more questions to learn more about how you think, act, and feel.  What happens after screening?  If you have symptoms of depression, your doctor will talk to you about your options.  Doctors usually treat depression with medicines or counseling. Often, combining the two works best. Many people don't get help because they think that they'll get over the depression on their own. But people with depression may not get better unless they get treatment.  The cause of depression is not well understood. There may be many factors involved. But if you have depression, it's not your fault.  A serious symptom of depression is thinking about death or suicide. If you or someone you care about talks about this or about feeling hopeless, get help right away.  It's important to know that depression can be treated. Medicine, counseling, and self-care may help.  Where can you learn more?  Go to https://www.Biotectix.net/patiented  Enter T185 in the search box to learn more about \"Learning About Depression Screening.\"  Current as of: June 25, 2023               Content Version: 13.8    6140-7260 [employer].   Care instructions adapted under license by your healthcare professional. If you have questions about a medical condition or this instruction, always ask your healthcare professional. [employer] disclaims any warranty or liability for your use of this information.      Substance Use Disorder: Care Instructions  Overview     You can improve your life and health by stopping your use of alcohol or drugs. When you don't drink or use drugs, you may feel and sleep better. You may get along better with your family, friends, and " coworkers. There are medicines and programs that can help with substance use disorder.  How can you care for yourself at home?  Here are some ways to help you stay sober and prevent relapse.  If you have been given medicine to help keep you sober or reduce your cravings, be sure to take it exactly as prescribed.  Talk to your doctor about programs that can help you stop using drugs or drinking alcohol.  Do not keep alcohol or drugs in your home.  Plan ahead. Think about what you'll say if other people ask you to drink or use drugs. Try not to spend time with people who drink or use drugs.  Use the time and money spent on drinking or drugs to do something that's important to you.  Preventing a relapse  Have a plan to deal with relapse. Learn to recognize changes in your thinking that lead you to drink or use drugs. Get help before you start to drink or use drugs again.  Try to stay away from situations, friends, or places that may lead you to drink or use drugs.  If you feel the need to drink alcohol or use drugs again, seek help right away. Call a trusted friend or family member. Some people get support from organizations such as Narcotics Anonymous or [employer] or from treatment facilities.  If you relapse, get help as soon as you can. Some people make a plan with another person that outlines what they want that person to do for them if they relapse. The plan usually includes how to handle the relapse and who to notify in case of relapse.  Don't give up. Remember that a relapse doesn't mean that you have failed. Use the experience to learn the triggers that lead you to drink or use drugs. Then quit again. Recovery is a lifelong process. Many people have several relapses before they are able to quit for good.  Follow-up care is a key part of your treatment and safety. Be sure to make and go to all appointments, and call your doctor if you are having problems. It's also a good idea to know your test results and  "keep a list of the medicines you take.  When should you call for help?   Call 911  anytime you think you may need emergency care. For example, call if you or someone else:    Has overdosed or has withdrawal signs. Be sure to tell the emergency workers that you are or someone else is using or trying to quit using drugs. Overdose or withdrawal signs may include:  Losing consciousness.  Seizure.  Seeing or hearing things that aren't there (hallucinations).     Is thinking or talking about suicide or harming others.   Where to get help 24 hours a day, 7 days a week   If you or someone you know talks about suicide, self-harm, a mental health crisis, a substance use crisis, or any other kind of emotional distress, get help right away. You can:    Call the Suicide and Crisis Lifeline at 988.     Call 1-986-904-TALK (1-792.575.7676).     Text HOME to 484973 to access the Crisis Text Line.   Consider saving these numbers in your phone.  Go to [employer] for more information or to chat online.  Call your doctor now or seek immediate medical care if:    You are having withdrawal symptoms. These may include nausea or vomiting, sweating, shakiness, and anxiety.   Watch closely for changes in your health, and be sure to contact your doctor if:    You have a relapse.     You need more help or support to stop.   Where can you learn more?  Go to https://www.e[employer] Technologies.net/patiented  Enter H573 in the search box to learn more about \"Substance Use Disorder: Care Instructions.\"  Current as of: March 21, 2023               Content Version: 13.8    6244-9039 [employer].   Care instructions adapted under license by your healthcare professional. If you have questions about a medical condition or this instruction, always ask your healthcare professional. [employer] disclaims any warranty or liability for your use of this information.      Preventive Care Advice   This is general advice given by our system to " help you stay healthy. However, your care team may have specific advice just for you. Please talk to your care team about your preventive care needs.  Nutrition  Eat 5 or more servings of fruits and vegetables each day.  Try wheat bread, brown rice and whole grain pasta (instead of white bread, rice, and pasta).  Get enough calcium and vitamin D. Check the label on foods and aim for 100% of the RDA (recommended daily allowance).  Lifestyle  Exercise at least 150 minutes each week  (30 minutes a day, 5 days a week).  Do muscle strengthening activities 2 days a week. These help control your weight and prevent disease.  No smoking.  Wear sunscreen to prevent skin cancer.  Have a dental exam and cleaning every 6 months.  Yearly exams  See your health care team every year to talk about:  Any changes in your health.  Any medicines your care team has prescribed.  Preventive care, family planning, and ways to prevent chronic diseases.  Shots (vaccines)   HPV shots (up to age 26), if you've never had them before.  Hepatitis B shots (up to age 59), if you've never had them before.  COVID-19 shot: Get this shot when it's due.  Flu shot: Get a flu shot every year.  Tetanus shot: Get a tetanus shot every 10 years.  Pneumococcal, hepatitis A, and RSV shots: Ask your care team if you need these based on your risk.  Shingles shot (for age 50 and up)  General health tests  Diabetes screening:  Starting at age 35, Get screened for diabetes at least every 3 years.  If you are younger than age 35, ask your care team if you should be screened for diabetes.  Cholesterol test: At age 39, start having a cholesterol test every 5 years, or more often if advised.  Bone density scan (DEXA): At age 50, ask your care team if you should have this scan for osteoporosis (brittle bones).  Hepatitis C: Get tested at least once in your life.  STIs (sexually transmitted infections)  Before age 24: Ask your care team if you should be screened for  STIs.  After age 24: Get screened for STIs if you're at risk. You are at risk for STIs (including HIV) if:  You are sexually active with more than one person.  You don't use condoms every time.  You or a partner was diagnosed with a sexually transmitted infection.  If you are at risk for HIV, ask about PrEP medicine to prevent HIV.  Get tested for HIV at least once in your life, whether you are at risk for HIV or not.  Cancer screening tests  Cervical cancer screening: If you have a cervix, begin getting regular cervical cancer screening tests starting at age 21.  Breast cancer scan (mammogram): If you've ever had breasts, begin having regular mammograms starting at age 38. This is a scan to check for breast cancer.  Colon cancer screening: It is important to start screening for colon cancer at age 45.  Have a colonoscopy test every 10 years (or more often if you're at risk) Or, ask your provider about stool tests like a FIT test every year or Cologuard test every 3 years.  To learn more about your testing options, visit:   https://www.[email or web address].pdf.  For help making a decision, visit:   https://bit.ly/uh26761.  Prostate cancer screening test: If you have a prostate, ask your care team if a prostate cancer screening test (PSA) at age 55 is right for you.  Lung cancer screening: If you are a current or former smoker ages 50 to 80, ask your care team if ongoing lung cancer screenings are right for you.  For informational purposes only. Not to replace the advice of your health care provider. Copyright   2023 [employer]. All rights reserved. Clinically reviewed by the [facility] Transitions Program. Soundtracker [id number] - REV 01/24.

## 2024-02-14 NOTE — COMMUNITY RESOURCES LIST (ENGLISH)
02/14/2024    [employer] THERAVECTYS  N/A  For questions about this resource list or additional care needs, please contact your primary care clinic or care manager.  Phone: [phone]   Email: N/A   Address: [address] [place] [id number]   Hours: N/A        Exercise and Recreation       Gym or workout facility  1  [address] [place] [address] - Kickboxing Classes Distance: 5.52 miles      In-Person   [address] [place] [address]  Language: English  Hours: Mon - Fri 6:00 AM - 12:30 PM , Mon - Fri 3:00 PM - 8:00 PM , Sat 8:00 AM - 12:00 PM  Fees: Self Pay   Phone: [phone] Website: [email or web address]     2  [employer] [facility] [place] Distance: 8.22 miles      In-Person   [address] [place] [address]  Language: English  Hours: Mon - Sun Open 24 Hours  Fees: Insurance, Self Pay, Sliding Fee   Phone: [phone] Email: [email or web address] Website: [email or web address]          Important Numbers & Websites       Emergency Services   911  [facility] Services   311  Poison Control   (762) 544-1333  Suicide Prevention Lifeline   (439) 286-8477 (TALK)  Child Abuse Hotline   (465) 740-6391 (4-A-Child)  Sexual Assault Hotline   (610) 724-2672 (HOPE)  National Runaway Safeline   (300) 603-5750 (RUNAWAY)  All-Options Talkline   (461) 314-3661  Substance Abuse Referral   (961) 557-6972 (HELP)

## 2024-02-15 LAB
ALBUMIN SERPL BCG-MCNC: 4.5 G/DL (ref 3.5–5.2)
ALP SERPL-CCNC: 58 U/L (ref 40–150)
ALT SERPL W P-5'-P-CCNC: 20 U/L (ref 0–50)
ANION GAP SERPL CALCULATED.3IONS-SCNC: 16 MMOL/L (ref 7–15)
AST SERPL W P-5'-P-CCNC: 18 U/L (ref 0–45)
BILIRUB SERPL-MCNC: 0.4 MG/DL
BUN SERPL-MCNC: 6.3 MG/DL (ref 6–20)
CALCIUM SERPL-MCNC: 9.6 MG/DL (ref 8.6–10)
CHLORIDE SERPL-SCNC: 101 MMOL/L (ref 98–107)
CHOLEST SERPL-MCNC: 224 MG/DL
CREAT SERPL-MCNC: 0.58 MG/DL (ref 0.51–0.95)
DEPRECATED HCO3 PLAS-SCNC: 22 MMOL/L (ref 22–29)
EGFRCR SERPLBLD CKD-EPI 2021: >90 ML/MIN/1.73M2
FASTING STATUS PATIENT QL REPORTED: NO
GLUCOSE SERPL-MCNC: 91 MG/DL (ref 70–99)
HDLC SERPL-MCNC: 45 MG/DL
LDLC SERPL CALC-MCNC: 146 MG/DL
NONHDLC SERPL-MCNC: 179 MG/DL
POTASSIUM SERPL-SCNC: 3.9 MMOL/L (ref 3.4–5.3)
PROT SERPL-MCNC: 7.6 G/DL (ref 6.4–8.3)
SODIUM SERPL-SCNC: 139 MMOL/L (ref 135–145)
TRIGL SERPL-MCNC: 167 MG/DL
TSH SERPL DL<=0.005 MIU/L-ACNC: 1.45 UIU/ML (ref 0.3–4.2)

## 2024-02-16 LAB
BKR LAB AP GYN ADEQUACY: NORMAL
BKR LAB AP GYN INTERPRETATION: NORMAL
BKR LAB AP HPV REFLEX: NORMAL
BKR LAB AP PREVIOUS ABNORMAL: NORMAL
PATH REPORT.COMMENTS IMP SPEC: NORMAL
PATH REPORT.COMMENTS IMP SPEC: NORMAL
PATH REPORT.RELEVANT HX SPEC: NORMAL

## 2024-02-23 LAB
HUMAN PAPILLOMA VIRUS 16 DNA: NEGATIVE
HUMAN PAPILLOMA VIRUS 18 DNA: NEGATIVE
HUMAN PAPILLOMA VIRUS FINAL DIAGNOSIS: NORMAL
HUMAN PAPILLOMA VIRUS OTHER HR: NEGATIVE

## 2024-02-26 ENCOUNTER — ALLIED HEALTH/NURSE VISIT (OUTPATIENT)
Dept: FAMILY MEDICINE | Facility: CLINIC | Age: 49
End: 2024-02-26
Payer: COMMERCIAL

## 2024-02-26 VITALS — DIASTOLIC BLOOD PRESSURE: 80 MMHG | HEART RATE: 62 BPM | SYSTOLIC BLOOD PRESSURE: 122 MMHG | OXYGEN SATURATION: 96 %

## 2024-02-26 DIAGNOSIS — I10 ESSENTIAL HYPERTENSION: Primary | ICD-10-CM

## 2024-02-26 PROCEDURE — 99207 PR NO CHARGE NURSE ONLY: CPT

## 2024-02-26 NOTE — PROGRESS NOTES
Sarahy Meng is a 48 year old patient who comes in today for a Blood Pressure check.  Initial BP:  BP (!) 154/84 (BP Location: Right arm, Patient Position: Sitting, Cuff Size: Adult Regular)   Pulse 62   LMP 02/01/2024   SpO2 96%      62  Disposition: follow-up as previously indicated by provider  Fior Hoang, [facility]

## 2024-03-14 ENCOUNTER — ALLIED HEALTH/NURSE VISIT (OUTPATIENT)
Dept: FAMILY MEDICINE | Facility: CLINIC | Age: 49
End: 2024-03-14
Payer: COMMERCIAL

## 2024-03-14 VITALS — DIASTOLIC BLOOD PRESSURE: 81 MMHG | SYSTOLIC BLOOD PRESSURE: 132 MMHG

## 2024-03-14 DIAGNOSIS — I10 ESSENTIAL HYPERTENSION: Primary | ICD-10-CM

## 2024-03-14 PROCEDURE — 99207 PR NO CHARGE NURSE ONLY: CPT

## 2024-03-14 NOTE — PROGRESS NOTES
Sarahy Meng is a 48 year old patient who comes in today for a Blood Pressure check.  Initial BP:  /81 (BP Location: Right arm, Patient Position: Sitting, Cuff Size: Adult Large)   LMP 02/01/2024      Data Unavailable  Disposition: follow-up as previously indicated by provider    Catrachita Ahumada MA on 3/14/2024 at 8:06 AM

## 2024-03-27 ENCOUNTER — OFFICE VISIT (OUTPATIENT)
Dept: FAMILY MEDICINE | Facility: CLINIC | Age: 49
End: 2024-03-27
Payer: COMMERCIAL

## 2024-03-27 VITALS
OXYGEN SATURATION: 99 % | BODY MASS INDEX: 31.02 KG/M2 | SYSTOLIC BLOOD PRESSURE: 134 MMHG | WEIGHT: 158 LBS | HEART RATE: 81 BPM | RESPIRATION RATE: 14 BRPM | DIASTOLIC BLOOD PRESSURE: 87 MMHG | HEIGHT: 60 IN

## 2024-03-27 DIAGNOSIS — I10 ESSENTIAL HYPERTENSION: Primary | ICD-10-CM

## 2024-03-27 DIAGNOSIS — F41.9 ANXIETY: ICD-10-CM

## 2024-03-27 PROCEDURE — 99214 OFFICE O/P EST MOD 30 MIN: CPT | Performed by: PHYSICIAN ASSISTANT

## 2024-03-27 RX ORDER — LISINOPRIL 20 MG/1
20 TABLET ORAL DAILY
Qty: 30 TABLET | Refills: 1 | Status: SHIPPED | OUTPATIENT
Start: 2024-03-27 | End: 2024-04-26

## 2024-03-27 RX ORDER — FLUOXETINE 10 MG/1
10 CAPSULE ORAL DAILY
Qty: 30 CAPSULE | Refills: 1 | Status: SHIPPED | OUTPATIENT
Start: 2024-03-27 | End: 2024-04-26

## 2024-03-27 ASSESSMENT — ANXIETY QUESTIONNAIRES
IF YOU CHECKED OFF ANY PROBLEMS ON THIS QUESTIONNAIRE, HOW DIFFICULT HAVE THESE PROBLEMS MADE IT FOR YOU TO DO YOUR WORK, TAKE CARE OF THINGS AT HOME, OR GET ALONG WITH OTHER PEOPLE: VERY DIFFICULT
4. TROUBLE RELAXING: SEVERAL DAYS
GAD7 TOTAL SCORE: 8
7. FEELING AFRAID AS IF SOMETHING AWFUL MIGHT HAPPEN: NOT AT ALL
6. BECOMING EASILY ANNOYED OR IRRITABLE: SEVERAL DAYS
GAD7 TOTAL SCORE: 8
2. NOT BEING ABLE TO STOP OR CONTROL WORRYING: SEVERAL DAYS
5. BEING SO RESTLESS THAT IT IS HARD TO SIT STILL: SEVERAL DAYS
8. IF YOU CHECKED OFF ANY PROBLEMS, HOW DIFFICULT HAVE THESE MADE IT FOR YOU TO DO YOUR WORK, TAKE CARE OF THINGS AT HOME, OR GET ALONG WITH OTHER PEOPLE?: VERY DIFFICULT
1. FEELING NERVOUS, ANXIOUS, OR ON EDGE: MORE THAN HALF THE DAYS
GAD7 TOTAL SCORE: 8
3. WORRYING TOO MUCH ABOUT DIFFERENT THINGS: MORE THAN HALF THE DAYS
7. FEELING AFRAID AS IF SOMETHING AWFUL MIGHT HAPPEN: NOT AT ALL

## 2024-03-27 ASSESSMENT — PATIENT HEALTH QUESTIONNAIRE - PHQ9
SUM OF ALL RESPONSES TO PHQ QUESTIONS 1-9: 12
SUM OF ALL RESPONSES TO PHQ QUESTIONS 1-9: 12
10. IF YOU CHECKED OFF ANY PROBLEMS, HOW DIFFICULT HAVE THESE PROBLEMS MADE IT FOR YOU TO DO YOUR WORK, TAKE CARE OF THINGS AT HOME, OR GET ALONG WITH OTHER PEOPLE: VERY DIFFICULT

## 2024-03-27 NOTE — PATIENT INSTRUCTIONS
(I10) Essential hypertension  (primary encounter diagnosis)  Comment: will increase lsiniopril to 20 mg .   Will have come in for nurse only BP check x 2 and then follow-up for office visit in 1 month with BP diary.      Plan: lisinopril (ZESTRIL) 20 MG tablet            (F41.9) Anxiety  Comment: advised to go back on prozac.  Risks, benefits, side effects and intended purposes discussed.   Follow-up in 1 month   Plan: FLUoxetine (PROZAC) 10 MG capsule

## 2024-03-27 NOTE — PROGRESS NOTES
Assessment & Plan     Essential hypertension  Increased lisinopril to 20 mg  Will have come in for nurse only BP check x 2 and then follow-up for office visit in 1 month with BP diary.      - lisinopril (ZESTRIL) 20 MG tablet; Take 1 tablet (20 mg) by mouth daily    Anxiety  Will try prozac as this is more weight neutral than the other ssri's.  Effexor may cause blood pressure to elevate.  Advised follow-up in 1 month   - FLUoxetine (PROZAC) 10 MG capsule; Take 1 capsule (10 mg) by mouth daily          Depression Screening Follow Up        3/27/2024     5:04 PM   PHQ   PHQ-9 Total Score 12   Q9: Thoughts of better off dead/self-harm past 2 weeks Not at all           Follow Up Actions Taken  Crisis resource information provided in After Visit Summary           Kvng Weathers is a 48 year old, presenting for the following health issues:  Hypertension        3/27/2024     5:04 PM   Additional Questions   Roomed by TOMMY Mazariegos   Accompanied by Self     Hypertension: has been taking lisinopril 10 mg and no issue with it but bp is still running a little high.     Anxiety: has noticed an increase in anxiety.  Has been on most of the selective serotonin reuptake inhibitor's in the past and effexor was the last medication she took.  Does not remember how she reacted to any of these meds.   HPI         Review of Systems  CONSTITUTIONAL: NEGATIVE for fever, chills, change in weight  INTEGUMENTARY/SKIN: NEGATIVE for worrisome rashes, moles or lesions  EYES: NEGATIVE for vision changes or irritation  ENT/MOUTH: NEGATIVE for ear, mouth and throat problems  RESP: NEGATIVE for significant cough or SOB  BREAST: NEGATIVE for masses, tenderness or discharge  CV: NEGATIVE for chest pain, palpitations or peripheral edema  GI: NEGATIVE for nausea, abdominal pain, heartburn, or change in bowel habits  : NEGATIVE for frequency, dysuria, or hematuria  MUSCULOSKELETAL: NEGATIVE for significant arthralgias or myalgia  NEURO:  "NEGATIVE for weakness, dizziness or paresthesias  ENDOCRINE: NEGATIVE for temperature intolerance, skin/hair changes  HEME: NEGATIVE for bleeding problems  PSYCHIATRIC: NEGATIVE for changes in mood or affect      Objective    /87 (BP Location: Left arm, Patient Position: Sitting, Cuff Size: Adult Large)   Pulse 81   Resp 14   Ht 1.518 m (4' 11.75\")   Wt 71.7 kg (158 lb)   LMP 02/01/2024 (Approximate)   SpO2 99%   Breastfeeding No   BMI 31.12 kg/m    Body mass index is 31.12 kg/m .  Physical Exam   GENERAL: alert and no distress  HENT: ear canals and TM's normal, nose and mouth without ulcers or lesions  RESP: lungs clear to auscultation - no rales, rhonchi or wheezes  CV: regular rate and rhythm, normal S1 S2, no S3 or S4, no murmur, click or rub, no peripheral edema   MS: no gross musculoskeletal defects noted, no edema  PSYCH: mentation appears normal, affect normal/bright            Signed Electronically by: Ramona Ann Aaseby-Aguilera, PA-C    Answers submitted by the patient for this visit:  Patient Health Questionnaire (Submitted on 3/27/2024)  If you checked off any problems, how difficult have these problems made it for you to do your work, take care of things at home, or get along with other people?: Very difficult  PHQ9 TOTAL SCORE: 12    "

## 2024-04-03 ENCOUNTER — ALLIED HEALTH/NURSE VISIT (OUTPATIENT)
Dept: FAMILY MEDICINE | Facility: CLINIC | Age: 49
End: 2024-04-03
Payer: COMMERCIAL

## 2024-04-03 ENCOUNTER — TELEPHONE (OUTPATIENT)
Dept: FAMILY MEDICINE | Facility: CLINIC | Age: 49
End: 2024-04-03

## 2024-04-03 VITALS — HEART RATE: 83 BPM | SYSTOLIC BLOOD PRESSURE: 132 MMHG | DIASTOLIC BLOOD PRESSURE: 82 MMHG

## 2024-04-03 DIAGNOSIS — I10 ESSENTIAL HYPERTENSION: Primary | ICD-10-CM

## 2024-04-03 PROCEDURE — 99207 PR NO CHARGE NURSE ONLY: CPT

## 2024-04-03 NOTE — PROGRESS NOTES
Sarahy Meng is a 48 year old patient who comes in today for a Blood Pressure check.  Initial BP:  /82 (BP Location: Right arm, Patient Position: Chair, Cuff Size: Adult Regular)   Pulse 83   LMP 02/01/2024 (Approximate)      83  Disposition: results routed to provider    Sinan Aldana CMA

## 2024-04-26 ENCOUNTER — OFFICE VISIT (OUTPATIENT)
Dept: FAMILY MEDICINE | Facility: CLINIC | Age: 49
End: 2024-04-26
Payer: COMMERCIAL

## 2024-04-26 VITALS
RESPIRATION RATE: 16 BRPM | OXYGEN SATURATION: 100 % | BODY MASS INDEX: 30.43 KG/M2 | HEART RATE: 60 BPM | HEIGHT: 60 IN | WEIGHT: 155 LBS | SYSTOLIC BLOOD PRESSURE: 129 MMHG | DIASTOLIC BLOOD PRESSURE: 81 MMHG | TEMPERATURE: 99 F

## 2024-04-26 DIAGNOSIS — I10 ESSENTIAL HYPERTENSION: ICD-10-CM

## 2024-04-26 DIAGNOSIS — Z23 VACCINE FOR VIRAL HEPATITIS: Primary | ICD-10-CM

## 2024-04-26 DIAGNOSIS — F41.9 ANXIETY: ICD-10-CM

## 2024-04-26 PROCEDURE — 96127 BRIEF EMOTIONAL/BEHAV ASSMT: CPT | Performed by: PHYSICIAN ASSISTANT

## 2024-04-26 PROCEDURE — 90632 HEPA VACCINE ADULT IM: CPT | Performed by: PHYSICIAN ASSISTANT

## 2024-04-26 PROCEDURE — 90471 IMMUNIZATION ADMIN: CPT | Performed by: PHYSICIAN ASSISTANT

## 2024-04-26 PROCEDURE — 99213 OFFICE O/P EST LOW 20 MIN: CPT | Mod: 25 | Performed by: PHYSICIAN ASSISTANT

## 2024-04-26 RX ORDER — FLUOXETINE 10 MG/1
10 CAPSULE ORAL DAILY
Qty: 90 CAPSULE | Refills: 3 | Status: SHIPPED | OUTPATIENT
Start: 2024-04-26

## 2024-04-26 RX ORDER — LISINOPRIL 20 MG/1
20 TABLET ORAL DAILY
Qty: 90 TABLET | Refills: 1 | Status: SHIPPED | OUTPATIENT
Start: 2024-04-26

## 2024-04-26 ASSESSMENT — ANXIETY QUESTIONNAIRES
3. WORRYING TOO MUCH ABOUT DIFFERENT THINGS: SEVERAL DAYS
6. BECOMING EASILY ANNOYED OR IRRITABLE: NOT AT ALL
7. FEELING AFRAID AS IF SOMETHING AWFUL MIGHT HAPPEN: NOT AT ALL
2. NOT BEING ABLE TO STOP OR CONTROL WORRYING: SEVERAL DAYS
GAD7 TOTAL SCORE: 4
8. IF YOU CHECKED OFF ANY PROBLEMS, HOW DIFFICULT HAVE THESE MADE IT FOR YOU TO DO YOUR WORK, TAKE CARE OF THINGS AT HOME, OR GET ALONG WITH OTHER PEOPLE?: NOT DIFFICULT AT ALL
4. TROUBLE RELAXING: SEVERAL DAYS
5. BEING SO RESTLESS THAT IT IS HARD TO SIT STILL: NOT AT ALL
1. FEELING NERVOUS, ANXIOUS, OR ON EDGE: SEVERAL DAYS
7. FEELING AFRAID AS IF SOMETHING AWFUL MIGHT HAPPEN: NOT AT ALL
IF YOU CHECKED OFF ANY PROBLEMS ON THIS QUESTIONNAIRE, HOW DIFFICULT HAVE THESE PROBLEMS MADE IT FOR YOU TO DO YOUR WORK, TAKE CARE OF THINGS AT HOME, OR GET ALONG WITH OTHER PEOPLE: NOT DIFFICULT AT ALL

## 2024-04-26 ASSESSMENT — PATIENT HEALTH QUESTIONNAIRE - PHQ9
SUM OF ALL RESPONSES TO PHQ QUESTIONS 1-9: 6
10. IF YOU CHECKED OFF ANY PROBLEMS, HOW DIFFICULT HAVE THESE PROBLEMS MADE IT FOR YOU TO DO YOUR WORK, TAKE CARE OF THINGS AT HOME, OR GET ALONG WITH OTHER PEOPLE: SOMEWHAT DIFFICULT
SUM OF ALL RESPONSES TO PHQ QUESTIONS 1-9: 6

## 2024-04-26 NOTE — PROGRESS NOTES
Assessment & Plan     Vaccine for viral hepatitis    - HEPATITIS A 19+ (HAVRIX/VAQTA)    Anxiety  Stable   - FLUoxetine (PROZAC) 10 MG capsule; Take 1 capsule (10 mg) by mouth daily    Essential hypertension  Stable  follow-up in 6 months   - lisinopril (ZESTRIL) 20 MG tablet; Take 1 tablet (20 mg) by mouth daily                Kvng Weathers is a 48 year old, presenting for the following health issues:  Hypertension (Follow-up meds, BP check)        4/26/2024    12:45 PM   Additional Questions   Roomed by Aleah Grier       HTN: started on lisinopril 20 mg and states works well with no side effects.    Anxiety: on prozac 10 mg and doing well.       History of Present Illness       Hypertension: She presents for follow up of hypertension.  She does check blood pressure  regularly outside of the clinic. Outside blood pressures have been over 140/90. She follows a low salt diet.     She eats 2-3 servings of fruits and vegetables daily.She consumes 1 sweetened beverage(s) daily.She exercises with enough effort to increase her heart rate 20 to 29 minutes per day.  She exercises with enough effort to increase her heart rate 4 days per week.   She is taking medications regularly.             Review of Systems  CONSTITUTIONAL: NEGATIVE for fever, chills, change in weight  INTEGUMENTARY/SKIN: NEGATIVE for worrisome rashes, moles or lesions  EYES: NEGATIVE for vision changes or irritation  ENT/MOUTH: NEGATIVE for ear, mouth and throat problems  RESP: NEGATIVE for significant cough or SOB  BREAST: NEGATIVE for masses, tenderness or discharge  CV: NEGATIVE for chest pain, palpitations or peripheral edema  GI: NEGATIVE for nausea, abdominal pain, heartburn, or change in bowel habits  : NEGATIVE for frequency, dysuria, or hematuria  MUSCULOSKELETAL: NEGATIVE for significant arthralgias or myalgia  NEURO: NEGATIVE for weakness, dizziness or paresthesias  ENDOCRINE: NEGATIVE for temperature intolerance, skin/hair  "changes  HEME: NEGATIVE for bleeding problems  PSYCHIATRIC: NEGATIVE for changes in mood or affect      Objective    /81 (BP Location: Right arm, Patient Position: Chair, Cuff Size: Adult Large)   Pulse 60   Temp 99  F (37.2  C) (Oral)   Resp 16   Ht 1.511 m (4' 11.5\")   Wt 70.3 kg (155 lb)   LMP 03/01/2024 (Approximate)   SpO2 100%   BMI 30.78 kg/m    Body mass index is 30.78 kg/m .  Physical Exam   GENERAL: alert and no distress  HENT: ear canals and TM's normal, nose and mouth without ulcers or lesions  RESP: lungs clear to auscultation - no rales, rhonchi or wheezes  CV: regular rate and rhythm, normal S1 S2, no S3 or S4, no murmur, click or rub, no peripheral edema   PSYCH: mentation appears normal, affect normal/bright            Signed Electronically by: Ramona Ann Aaseby-Aguilera, PA-C    "

## 2024-08-16 NOTE — PROGRESS NOTES
ASSESSMENT & PLAN  Patient Instructions     1. CMC DJD(carpometacarpal degenerative joint disease), localized primary, left    2. Chronic pain of left thumb        -Patient presents with pain about the left thumb CMC joint.  Without an injury.  On exam she is quite tender about this joint.  Remainder of her hand exam is within normal.  Her x-rays do demonstrate early onset of degenerative changes consistent with arthritis of the CMC joint.  -We discussed management for this including taking Aleve, turmeric, topical creams such as Voltaren gel, Aspercreme, Biofreeze, IcyHot or Salonpas.  We also talked about utilizing a Comfort Cool thumb brace and she would like to try this.  -We talked about occupational hand therapy but also talked about a book on [employer] called caring for the painful thumb written by Beth Andrews.  -We discussed failure of improvement with these conservative treatments the neck step would be to consider corticosteroid injection.  We also briefly talked about surgical intervention but that would be last in the treatment management of her thumb CMC arthritis.  -She will follow-up in sports medicine as needed.    -Call direct clinic number [[phone]] at any time with questions or concerns.    -Orthopedic Walk in Monday through Thursday 8 am to 6 pm, Friday 8 am to 5 pm, Saturday 8 am to 12 pm at [address] [facility] [address] [place].        Xochilt Riddle PA-C  [facility]    -----  Chief Complaint   Patient presents with    Left Hand - Pain       SUBJECTIVE  Sarahy Meng is a/an 48 year old right-handed female who is seen as a self referral for evaluation of left thumb pain.  Onset was a couple months ago.  She does not recall any injury, pointing to pain near CMC joint.  Pain is located left thumb. Symptoms are worsened by lifting pans, use, typing.  She has tried none. Associated symptoms include sharp shooting pain none.    The patient is seen  "alone    Prior injury/Surgical history of affected joint: none  Social History/Occupation:     REVIEW OF SYSTEMS:  Pertinent positives/negative: As stated above in HPI    OBJECTIVE:  /80   Pulse 64   Ht 1.511 m (4' 11.49\")   Wt 70.3 kg (155 lb)   BMI 30.79 kg/m     General: Alert and in no distress  Skin: no visable rashes  CV: Extremities appear well perfused   Resp: normal respiratory effort, no conversational dyspnea   Psych: normal mood, affect  MSK: LEFT HAND    Inspection:  There is no evidence of open wounds.   There is no evidence of erythema or infection  There is no appreciable swelling or synovitis.      Palpation:  There is no palpable fluctuance  There is tenderness to palpation at CMC of the thumb.      Motor:  Intact in the median, radial, and ulnar nerve distributions    Sensory:  Intact to light touch in the median, radial, and ulnar nerve distributions    Radial pulse is palpable and equal to contralateral side    She will make a full composite fist and extend her digits without deficit.        RADIOLOGY:  Final results and radiologist's interpretation available in the [facility] health record.  Images below were personally reviewed and discussed with the patient in the office today.  My personal interpretation of the performed imaging: Left wrist x-rays demonstrate degenerative changes most notable of the left thumb CMC joint.  No acute fracture seen.       "

## 2024-08-17 ENCOUNTER — ANCILLARY PROCEDURE (OUTPATIENT)
Dept: GENERAL RADIOLOGY | Facility: CLINIC | Age: 49
End: 2024-08-17
Attending: PHYSICIAN ASSISTANT
Payer: COMMERCIAL

## 2024-08-17 ENCOUNTER — OFFICE VISIT (OUTPATIENT)
Dept: ORTHOPEDICS | Facility: CLINIC | Age: 49
End: 2024-08-17
Payer: COMMERCIAL

## 2024-08-17 VITALS
HEIGHT: 59 IN | SYSTOLIC BLOOD PRESSURE: 122 MMHG | DIASTOLIC BLOOD PRESSURE: 80 MMHG | WEIGHT: 155 LBS | BODY MASS INDEX: 31.25 KG/M2 | HEART RATE: 64 BPM

## 2024-08-17 DIAGNOSIS — G89.29 CHRONIC PAIN OF LEFT THUMB: ICD-10-CM

## 2024-08-17 DIAGNOSIS — M79.645 CHRONIC PAIN OF LEFT THUMB: ICD-10-CM

## 2024-08-17 DIAGNOSIS — M19.032 CMC DJD(CARPOMETACARPAL DEGENERATIVE JOINT DISEASE), LOCALIZED PRIMARY, LEFT: Primary | ICD-10-CM

## 2024-08-17 PROCEDURE — 73110 X-RAY EXAM OF WRIST: CPT | Mod: TC | Performed by: RADIOLOGY

## 2024-08-17 PROCEDURE — 99203 OFFICE O/P NEW LOW 30 MIN: CPT | Performed by: PHYSICIAN ASSISTANT

## 2024-08-17 NOTE — PATIENT INSTRUCTIONS
1. CMC DJD(carpometacarpal degenerative joint disease), localized primary, left    2. Chronic pain of left thumb        -Patient presents with pain about the left thumb CMC joint.  Without an injury.  On exam she is quite tender about this joint.  Remainder of her hand exam is within normal.  Her x-rays do demonstrate early onset of degenerative changes consistent with arthritis of the CMC joint.  -We discussed management for this including taking Aleve, turmeric, topical creams such as Voltaren gel, Aspercreme, Biofreeze, IcyHot or Salonpas.  We also talked about utilizing a Comfort Cool thumb brace and she would like to try this.  -We talked about occupational hand therapy but also talked about a book on [employer] called caring for the painful thumb written by Beth Andrews.  -We discussed failure of improvement with these conservative treatments the neck step would be to consider corticosteroid injection.  We also briefly talked about surgical intervention but that would be last in the treatment management of her thumb CMC arthritis.  -She will follow-up in sports medicine as needed.    -Call direct clinic number [[phone]] at any time with questions or concerns.    -Orthopedic Walk in Monday through Thursday 8 am to 6 pm, Friday 8 am to 5 pm, Saturday 8 am to 12 pm at [address] [address].

## 2024-10-28 DIAGNOSIS — I10 ESSENTIAL HYPERTENSION: ICD-10-CM

## 2024-10-29 RX ORDER — LISINOPRIL 20 MG/1
20 TABLET ORAL DAILY
Qty: 90 TABLET | Refills: 0 | Status: SHIPPED | OUTPATIENT
Start: 2024-10-29

## 2025-01-02 ENCOUNTER — TRANSFERRED RECORDS (OUTPATIENT)
Dept: HEALTH INFORMATION MANAGEMENT | Facility: CLINIC | Age: 50
End: 2025-01-02
Payer: COMMERCIAL

## 2025-01-15 ENCOUNTER — PATIENT OUTREACH (OUTPATIENT)
Dept: CARE COORDINATION | Facility: CLINIC | Age: 50
End: 2025-01-15
Payer: COMMERCIAL

## 2025-01-29 ENCOUNTER — PATIENT OUTREACH (OUTPATIENT)
Dept: CARE COORDINATION | Facility: CLINIC | Age: 50
End: 2025-01-29
Payer: COMMERCIAL

## 2025-02-11 ENCOUNTER — TRANSFERRED RECORDS (OUTPATIENT)
Dept: HEALTH INFORMATION MANAGEMENT | Facility: CLINIC | Age: 50
End: 2025-02-11
Payer: COMMERCIAL

## 2025-02-13 ENCOUNTER — VIRTUAL VISIT (OUTPATIENT)
Dept: FAMILY MEDICINE | Facility: CLINIC | Age: 50
End: 2025-02-13
Payer: COMMERCIAL

## 2025-02-13 ENCOUNTER — OFFICE VISIT (OUTPATIENT)
Dept: FAMILY MEDICINE | Facility: CLINIC | Age: 50
End: 2025-02-13
Payer: COMMERCIAL

## 2025-02-13 VITALS
WEIGHT: 157 LBS | TEMPERATURE: 98.8 F | HEIGHT: 60 IN | RESPIRATION RATE: 18 BRPM | HEART RATE: 76 BPM | DIASTOLIC BLOOD PRESSURE: 82 MMHG | BODY MASS INDEX: 30.82 KG/M2 | OXYGEN SATURATION: 97 % | SYSTOLIC BLOOD PRESSURE: 138 MMHG

## 2025-02-13 DIAGNOSIS — R68.89 FLU-LIKE SYMPTOMS: Primary | ICD-10-CM

## 2025-02-13 DIAGNOSIS — H66.002 ACUTE SUPPURATIVE OTITIS MEDIA OF LEFT EAR WITHOUT SPONTANEOUS RUPTURE OF TYMPANIC MEMBRANE, RECURRENCE NOT SPECIFIED: ICD-10-CM

## 2025-02-13 DIAGNOSIS — J10.1 INFLUENZA A: Primary | ICD-10-CM

## 2025-02-13 DIAGNOSIS — H92.02 LEFT EAR PAIN: ICD-10-CM

## 2025-02-13 LAB
FLUAV AG SPEC QL IA: POSITIVE
FLUBV AG SPEC QL IA: NEGATIVE

## 2025-02-13 RX ORDER — OSELTAMIVIR PHOSPHATE 75 MG/1
75 CAPSULE ORAL 2 TIMES DAILY
Qty: 10 CAPSULE | Refills: 0 | Status: SHIPPED | OUTPATIENT
Start: 2025-02-13 | End: 2025-02-18

## 2025-02-13 RX ORDER — AMOXICILLIN 500 MG/1
500 CAPSULE ORAL 2 TIMES DAILY
Qty: 14 CAPSULE | Refills: 0 | Status: SHIPPED | OUTPATIENT
Start: 2025-02-13 | End: 2025-02-20

## 2025-02-13 NOTE — PROGRESS NOTES
Assessment & Plan     Influenza A    Treat with Tamiflu given that she is high risk given history of smoking. Having systemic symptoms of fever, cough, ear pain, and body aches.     - Influenza A & B Antigen - Clinic Collect  - oseltamivir (TAMIFLU) 75 MG capsule; Take 1 capsule (75 mg) by mouth 2 times daily for 5 days.      Acute suppurative otitis media of left ear without spontaneous rupture of tympanic membrane, recurrence not specified    Treat with antibiotics given that left ear is very red and bulging TM.    - amoxicillin (AMOXIL) 500 MG capsule; Take 1 capsule (500 mg) by mouth 2 times daily for 7 days.      The longitudinal plan of care for the diagnosis(es)/condition(s) as documented were addressed during this visit. Due to the added complexity in care, I will continue to support Sarahy in the subsequent management and with ongoing continuity of care.            Kvng Weathers is a 49 year old, presenting for the following health issues:  URI        2/13/2025     9:49 AM   Additional Questions   Roomed by Kiera JACOBSEN    History of Present Illness       Reason for visit:  Flu, sinus pressure symptoms  Symptom onset:  3-7 days ago  Symptom intensity:  Severe  Symptom progression:  Worsening  Had these symptoms before:  Yes  Has tried/received treatment for these symptoms:  Yes  Previous treatment was successful:  Yes  Prior treatment description:  RX  What makes it worse:  Na  What makes it better:  No   She is taking medications regularly.         Acute Illness  Acute illness concerns: cough, chills, fever  Onset/Duration: 3 days ago   Symptoms:  Fever: YES  Chills/Sweats: YES  Headache (location?): YES  Sinus Pressure: YES  Conjunctivitis:  No  Ear Pain: YES: both- ears plugged and painful, pops at times, possible drainage from left ear last night  Rhinorrhea: YES  Congestion: YES  Sore Throat: YES  Cough: YES-productive of clear sputum  Wheeze: No  Decreased Appetite: YES  Nausea:  "No  Vomiting: No  Diarrhea: No  Dysuria/Freq.: No  Dysuria or Hematuria: No  Fatigue/Achiness: YES  Sick/Strep Exposure: YES- granddaughter has influenza  Therapies tried and outcome: otc Tylenol, DayQuil, NyQuil      Review of Systems  Constitutional, HEENT, cardiovascular, pulmonary, gi and gu systems are negative, except as otherwise noted.        Objective    /82 (BP Location: Right arm, Patient Position: Sitting, Cuff Size: Adult Regular)   Pulse 76   Temp 98.8  F (37.1  C) (Oral)   Resp 18   Ht 1.518 m (4' 11.75\")   Wt 71.2 kg (157 lb)   LMP  (LMP Unknown)   SpO2 97%   BMI 30.92 kg/m    Body mass index is 30.92 kg/m .      Physical Exam   GENERAL: alert and no distress  EYES: Eyes grossly normal to inspection, PERRL and conjunctivae and sclerae normal  HENT: normal cephalic/atraumatic, right ear: clear effusion and bulging membrane, left ear: erythematous and bulging membrane, nose and mouth without ulcers or lesions, oropharynx clear, and oral mucous membranes moist  RESP: lungs clear to auscultation - no rales, rhonchi or wheezes  CV: regular rate and rhythm, normal S1 S2, no S3 or S4, no murmur, click or rub, no peripheral edema  MS: no gross musculoskeletal defects noted, no edema  SKIN: no suspicious lesions or rashes  NEURO: Normal strength and tone, mentation intact and speech normal  PSYCH: mentation appears normal, affect normal/bright  Anterior cervical lymph nodes are swollen.    Results for orders placed or performed in visit on 02/13/25 (from the past 24 hours)   Influenza A & B Antigen - Clinic Collect    Specimen: Nose; Swab   Result Value Ref Range    Influenza A antigen Positive (A) Negative    Influenza B antigen Negative Negative    Narrative    Test results must be correlated with clinical data. If necessary, results should be confirmed by a molecular assay or viral culture.           Signed Electronically by: Dakota Mims PA-C    "

## 2025-02-13 NOTE — PROGRESS NOTES
Sarahy is a 49 year old who is being evaluated via a billable video visit.    How would you like to obtain your AVS? MyChart  If the video visit is dropped, the invitation should be resent by: Text to cell phone: [phone]  Will anyone else be joining your video visit? No      Assessment & Plan     Flu-like symptoms  Given known exposures and symptoms, likely diagnosis of influenza.  Unable to assess fully for secondary complications such as respiratory compromise and/or pneumonia.  However appears in no significant respiratory distress on exam.  The 1 concern is his ongoing severe left ear pain.  Cannot rule out secondary otitis media without a formal exam.  Based off of this, we will cancel this virtual visit and patient to schedule an in person visit near her home for further evaluation as well as influenza testing.  Given tobacco use, she is a high risk patient and though course of influenza is greater than 48 hours, it is currently recommended for high risk patients with influenza to be given Tamiflu regardless of course length.    Left ear pain  As above        Subjective   Sarahy is a 49 year old, presenting for the following health issues:  Nasal Congestion, Sinus Problem, and Cough (Granddaughter recently had influenza A)        2/13/2025     8:42 AM   Additional Questions   Roomed by Casie RIVERA CMA   Accompanied by Self     HPI     Patient is a 49-year-old female with a past history of hypertension, anxiety, and tobacco use.  She presents today virtually with a 4-day history of progressively worsening cough nasal congestion shortness of breath and severe left ear pain.  She admits to ongoing chills.  No measurable fever taken.  Denies any significant chest pains or shortness of breath but also admits to heaviness with breathing in her chest.  She states she was taking care of her granddaughter over the weekend who was diagnosed with influenza.  Patient has not had her influenza vaccine.  No  significant treatments have been tried.          Objective           Vitals:  No vitals were obtained today due to virtual visit.    Physical Exam   GENERAL: alert, no distress, and fatigued  EYES: Eyes grossly normal to inspection.  No discharge or erythema, or obvious scleral/conjunctival abnormalities.  RESP: No audible wheeze, cough, or visible cyanosis.    SKIN: Visible skin clear. No significant rash, abnormal pigmentation or lesions.  NEURO: Cranial nerves grossly intact.  Mentation and speech appropriate for age.  PSYCH: Appropriate affect, tone, and pace of words        Video-Visit Details    Type of service:  Video Visit   Originating Location (pt. Location): Home    Distant Location (provider location):  On-site  Platform used for Video Visit: [employer]  Signed Electronically by: Remy Connolly PA-C

## 2025-02-16 ENCOUNTER — NURSE TRIAGE (OUTPATIENT)
Dept: NURSING | Facility: CLINIC | Age: 50
End: 2025-02-16

## 2025-02-16 ENCOUNTER — VIRTUAL VISIT (OUTPATIENT)
Dept: URGENT CARE | Facility: CLINIC | Age: 50
End: 2025-02-16
Payer: COMMERCIAL

## 2025-02-16 DIAGNOSIS — H66.92 ACUTE OTITIS MEDIA, LEFT: ICD-10-CM

## 2025-02-16 DIAGNOSIS — Z88.1 DRUG ALLERGY, ANTIBIOTIC: Primary | ICD-10-CM

## 2025-02-16 DIAGNOSIS — R21 RASH: ICD-10-CM

## 2025-02-16 PROCEDURE — 98005 SYNCH AUDIO-VIDEO EST LOW 20: CPT

## 2025-02-16 RX ORDER — DOXYCYCLINE HYCLATE 100 MG
100 TABLET ORAL 2 TIMES DAILY
Qty: 14 TABLET | Refills: 0 | Status: SHIPPED | OUTPATIENT
Start: 2025-02-16 | End: 2025-02-23

## 2025-02-16 NOTE — PROGRESS NOTES
Sarahy is a 49 year old female who presents for a billable video visit.    ASSESSMENT/PLAN:    ICD-10-CM    1. Drug allergy, antibiotic  Z88.1       2. Rash  R21       3. Acute otitis media, left  H66.92 doxycycline hyclate (VIBRA-TABS) 100 MG tablet          Possible drug allergy to penicillin and therefore we will have her stop taking medication and replace with doxycycline twice daily for 7 days for ear infection.  I will have her follow-up with her primary care provider for further testing and evaluation for possible medication allergy.  Over-the-counter supportive measures including oral antihistamine and cortisone cream to aid with rash.    Follow up with primary care provider with any problems, questions or concerns or if symptoms worsen or fail to improve. Patient verbalized understanding and is agreeable to plan.     SUBJECTIVE:  Sarahy Meng is a 49 year old who presents for a rash.  Onset of rash was 4 day(s) ago.  She was seen 4 days ago for influenza A illness and noted left ear infection with prescription of amoxicillin.  She reports that night she started taking amoxicillin and rash appeared soon after.  Patient has taken medication many years ago and does not believe she had an allergic reaction.  Reports also taking Tamiflu but rash started 12 hours before ingestion of medication.  She does have a child with known penicillin allergy.  Location of the rash: back, torso, and sides  Quality/symptoms of rash: itching and irritated   Associated symptoms include: nothing.  Symptoms are moderate and rash seems to be not changing over the course of time.  Previous history of a similar rash? No  Treatment measures tried include:  hydration with lotion or cream  Recent exposure history: none known    Patient denies fever/chills, difficulty breathing, throat/tongue swelling, new meds, pets, foods, soaps, detergents, lotions, or enviornmental contacts.    Review of Systems  All systems reviewed and negative  except per HPI.      OBJECTIVE:  Vitals not done due to this being a virtual visit    GENERAL: alert and no distress  EYES: Eyes grossly normal to inspection.  No discharge or erythema, or obvious scleral/conjunctival abnormalities.  RESP: No audible wheeze, cough, or visible cyanosis.    SKIN: Multiple tiny erythematous papules located on her back, chest, and buttocks.  No obvious rash located on her face nor swelling noted.  PSYCH: Appropriate affect, tone, and pace of words    Video-Visit Details    Type of service:  Video Visit  Video Start Time: 3:27 PM  Video End Time: 3:48 PM    Originating Location (pt. Location): Home    Distant Location (provider location):  [facility]     Platform used for Video Visit: [email or web address]

## 2025-02-16 NOTE — TELEPHONE ENCOUNTER
Nurse Triage SBAR    Is this a 2nd Level Triage? YES, LICENSED PRACTITIONER REVIEW IS REQUIRED    Situation: Pt is phoning stating that she was seen on Thursday 02/13/2025 and was dx with Influenza A - put on  Amoxicillin for ear infection and Tamiflu - pt has a rash on her lower back and torso     Background: Pt is phoning stating that she was seen on Thursday 02/13/2025 and was dx with Influenza A - put on  Amoxicillin for ear infection and Tamiflu - pt has a rash on her lower back and torso     Assessment: Pt is phoning stating that she was seen on Thursday 02/13/2025 and was dx with Influenza A - put on  Amoxicillin for ear infection and Tamiflu - pt has a rash on her lower back and torso     Pt states that she had the rash develop when she started the Amoxicillin  - had Rash prior to starting Tamiflu     States that rash started within 4 hours of starting Amoxicillin which has has been taking since 02/13/2025    Rash is not painful  - is slightly itchy     Pt has hives on the sides of her abdomen     Denies hives on face     Denies facial swelling     Denies breathing difficulty     Temperature 97.8    Protocol Recommended Disposition: See HCP Within 4 Hours (Or PCP Triage)   No disposition on file.    Recommendation: See HCP Within 4 Hours (Or PCP Triage)  - pt has a [facility] appointment today      Care advice given per protocol and when to call back. Pt verbalized understanding and agrees to plan of care.    Gena Gandhi RN  [facility] Nurse Advisor  11:00 AM 2/16/2025      Reason for Disposition   [1] Taking new prescription medication AND [2] rash within 4 hours of 1st dose    Additional Information   Negative: [1] Life-threatening reaction (anaphylaxis) in the past to the same drug AND [2] < 2 hours since exposure   Negative: Difficulty breathing or wheezing   Negative: [1] Hoarseness or cough AND [2] started soon after 1st dose of drug   Negative: [1] Swollen tongue AND [2] started soon after 1st dose of  drug   Negative: [1] Purple or blood-colored rash (spots or dots) AND [2] fever   Negative: Sounds like a life-threatening emergency to the triager   Negative: Rash is only on 1 part of the body (localized)   Negative: Taking new non-prescription (OTC) antihistamine, decongestant, ear drops, eye drops, or other OTC cough/cold medicine   Negative: Taking new prescription antihistamine, allergy medicine, asthma medicine, eye drops, ear drops or nose drops   Negative: Rash started more than 3 days after stopping new prescription medicine   Negative: Swollen tongue   Negative: [1] Widespread hives AND [2] onset < 2 hours of exposure to 1st dose of drug   Negative: Fever   Negative: Patient sounds very sick or weak to the triager   Negative: [1] Purple or blood-colored rash (spots or dots) AND [2] no fever AND [3] sounds well to triager    Protocols used: Rash - Widespread On Drugs-A-AH

## 2025-02-17 DIAGNOSIS — I10 ESSENTIAL HYPERTENSION: ICD-10-CM

## 2025-02-18 RX ORDER — LISINOPRIL 20 MG/1
20 TABLET ORAL DAILY
Qty: 30 TABLET | Refills: 0 | Status: SHIPPED | OUTPATIENT
Start: 2025-02-18

## 2025-03-28 DIAGNOSIS — I10 ESSENTIAL HYPERTENSION: ICD-10-CM

## 2025-03-31 ENCOUNTER — MYC REFILL (OUTPATIENT)
Dept: FAMILY MEDICINE | Facility: CLINIC | Age: 50
End: 2025-03-31
Payer: COMMERCIAL

## 2025-03-31 DIAGNOSIS — I10 ESSENTIAL HYPERTENSION: ICD-10-CM

## 2025-03-31 RX ORDER — LISINOPRIL 20 MG/1
20 TABLET ORAL DAILY
Qty: 30 TABLET | Refills: 0 | Status: SHIPPED | OUTPATIENT
Start: 2025-03-31

## 2025-04-01 RX ORDER — LISINOPRIL 20 MG/1
20 TABLET ORAL DAILY
Qty: 30 TABLET | Refills: 0 | OUTPATIENT
Start: 2025-04-01

## 2025-05-05 ENCOUNTER — MYC REFILL (OUTPATIENT)
Dept: FAMILY MEDICINE | Facility: CLINIC | Age: 50
End: 2025-05-05
Payer: COMMERCIAL

## 2025-05-05 DIAGNOSIS — I10 ESSENTIAL HYPERTENSION: ICD-10-CM

## 2025-05-06 RX ORDER — LISINOPRIL 20 MG/1
20 TABLET ORAL DAILY
Qty: 30 TABLET | Refills: 0 | Status: SHIPPED | OUTPATIENT
Start: 2025-05-06

## 2025-05-24 ENCOUNTER — HEALTH MAINTENANCE LETTER (OUTPATIENT)
Age: 50
End: 2025-05-24

## 2025-05-25 DIAGNOSIS — F41.9 ANXIETY: ICD-10-CM

## 2025-05-25 DIAGNOSIS — I10 ESSENTIAL HYPERTENSION: ICD-10-CM

## 2025-05-27 RX ORDER — LISINOPRIL 20 MG/1
20 TABLET ORAL DAILY
Qty: 30 TABLET | Refills: 0 | Status: SHIPPED | OUTPATIENT
Start: 2025-05-27

## 2025-05-27 RX ORDER — FLUOXETINE 10 MG/1
10 CAPSULE ORAL DAILY
Qty: 30 CAPSULE | Refills: 0 | Status: SHIPPED | OUTPATIENT
Start: 2025-05-27

## 2025-07-05 ENCOUNTER — HEALTH MAINTENANCE LETTER (OUTPATIENT)
Age: 50
End: 2025-07-05

## 2025-08-13 ENCOUNTER — OFFICE VISIT (OUTPATIENT)
Dept: FAMILY MEDICINE | Facility: CLINIC | Age: 50
End: 2025-08-13
Payer: COMMERCIAL

## 2025-08-13 VITALS
TEMPERATURE: 99.1 F | BODY MASS INDEX: 33.02 KG/M2 | WEIGHT: 168.2 LBS | HEART RATE: 66 BPM | RESPIRATION RATE: 24 BRPM | HEIGHT: 60 IN | SYSTOLIC BLOOD PRESSURE: 145 MMHG | DIASTOLIC BLOOD PRESSURE: 92 MMHG | OXYGEN SATURATION: 99 %

## 2025-08-13 DIAGNOSIS — I10 ESSENTIAL HYPERTENSION: ICD-10-CM

## 2025-08-13 DIAGNOSIS — Z72.0 TOBACCO ABUSE: ICD-10-CM

## 2025-08-13 DIAGNOSIS — Z00.00 ROUTINE GENERAL MEDICAL EXAMINATION AT A HEALTH CARE FACILITY: Primary | ICD-10-CM

## 2025-08-13 DIAGNOSIS — F41.9 ANXIETY: ICD-10-CM

## 2025-08-13 DIAGNOSIS — Z13.1 SCREENING FOR DIABETES MELLITUS (DM): ICD-10-CM

## 2025-08-13 LAB
ALBUMIN SERPL BCG-MCNC: 4.3 G/DL (ref 3.5–5.2)
ALP SERPL-CCNC: 45 U/L (ref 40–150)
ALT SERPL W P-5'-P-CCNC: 26 U/L (ref 0–50)
ANION GAP SERPL CALCULATED.3IONS-SCNC: 10 MMOL/L (ref 7–15)
AST SERPL W P-5'-P-CCNC: 24 U/L (ref 0–45)
BILIRUB SERPL-MCNC: 0.3 MG/DL
BUN SERPL-MCNC: 13.5 MG/DL (ref 6–20)
CALCIUM SERPL-MCNC: 9.8 MG/DL (ref 8.8–10.4)
CHLORIDE SERPL-SCNC: 103 MMOL/L (ref 98–107)
CHOLEST SERPL-MCNC: 232 MG/DL
CREAT SERPL-MCNC: 0.58 MG/DL (ref 0.51–0.95)
EGFRCR SERPLBLD CKD-EPI 2021: >90 ML/MIN/1.73M2
ERYTHROCYTE [DISTWIDTH] IN BLOOD BY AUTOMATED COUNT: 11.8 % (ref 10–15)
EST. AVERAGE GLUCOSE BLD GHB EST-MCNC: 105 MG/DL
FASTING STATUS PATIENT QL REPORTED: ABNORMAL
FASTING STATUS PATIENT QL REPORTED: ABNORMAL
GLUCOSE SERPL-MCNC: 101 MG/DL (ref 70–99)
HBA1C MFR BLD: 5.3 % (ref 0–5.6)
HCO3 SERPL-SCNC: 25 MMOL/L (ref 22–29)
HCT VFR BLD AUTO: 40.5 % (ref 35–47)
HDLC SERPL-MCNC: 48 MG/DL
HGB BLD-MCNC: 13.8 G/DL (ref 11.7–15.7)
LDLC SERPL CALC-MCNC: 156 MG/DL
MCH RBC QN AUTO: 34 PG (ref 26.5–33)
MCHC RBC AUTO-ENTMCNC: 34.1 G/DL (ref 31.5–36.5)
MCV RBC AUTO: 99.8 FL (ref 78–100)
NONHDLC SERPL-MCNC: 184 MG/DL
PLATELET # BLD AUTO: 311 10E3/UL (ref 150–450)
POTASSIUM SERPL-SCNC: 4.7 MMOL/L (ref 3.4–5.3)
PROT SERPL-MCNC: 7.1 G/DL (ref 6.4–8.3)
RBC # BLD AUTO: 4.06 10E6/UL (ref 3.8–5.2)
SODIUM SERPL-SCNC: 138 MMOL/L (ref 135–145)
TRIGL SERPL-MCNC: 138 MG/DL
TSH SERPL DL<=0.005 MIU/L-ACNC: 0.95 UIU/ML (ref 0.3–4.2)
WBC # BLD AUTO: 5.57 10E3/UL (ref 4–11)

## 2025-08-13 PROCEDURE — 3077F SYST BP >= 140 MM HG: CPT | Performed by: PHYSICIAN ASSISTANT

## 2025-08-13 PROCEDURE — 84443 ASSAY THYROID STIM HORMONE: CPT | Performed by: PHYSICIAN ASSISTANT

## 2025-08-13 PROCEDURE — 80053 COMPREHEN METABOLIC PANEL: CPT | Performed by: PHYSICIAN ASSISTANT

## 2025-08-13 PROCEDURE — 3079F DIAST BP 80-89 MM HG: CPT | Performed by: PHYSICIAN ASSISTANT

## 2025-08-13 PROCEDURE — 99396 PREV VISIT EST AGE 40-64: CPT | Performed by: PHYSICIAN ASSISTANT

## 2025-08-13 PROCEDURE — 36415 COLL VENOUS BLD VENIPUNCTURE: CPT | Performed by: PHYSICIAN ASSISTANT

## 2025-08-13 PROCEDURE — 80061 LIPID PANEL: CPT | Performed by: PHYSICIAN ASSISTANT

## 2025-08-13 PROCEDURE — 85027 COMPLETE CBC AUTOMATED: CPT | Performed by: PHYSICIAN ASSISTANT

## 2025-08-13 PROCEDURE — 83036 HEMOGLOBIN GLYCOSYLATED A1C: CPT | Performed by: PHYSICIAN ASSISTANT

## 2025-08-13 PROCEDURE — 1126F AMNT PAIN NOTED NONE PRSNT: CPT | Performed by: PHYSICIAN ASSISTANT

## 2025-08-13 PROCEDURE — 99213 OFFICE O/P EST LOW 20 MIN: CPT | Mod: 25 | Performed by: PHYSICIAN ASSISTANT

## 2025-08-13 RX ORDER — VARENICLINE TARTRATE 0.5 (11)-1
KIT ORAL
Qty: 53 TABLET | Refills: 0 | Status: SHIPPED | OUTPATIENT
Start: 2025-08-13

## 2025-08-13 RX ORDER — FLUOXETINE 10 MG/1
10 CAPSULE ORAL DAILY
Qty: 90 CAPSULE | Refills: 3 | Status: SHIPPED | OUTPATIENT
Start: 2025-08-13

## 2025-08-13 RX ORDER — VARENICLINE TARTRATE 1 MG/1
1 TABLET, FILM COATED ORAL 2 TIMES DAILY
Qty: 60 TABLET | Refills: 2 | Status: SHIPPED | OUTPATIENT
Start: 2025-08-13

## 2025-08-13 RX ORDER — LISINOPRIL 20 MG/1
20 TABLET ORAL DAILY
Qty: 90 TABLET | Refills: 1 | Status: SHIPPED | OUTPATIENT
Start: 2025-08-13

## 2025-08-13 RX ORDER — AMLODIPINE BESYLATE 5 MG/1
5 TABLET ORAL DAILY
Qty: 30 TABLET | Refills: 1 | Status: SHIPPED | OUTPATIENT
Start: 2025-08-13

## 2025-08-13 SDOH — HEALTH STABILITY: PHYSICAL HEALTH: ON AVERAGE, HOW MANY DAYS PER WEEK DO YOU ENGAGE IN MODERATE TO STRENUOUS EXERCISE (LIKE A BRISK WALK)?: 0 DAYS

## 2025-08-13 ASSESSMENT — SOCIAL DETERMINANTS OF HEALTH (SDOH): HOW OFTEN DO YOU GET TOGETHER WITH FRIENDS OR RELATIVES?: ONCE A WEEK

## 2025-08-13 ASSESSMENT — PAIN SCALES - GENERAL: PAINLEVEL_OUTOF10: NO PAIN (0)

## 2025-08-27 ENCOUNTER — ALLIED HEALTH/NURSE VISIT (OUTPATIENT)
Dept: FAMILY MEDICINE | Facility: CLINIC | Age: 50
End: 2025-08-27
Payer: COMMERCIAL

## 2025-08-27 VITALS — DIASTOLIC BLOOD PRESSURE: 86 MMHG | SYSTOLIC BLOOD PRESSURE: 136 MMHG | HEART RATE: 77 BPM

## 2025-08-27 DIAGNOSIS — I10 ESSENTIAL HYPERTENSION: Primary | ICD-10-CM

## 2025-08-27 PROCEDURE — 3075F SYST BP GE 130 - 139MM HG: CPT

## 2025-08-27 PROCEDURE — 3079F DIAST BP 80-89 MM HG: CPT

## 2025-08-27 PROCEDURE — 99207 PR NO CHARGE NURSE ONLY: CPT

## (undated) DEVICE — ENDO SNARE EXACTO COLD 9MM LOOP 2.4MMX230CM 00711115

## (undated) DEVICE — ENDO TRAP POLYP QUICK CATCH 710201

## (undated) DEVICE — KIT ENDO TURNOVER/PROCEDURE W/CLEAN A SCOPE LINERS 103888

## (undated) RX ORDER — FENTANYL CITRATE 0.05 MG/ML
INJECTION, SOLUTION INTRAMUSCULAR; INTRAVENOUS
Status: DISPENSED
Start: 2022-06-17